# Patient Record
Sex: MALE | Race: WHITE | NOT HISPANIC OR LATINO | Employment: OTHER | URBAN - NONMETROPOLITAN AREA
[De-identification: names, ages, dates, MRNs, and addresses within clinical notes are randomized per-mention and may not be internally consistent; named-entity substitution may affect disease eponyms.]

---

## 2017-02-02 ENCOUNTER — HOSPITAL ENCOUNTER (OUTPATIENT)
Dept: RADIOLOGY | Facility: CLINIC | Age: 62
Discharge: HOME | End: 2017-02-02
Admitting: FAMILY MEDICINE
Payer: MEDICARE

## 2017-02-02 ENCOUNTER — OFFICE VISIT (OUTPATIENT)
Dept: URGENT CARE | Facility: CLINIC | Age: 62
End: 2017-02-02
Payer: MEDICARE

## 2017-02-02 DIAGNOSIS — R05.9 COUGH: ICD-10-CM

## 2017-02-02 PROCEDURE — G0463 HOSPITAL OUTPT CLINIC VISIT: HCPCS

## 2017-02-02 PROCEDURE — 99204 OFFICE O/P NEW MOD 45 MIN: CPT

## 2017-02-02 PROCEDURE — 71020 HB CHEST X-RAY 2VW FRONTAL&LATL: CPT

## 2018-04-06 LAB
ALBUMIN SERPL BCP-MCNC: 3.9 G/DL (ref 3.5–5.7)
ALP SERPL-CCNC: 27 IU/L (ref 55–165)
ALT SERPL W P-5'-P-CCNC: 54 IU/L (ref 10–35)
AMYLASE (HISTORICAL): 33 U/L (ref 29–103)
ANION GAP SERPL CALCULATED.3IONS-SCNC: 13.1 MM/L
APTT PPP: 29.6 SEC (ref 24.4–37.6)
AST SERPL W P-5'-P-CCNC: 54 U/L (ref 8–27)
BACTERIA UR QL AUTO: ABNORMAL
BASOPHILS # BLD AUTO: 0 X3/UL (ref 0–0.3)
BASOPHILS # BLD AUTO: 0.3 % (ref 0–2)
BILIRUB SERPL-MCNC: 0.3 MG/DL (ref 0.3–1)
BILIRUB UR QL STRIP: NEGATIVE
BUN SERPL-MCNC: 30 MG/DL (ref 7–25)
CALCIUM SERPL-MCNC: 8.8 MG/DL (ref 8.6–10.5)
CHLORIDE SERPL-SCNC: 101 MM/L (ref 98–107)
CLARITY UR: CLEAR
CO2 SERPL-SCNC: 26 MM/L (ref 21–31)
COLOR UR: YELLOW
CREAT SERPL-MCNC: 2.49 MG/DL (ref 0.7–1.3)
DEPRECATED RDW RBC AUTO: 14.4 % (ref 11.5–14.5)
EGFR (HISTORICAL): 26 GFR
EGFR AFRICAN AMERICAN (HISTORICAL): 32 GFR
EOSINOPHIL # BLD AUTO: 0 X3/UL (ref 0–0.5)
EOSINOPHIL NFR BLD AUTO: 0.4 % (ref 0–5)
GLUCOSE (HISTORICAL): 170 MG/DL (ref 65–99)
GLUCOSE UR STRIP-MCNC: NEGATIVE MG/DL
HCT VFR BLD AUTO: 39.9 % (ref 42–52)
HGB BLD-MCNC: 13.1 G/DL (ref 14–18)
HGB UR QL STRIP.AUTO: NEGATIVE
INR PPP: 1.2 (ref 0.9–1.5)
KETONES UR STRIP-MCNC: NEGATIVE MG/DL
LACTATE DEHYDROGENASE FLUID (HISTORICAL): 1.3 MM/L (ref 0.5–2)
LEUKOCYTE ESTERASE UR QL STRIP: NEGATIVE
LIPASE SERPL-CCNC: 25 U/L (ref 11–82)
LYMPHOCYTES # BLD AUTO: 0.1 X3/UL (ref 1.2–4.2)
LYMPHOCYTES NFR BLD AUTO: 4.8 % (ref 20.5–51.1)
LYMPHOCYTES NFR BLD AUTO: 7 % (ref 20.5–51.1)
MCH RBC QN AUTO: 26.9 PG (ref 26–34)
MCHC RBC AUTO-ENTMCNC: 32.8 G/DL (ref 31–36)
MCV RBC AUTO: 82 FL (ref 81–99)
MONOCYTES # BLD AUTO: 0.2 X3/UL (ref 0–1)
MONOCYTES (HISTORICAL): 9 % (ref 1.7–12)
MONOCYTES NFR BLD AUTO: 8.6 % (ref 1.7–12)
NEUTROPHILS # BLD AUTO: 2.4 X3/UL (ref 1.4–6.5)
NEUTROPHILS ABS COUNT (HISTORICAL): 84 % (ref 42.2–75.2)
NEUTS SEG NFR BLD AUTO: 85.9 % (ref 42.2–75.2)
NITRITE UR QL STRIP: NEGATIVE
NON-SQ EPI CELLS URNS QL MICRO: ABNORMAL /HPF
OSMOLALITY, SERUM (HISTORICAL): 282 MOSM (ref 262–291)
PH UR STRIP.AUTO: 5.5 [PH] (ref 4.5–8)
PLATELET # BLD AUTO: 139 X3/UL (ref 130–400)
PLATELET ESTIMATE (HISTORICAL): NORMAL
PMV BLD AUTO: 9.6 FL (ref 8.6–11.7)
POTASSIUM SERPL-SCNC: 4.1 MM/L (ref 3.5–5.5)
PROT UR STRIP-MCNC: ABNORMAL MG/DL
PROTHROMBIN TIME (HISTORICAL): 14 SEC (ref 10.1–12.9)
RBC # BLD AUTO: 4.87 X6/UL (ref 4.3–5.9)
RBC #/AREA URNS AUTO: ABNORMAL /HPF
RBC MORPHOLOGY (HISTORICAL): NORMAL
SODIUM SERPL-SCNC: 136 MM/L (ref 134–143)
SP GR UR STRIP.AUTO: >= 1.03 (ref 1–1.03)
TOTAL PROTEIN (HISTORICAL): 6.7 G/DL (ref 6.4–8.9)
UROBILINOGEN UR QL STRIP.AUTO: 0.2 EU/DL (ref 0.2–8)
WBC # BLD AUTO: 2.8 X3/UL (ref 4.8–10.8)
WBC #/AREA URNS AUTO: ABNORMAL /HPF

## 2018-04-07 LAB
ALBUMIN (HISTORICAL): 6.9 MG/DL
ALBUMIN CREATININE RATIO (HISTORICAL): 27.7 MG/G
ALBUMIN SERPL BCP-MCNC: 3.9 G/DL (ref 3.5–5.7)
ALP SERPL-CCNC: 24 IU/L (ref 55–165)
ALT SERPL W P-5'-P-CCNC: 49 IU/L (ref 10–35)
ANION GAP SERPL CALCULATED.3IONS-SCNC: 15.3 MM/L
AST SERPL W P-5'-P-CCNC: 57 U/L (ref 8–27)
BASOPHILS # BLD AUTO: 0 X3/UL (ref 0–0.3)
BASOPHILS # BLD AUTO: 0.3 % (ref 0–2)
BILIRUB SERPL-MCNC: 0.4 MG/DL (ref 0.3–1)
BUN SERPL-MCNC: 27 MG/DL (ref 7–25)
CALCIUM SERPL-MCNC: 8.5 MG/DL (ref 8.6–10.5)
CHLORIDE SERPL-SCNC: 101 MM/L (ref 98–107)
CO2 SERPL-SCNC: 27 MM/L (ref 21–31)
CREAT SERPL-MCNC: 2.33 MG/DL (ref 0.7–1.3)
CREATININE, RANDOM URINE (HISTORICAL): 249.1 MG/DL
DEPRECATED RDW RBC AUTO: 14.4 % (ref 11.5–14.5)
EGFR (HISTORICAL): 28 GFR
EGFR AFRICAN AMERICAN (HISTORICAL): 34 GFR
EOSINOPHIL # BLD AUTO: 0 X3/UL (ref 0–0.5)
EOSINOPHIL NFR BLD AUTO: 0.1 % (ref 0–5)
GLUCOSE (HISTORICAL): 124 MG/DL (ref 65–99)
HCT VFR BLD AUTO: 37.1 % (ref 42–52)
HGB BLD-MCNC: 12.5 G/DL (ref 14–18)
LYMPHOCYTES # BLD AUTO: 0.2 X3/UL (ref 1.2–4.2)
LYMPHOCYTES NFR BLD AUTO: 11.3 % (ref 20.5–51.1)
MCH RBC QN AUTO: 27.5 PG (ref 26–34)
MCHC RBC AUTO-ENTMCNC: 33.6 G/DL (ref 31–36)
MCV RBC AUTO: 82 FL (ref 81–99)
MONOCYTES # BLD AUTO: 0.2 X3/UL (ref 0–1)
MONOCYTES NFR BLD AUTO: 8.8 % (ref 1.7–12)
NEUTROPHILS # BLD AUTO: 1.6 X3/UL (ref 1.4–6.5)
NEUTS SEG NFR BLD AUTO: 79.5 % (ref 42.2–75.2)
OCCULT BLD, FECAL IMMUNOLOGICAL (HISTORICAL): NEGATIVE
OSMOLALITY, SERUM (HISTORICAL): 284 MOSM (ref 262–291)
PLATELET # BLD AUTO: 118 X3/UL (ref 130–400)
PMV BLD AUTO: 9.9 FL (ref 8.6–11.7)
POTASSIUM SERPL-SCNC: 4.3 MM/L (ref 3.5–5.5)
PROT UR-MCNC: 23 MG/DL
PROT/CREAT UR: 0.1 MG/MG
RBC # BLD AUTO: 4.52 X6/UL (ref 4.3–5.9)
SODIUM SERPL-SCNC: 139 MM/L (ref 134–143)
SODIUM URINE (HISTORICAL): 28 MM/L
STOOL WBC (HISTORICAL): NORMAL /HPF
TOTAL PROTEIN (HISTORICAL): 6.7 G/DL (ref 6.4–8.9)
WBC # BLD AUTO: 2 X3/UL (ref 4.8–10.8)

## 2018-04-08 LAB
ALBUMIN SERPL BCP-MCNC: 3.7 G/DL (ref 3.5–5.7)
ALP SERPL-CCNC: 23 IU/L (ref 55–165)
ALT SERPL W P-5'-P-CCNC: 53 IU/L (ref 10–35)
ANION GAP SERPL CALCULATED.3IONS-SCNC: 11.7 MM/L
AST SERPL W P-5'-P-CCNC: 71 U/L (ref 8–27)
BASOPHILS # BLD AUTO: 0 X3/UL (ref 0–0.3)
BASOPHILS # BLD AUTO: 0.3 % (ref 0–2)
BILIRUB SERPL-MCNC: 0.5 MG/DL (ref 0.3–1)
BUN SERPL-MCNC: 22 MG/DL (ref 7–25)
CALCIUM SERPL-MCNC: 8 MG/DL (ref 8.6–10.5)
CHLORIDE SERPL-SCNC: 100 MM/L (ref 98–107)
CLOSTRIDIUM DIFFICILE TOXIN (HISTORICAL): NEGATIVE
CO2 SERPL-SCNC: 28 MM/L (ref 21–31)
CREAT SERPL-MCNC: 1.86 MG/DL (ref 0.7–1.3)
DEPRECATED RDW RBC AUTO: 14.3 % (ref 11.5–14.5)
EGFR (HISTORICAL): 37 GFR
EGFR AFRICAN AMERICAN (HISTORICAL): 44 GFR
EOSINOPHIL # BLD AUTO: 0 X3/UL (ref 0–0.5)
EOSINOPHIL NFR BLD AUTO: 0.4 % (ref 0–5)
GLUCOSE (HISTORICAL): 122 MG/DL (ref 65–99)
HCT VFR BLD AUTO: 37.1 % (ref 42–52)
HGB BLD-MCNC: 12.5 G/DL (ref 14–18)
LYMPHOCYTES # BLD AUTO: 0.5 X3/UL (ref 1.2–4.2)
LYMPHOCYTES NFR BLD AUTO: 13.7 % (ref 20.5–51.1)
MAGNESIUM SERPL-MCNC: 2.2 MG/DL (ref 1.9–2.7)
MCH RBC QN AUTO: 27.7 PG (ref 26–34)
MCHC RBC AUTO-ENTMCNC: 33.7 G/DL (ref 31–36)
MCV RBC AUTO: 82.3 FL (ref 81–99)
MONOCYTES # BLD AUTO: 0.3 X3/UL (ref 0–1)
MONOCYTES NFR BLD AUTO: 8.1 % (ref 1.7–12)
NEUTROPHILS # BLD AUTO: 2.7 X3/UL (ref 1.4–6.5)
NEUTS SEG NFR BLD AUTO: 77.5 % (ref 42.2–75.2)
OSMOLALITY, SERUM (HISTORICAL): 277 MOSM (ref 262–291)
PHOSPHATE SERPL-MCNC: 2 MG/DL (ref 3–5.5)
PLATELET # BLD AUTO: 107 X3/UL (ref 130–400)
PMV BLD AUTO: 9.7 FL (ref 8.6–11.7)
POTASSIUM SERPL-SCNC: 3.7 MM/L (ref 3.5–5.5)
RBC # BLD AUTO: 4.5 X6/UL (ref 4.3–5.9)
SODIUM SERPL-SCNC: 136 MM/L (ref 134–143)
TOTAL PROTEIN (HISTORICAL): 6.5 G/DL (ref 6.4–8.9)
WBC # BLD AUTO: 3.5 X3/UL (ref 4.8–10.8)

## 2018-04-09 LAB
ANION GAP SERPL CALCULATED.3IONS-SCNC: 12.7 MM/L
BASOPHILS # BLD AUTO: 0 X3/UL (ref 0–0.3)
BASOPHILS # BLD AUTO: 0.2 % (ref 0–2)
BUN SERPL-MCNC: 16 MG/DL (ref 7–25)
CALCIUM SERPL-MCNC: 7.9 MG/DL (ref 8.6–10.5)
CHLORIDE SERPL-SCNC: 103 MM/L (ref 98–107)
CO2 SERPL-SCNC: 24 MM/L (ref 21–31)
CREAT SERPL-MCNC: 1.42 MG/DL (ref 0.7–1.3)
DEPRECATED RDW RBC AUTO: 14.3 % (ref 11.5–14.5)
EGFR (HISTORICAL): 50 GFR
EGFR AFRICAN AMERICAN (HISTORICAL): > 60 GFR
EOSINOPHIL # BLD AUTO: 0 X3/UL (ref 0–0.5)
EOSINOPHIL NFR BLD AUTO: 0 % (ref 0–5)
GLUCOSE (HISTORICAL): 145 MG/DL (ref 65–99)
HCT VFR BLD AUTO: 35.6 % (ref 42–52)
HGB BLD-MCNC: 11.7 G/DL (ref 14–18)
LYMPHOCYTES # BLD AUTO: 0.7 X3/UL (ref 1.2–4.2)
LYMPHOCYTES NFR BLD AUTO: 20.6 % (ref 20.5–51.1)
MAGNESIUM SERPL-MCNC: 2.2 MG/DL (ref 1.9–2.7)
MCH RBC QN AUTO: 27 PG (ref 26–34)
MCHC RBC AUTO-ENTMCNC: 32.8 G/DL (ref 31–36)
MCV RBC AUTO: 82.4 FL (ref 81–99)
MONOCYTES # BLD AUTO: 0.4 X3/UL (ref 0–1)
MONOCYTES NFR BLD AUTO: 11.1 % (ref 1.7–12)
NEUTROPHILS # BLD AUTO: 2.3 X3/UL (ref 1.4–6.5)
NEUTS SEG NFR BLD AUTO: 68.1 % (ref 42.2–75.2)
OSMOLALITY, SERUM (HISTORICAL): 276 MOSM (ref 262–291)
PHOSPHATE SERPL-MCNC: 2.1 MG/DL (ref 3–5.5)
PLATELET # BLD AUTO: 112 X3/UL (ref 130–400)
PMV BLD AUTO: 10 FL (ref 8.6–11.7)
POTASSIUM SERPL-SCNC: 3.7 MM/L (ref 3.5–5.5)
RBC # BLD AUTO: 4.32 X6/UL (ref 4.3–5.9)
SODIUM SERPL-SCNC: 136 MM/L (ref 134–143)
WBC # BLD AUTO: 3.3 X3/UL (ref 4.8–10.8)

## 2018-04-10 LAB
ANION GAP SERPL CALCULATED.3IONS-SCNC: 13 MM/L
BASOPHILS # BLD AUTO: 0 X3/UL (ref 0–0.3)
BASOPHILS # BLD AUTO: 0.3 % (ref 0–2)
BUN SERPL-MCNC: 12 MG/DL (ref 7–25)
CALCIUM SERPL-MCNC: 7.9 MG/DL (ref 8.6–10.5)
CHLORIDE SERPL-SCNC: 105 MM/L (ref 98–107)
CO2 SERPL-SCNC: 24 MM/L (ref 21–31)
CREAT SERPL-MCNC: 1.11 MG/DL (ref 0.7–1.3)
DEPRECATED RDW RBC AUTO: 14.3 % (ref 11.5–14.5)
EGFR (HISTORICAL): > 60 GFR
EGFR AFRICAN AMERICAN (HISTORICAL): > 60 GFR
EOSINOPHIL # BLD AUTO: 0 X3/UL (ref 0–0.5)
EOSINOPHIL NFR BLD AUTO: 0.4 % (ref 0–5)
GLUCOSE (HISTORICAL): 159 MG/DL (ref 65–99)
HCT VFR BLD AUTO: 34.4 % (ref 42–52)
HGB BLD-MCNC: 11.4 G/DL (ref 14–18)
LYMPHOCYTES # BLD AUTO: 0.7 X3/UL (ref 1.2–4.2)
LYMPHOCYTES NFR BLD AUTO: 20.9 % (ref 20.5–51.1)
MAGNESIUM SERPL-MCNC: 2.2 MG/DL (ref 1.9–2.7)
MCH RBC QN AUTO: 26.9 PG (ref 26–34)
MCHC RBC AUTO-ENTMCNC: 33 G/DL (ref 31–36)
MCV RBC AUTO: 81.5 FL (ref 81–99)
MONOCYTES # BLD AUTO: 0.3 X3/UL (ref 0–1)
MONOCYTES NFR BLD AUTO: 10.7 % (ref 1.7–12)
NEUTROPHILS # BLD AUTO: 2.2 X3/UL (ref 1.4–6.5)
NEUTS SEG NFR BLD AUTO: 67.7 % (ref 42.2–75.2)
OSMOLALITY UR: 755 MOSMO
OSMOLALITY, SERUM (HISTORICAL): 279 MOSM (ref 262–291)
PHOSPHATE SERPL-MCNC: 1.8 MG/DL (ref 3–5.5)
PLATELET # BLD AUTO: 120 X3/UL (ref 130–400)
PMV BLD AUTO: 9.4 FL (ref 8.6–11.7)
POTASSIUM SERPL-SCNC: 4 MM/L (ref 3.5–5.5)
RBC # BLD AUTO: 4.23 X6/UL (ref 4.3–5.9)
SODIUM SERPL-SCNC: 138 MM/L (ref 134–143)
TROPONIN I SERPL-MCNC: < 0.03 NG/ML
WBC # BLD AUTO: 3.2 X3/UL (ref 4.8–10.8)

## 2018-04-11 LAB
ALBUMIN SERPL BCP-MCNC: 3.6 G/DL (ref 3.5–5.7)
ALP SERPL-CCNC: 27 IU/L (ref 55–165)
ALT SERPL W P-5'-P-CCNC: 45 IU/L (ref 10–35)
ANION GAP SERPL CALCULATED.3IONS-SCNC: 15.9 MM/L
AST SERPL W P-5'-P-CCNC: 42 U/L (ref 8–27)
BASOPHILS # BLD AUTO: 0 X3/UL (ref 0–0.3)
BASOPHILS # BLD AUTO: 0.4 % (ref 0–2)
BILIRUB SERPL-MCNC: 0.7 MG/DL (ref 0.3–1)
BUN SERPL-MCNC: 11 MG/DL (ref 7–25)
CALCIUM SERPL-MCNC: 8.5 MG/DL (ref 8.6–10.5)
CHLORIDE SERPL-SCNC: 104 MM/L (ref 98–107)
CO2 SERPL-SCNC: 23 MM/L (ref 21–31)
CREAT SERPL-MCNC: 1.02 MG/DL (ref 0.7–1.3)
DEPRECATED RDW RBC AUTO: 14.3 % (ref 11.5–14.5)
EGFR (HISTORICAL): > 60 GFR
EGFR AFRICAN AMERICAN (HISTORICAL): > 60 GFR
EOSINOPHIL # BLD AUTO: 0.1 X3/UL (ref 0–0.5)
EOSINOPHIL NFR BLD AUTO: 1.8 % (ref 0–5)
GLUCOSE (HISTORICAL): 109 MG/DL (ref 65–99)
HCT VFR BLD AUTO: 35.9 % (ref 42–52)
HGB BLD-MCNC: 12.1 G/DL (ref 14–18)
LYMPHOCYTES # BLD AUTO: 1.2 X3/UL (ref 1.2–4.2)
LYMPHOCYTES NFR BLD AUTO: 32.9 % (ref 20.5–51.1)
MAGNESIUM SERPL-MCNC: 2.2 MG/DL (ref 1.9–2.7)
MCH RBC QN AUTO: 27.2 PG (ref 26–34)
MCHC RBC AUTO-ENTMCNC: 33.7 G/DL (ref 31–36)
MCV RBC AUTO: 80.6 FL (ref 81–99)
MONOCYTES # BLD AUTO: 0.4 X3/UL (ref 0–1)
MONOCYTES NFR BLD AUTO: 10.9 % (ref 1.7–12)
NEUTROPHILS # BLD AUTO: 1.9 X3/UL (ref 1.4–6.5)
NEUTS SEG NFR BLD AUTO: 54 % (ref 42.2–75.2)
OSMOLALITY, SERUM (HISTORICAL): 278 MOSM (ref 262–291)
PHOSPHATE SERPL-MCNC: 2.5 MG/DL (ref 3–5.5)
PLATELET # BLD AUTO: 169 X3/UL (ref 130–400)
PMV BLD AUTO: 9.6 FL (ref 8.6–11.7)
POTASSIUM SERPL-SCNC: 3.9 MM/L (ref 3.5–5.5)
RBC # BLD AUTO: 4.45 X6/UL (ref 4.3–5.9)
SODIUM SERPL-SCNC: 139 MM/L (ref 134–143)
TOTAL PROTEIN (HISTORICAL): 6.6 G/DL (ref 6.4–8.9)
WBC # BLD AUTO: 3.6 X3/UL (ref 4.8–10.8)

## 2018-04-12 LAB
GIARDIA ANTIGEN STOOL (HISTORICAL): NEGATIVE
RESULT 1 (HISTORICAL): NORMAL
STOOL COMPLETE OVA AND PARASITES (HISTORICAL): NORMAL

## 2018-05-15 LAB
ALT SERPL W P-5'-P-CCNC: 46 IU/L (ref 10–35)
AST SERPL W P-5'-P-CCNC: 41 U/L (ref 8–27)
CHOLEST SERPL-MCNC: 140 MG/DL (ref 0–200)
CREAT SERPL-MCNC: 1.55 MG/DL (ref 0.7–1.3)
HDLC SERPL-MCNC: 38 MG/DL (ref 40–60)
LDLC SERPL CALC-MCNC: 81.8 MG/DL (ref 75–193)
POTASSIUM SERPL-SCNC: 3.8 MM/L (ref 3.5–5.5)
TRIGL SERPL-MCNC: 99 MG/DL (ref 44–166)
VLDL CHOLESTEROL (HISTORICAL): 20 MG/DL (ref 5–51)

## 2018-06-19 LAB
ANION GAP SERPL CALCULATED.3IONS-SCNC: 10.3 MM/L
BUN SERPL-MCNC: 18 MG/DL (ref 7–25)
CALCIUM SERPL-MCNC: 9.4 MG/DL (ref 8.6–10.5)
CHLORIDE SERPL-SCNC: 104 MM/L (ref 98–107)
CO2 SERPL-SCNC: 30 MM/L (ref 21–31)
CREAT SERPL-MCNC: 1.31 MG/DL (ref 0.7–1.3)
EGFR (HISTORICAL): 55 GFR
EGFR AFRICAN AMERICAN (HISTORICAL): > 60 GFR
GLUCOSE (HISTORICAL): 210 MG/DL (ref 65–99)
OSMOLALITY, SERUM (HISTORICAL): 287 MOSM (ref 262–291)
POTASSIUM SERPL-SCNC: 4.3 MM/L (ref 3.5–5.5)
PSA (HISTORICAL): 0.57 NG/ML (ref 0–4)
SODIUM SERPL-SCNC: 140 MM/L (ref 134–143)

## 2018-07-23 ENCOUNTER — OFFICE VISIT (OUTPATIENT)
Dept: URGENT CARE | Facility: CLINIC | Age: 63
End: 2018-07-23
Payer: MEDICARE

## 2018-07-23 VITALS
HEIGHT: 70 IN | TEMPERATURE: 97.9 F | SYSTOLIC BLOOD PRESSURE: 144 MMHG | OXYGEN SATURATION: 95 % | WEIGHT: 265 LBS | BODY MASS INDEX: 37.94 KG/M2 | HEART RATE: 77 BPM | DIASTOLIC BLOOD PRESSURE: 84 MMHG

## 2018-07-23 DIAGNOSIS — L02.212 ABSCESS OF BACK: Primary | ICD-10-CM

## 2018-07-23 PROCEDURE — G0463 HOSPITAL OUTPT CLINIC VISIT: HCPCS | Performed by: PHYSICIAN ASSISTANT

## 2018-07-23 PROCEDURE — 99213 OFFICE O/P EST LOW 20 MIN: CPT | Performed by: PHYSICIAN ASSISTANT

## 2018-07-23 RX ORDER — ATORVASTATIN CALCIUM 80 MG/1
TABLET, FILM COATED ORAL
COMMUNITY
Start: 2018-05-27 | End: 2020-04-30 | Stop reason: SDUPTHER

## 2018-07-23 RX ORDER — POTASSIUM CITRATE 10 MEQ/1
TABLET, EXTENDED RELEASE ORAL
COMMUNITY
Start: 2018-06-16 | End: 2019-11-14

## 2018-07-23 RX ORDER — CEPHALEXIN 500 MG/1
500 CAPSULE ORAL EVERY 8 HOURS SCHEDULED
Qty: 30 CAPSULE | Refills: 0 | Status: SHIPPED | COMMUNITY
Start: 2018-07-23 | End: 2018-08-02

## 2018-07-23 RX ORDER — ASPIRIN 81 MG/1
81 TABLET, CHEWABLE ORAL
COMMUNITY

## 2018-07-23 RX ORDER — AZITHROMYCIN 250 MG/1
TABLET, FILM COATED ORAL DAILY
COMMUNITY
Start: 2017-02-02 | End: 2020-04-06 | Stop reason: ALTCHOICE

## 2018-07-23 RX ORDER — NITROGLYCERIN 0.4 MG/1
TABLET SUBLINGUAL
COMMUNITY
Start: 2018-05-22

## 2018-07-23 RX ORDER — ATORVASTATIN CALCIUM 80 MG/1
TABLET, FILM COATED ORAL
COMMUNITY

## 2018-07-23 RX ORDER — ALBUTEROL SULFATE 90 UG/1
2 AEROSOL, METERED RESPIRATORY (INHALATION) EVERY 4 HOURS PRN
COMMUNITY
Start: 2017-02-02

## 2018-07-23 RX ORDER — PIOGLITAZONEHYDROCHLORIDE 30 MG/1
30 TABLET ORAL
COMMUNITY
End: 2019-11-14

## 2018-07-23 RX ORDER — LISINOPRIL 20 MG/1
TABLET ORAL
COMMUNITY
Start: 2018-05-27 | End: 2019-11-14

## 2018-07-23 RX ORDER — TRAZODONE HYDROCHLORIDE 100 MG/1
TABLET ORAL
COMMUNITY
Start: 2018-07-20

## 2018-07-23 RX ORDER — NATEGLINIDE 120 MG/1
120 TABLET ORAL
COMMUNITY
End: 2019-09-14 | Stop reason: SDUPTHER

## 2018-07-23 RX ORDER — PRASUGREL 10 MG/1
TABLET, FILM COATED ORAL
COMMUNITY
Start: 2018-06-08 | End: 2019-11-14

## 2018-07-23 RX ORDER — AMLODIPINE BESYLATE 10 MG/1
TABLET ORAL
COMMUNITY
Start: 2018-06-25

## 2018-07-23 RX ORDER — POTASSIUM CHLORIDE 1500 MG/1
TABLET, FILM COATED, EXTENDED RELEASE ORAL
COMMUNITY
Start: 2018-05-27 | End: 2020-04-06 | Stop reason: ALTCHOICE

## 2018-07-23 RX ORDER — METOPROLOL SUCCINATE 50 MG/1
50 TABLET, EXTENDED RELEASE ORAL
COMMUNITY
End: 2019-11-14

## 2018-07-23 RX ORDER — HYDROCHLOROTHIAZIDE 25 MG/1
TABLET ORAL
COMMUNITY
Start: 2018-05-27

## 2018-07-23 NOTE — PROGRESS NOTES
[address]  (office) [phone]  (fax) [phone]        NAME: Ulyses Boas is a 70 y o  male  : 1955    MRN: [id number]  DATE: 2018  TIME: 3:08 PM    Assessment and Plan   Abscess of back [L02 212]  1  Abscess of back  cephalexin (KEFLEX) 500 mg capsule       Patient Instructions   Abscess was draining and I was able to express purulent discharge without an incision  Patient left office stating that his pain was significantly improved  Patient educated to use warm compresses to allow continued discharge of any remaining fluid  Patient has an apt next week with PCP which he should keep for a wound check  Patient did verbalize understanding  Patient prescribed Keflex and was instructed to take tid for 7 days  I have prescribed an antibiotic for the infection  Please take the antibiotic as prescribed and finish the entire prescription  I recommend that the patient takes an over the counter probiotic or eats yogurt with live cultures in it [place]) to keep good bacteria in the gut and help prevent diarrhea  To present to the ER if symptoms worsen  Chief Complaint     Chief Complaint   Patient presents with    Abscess     RIGHT SHOULDER BLADE         History of Present Illness   Reinier Phillips presents to the clinic c/o    Patient reports he is here due a painful lump on his right upper back  He reports this has been noticeably painful for the last few days  He reports a hx of several abscess/cysts in the past  He denies any discharge from this lump  Patient feels healthy otherwise  No fevers or chills  No other skin complaints  Review of Systems   Review of Systems   Constitutional: Negative for activity change, appetite change, chills, diaphoresis, fatigue and fever  HENT: Negative for congestion, ear discharge, ear pain, facial swelling, rhinorrhea, sinus pain, sinus pressure, sneezing and sore throat      Eyes: Negative for photophobia, pain, discharge, redness, itching and visual disturbance  Respiratory: Negative for apnea, cough, chest tightness, shortness of breath and wheezing  Cardiovascular: Negative for chest pain  Gastrointestinal: Negative for abdominal distention, abdominal pain, anal bleeding, blood in stool, constipation, diarrhea, nausea and vomiting  Genitourinary: Negative for dysuria, flank pain, frequency, hematuria and urgency  Musculoskeletal: Negative for arthralgias, back pain, gait problem, joint swelling, myalgias, neck pain and neck stiffness  Skin: Negative for color change, rash and wound  abscess on right back   Allergic/Immunologic: Negative for immunocompromised state  Neurological: Negative for dizziness, facial asymmetry and headaches  Hematological: Negative for adenopathy  Psychiatric/Behavioral: Negative for confusion and decreased concentration           Current Medications     Long-Term Prescriptions   Medication Sig Dispense Refill    amLODIPine (NORVASC) 10 mg tablet       aspirin 81 mg chewable tablet Chew 81 mg      atorvastatin (LIPITOR) 80 mg tablet       atorvastatin (LIPITOR) 80 mg tablet Take by mouth      hydrochlorothiazide (HYDRODIURIL) 25 mg tablet       lisinopril (ZESTRIL) 20 mg tablet       metFORMIN (GLUCOPHAGE) 1000 MG tablet Take 1,000 mg by mouth      metoprolol succinate (TOPROL-XL) 50 mg 24 hr tablet Take 50 mg by mouth      nateglinide (STARLIX) 120 mg tablet Take 120 mg by mouth      nitroglycerin (NITROSTAT) 0 4 mg SL tablet       pioglitazone (ACTOS) 30 mg tablet Take 30 mg by mouth      Potassium Chloride ER 20 MEQ TBCR       potassium citrate (UROCIT-K) 10 mEq       prasugrel (EFFIENT) tablet       traZODone (DESYREL) 100 mg tablet          Current Allergies     Allergies as of 07/23/2018 - Reviewed 07/23/2018   Allergen Reaction Noted    Tetracycline  02/02/2017            The following portions of the patient's history were reviewed and updated as appropriate: allergies, current medications, past family history, past medical history, past social history, past surgical history and problem list   Past Medical History:   Diagnosis Date    Diabetes mellitus ([address] )     Hypertension      Past Surgical History:   Procedure Laterality Date    CARDIAC SURGERY      FOOT SURGERY      PARTIAL HIP ARTHROPLASTY       Social History     Social History    Marital status: /Civil Union     Spouse name: N/A    Number of children: N/A    Years of education: N/A     Occupational History    Not on file  Social History Main Topics    Smoking status: Never Smoker    Smokeless tobacco: Never Used    Alcohol use No    Drug use: No    Sexual activity: Not on file     Other Topics Concern    Not on file     Social History Narrative    No narrative on file       Objective   /84 (BP Location: Left arm, Patient Position: Sitting, Cuff Size: Large)   Pulse 77   Temp 97 9 °F (36 6 °C) (Tympanic)   Ht 5' 10" (1 778 m)   Wt 120 kg (265 lb)   SpO2 95%   BMI 38 02 kg/m²      Physical Exam     Physical Exam   Constitutional: He is oriented to person, place, and time  He appears well-developed and well-nourished  No distress  HENT:   Head: Normocephalic and atraumatic  Right Ear: Tympanic membrane and external ear normal    Left Ear: Tympanic membrane and external ear normal    Nose: Nose normal    Mouth/Throat: Oropharynx is clear and moist  No oropharyngeal exudate  Eyes: Conjunctivae and EOM are normal  Pupils are equal, round, and reactive to light  Right eye exhibits no discharge  Left eye exhibits no discharge  No scleral icterus  Neck: Normal range of motion  Neck supple  No JVD present  No tracheal deviation present  No thyromegaly present  Cardiovascular: Normal rate, regular rhythm and normal heart sounds  Exam reveals no gallop and no friction rub  No murmur heard    Pulmonary/Chest: Effort normal and breath sounds normal  No stridor  No respiratory distress  He has no wheezes  He has no rales  He exhibits no tenderness  Abdominal: Soft  Bowel sounds are normal  He exhibits no distension and no mass  There is no tenderness  There is no rebound and no guarding  Musculoskeletal: Normal range of motion  He exhibits no tenderness or deformity  Lymphadenopathy:     He has no cervical adenopathy  Neurological: He is alert and oriented to person, place, and time  He has normal reflexes  Coordination normal    Skin: Skin is warm and dry  No rash noted  He is not diaphoretic  There is erythema  No pallor  Psychiatric: He has a normal mood and affect  His behavior is normal  Judgment and thought content normal    Nursing note and vitals reviewed        Trace Grimes PA-C

## 2018-07-31 ENCOUNTER — APPOINTMENT (OUTPATIENT)
Dept: LAB | Facility: HOSPITAL | Age: 63
End: 2018-07-31
Attending: INTERNAL MEDICINE
Payer: MEDICARE

## 2018-07-31 ENCOUNTER — TRANSCRIBE ORDERS (OUTPATIENT)
Dept: ADMINISTRATIVE | Facility: HOSPITAL | Age: 63
End: 2018-07-31

## 2018-07-31 DIAGNOSIS — E08.21 DIABETIC NEPHROPATHY ASSOCIATED WITH DIABETES MELLITUS DUE TO UNDERLYING CONDITION (HCC): ICD-10-CM

## 2018-07-31 DIAGNOSIS — E78.5 HYPERLIPIDEMIA, UNSPECIFIED HYPERLIPIDEMIA TYPE: ICD-10-CM

## 2018-07-31 DIAGNOSIS — Z12.5 SCREENING FOR PROSTATE CANCER: ICD-10-CM

## 2018-07-31 DIAGNOSIS — N18.30 CHRONIC KIDNEY DISEASE, STAGE III (MODERATE) (HCC): ICD-10-CM

## 2018-07-31 DIAGNOSIS — N18.30 CHRONIC KIDNEY DISEASE, STAGE III (MODERATE) (HCC): Primary | ICD-10-CM

## 2018-07-31 LAB
ALBUMIN SERPL BCP-MCNC: 4.3 G/DL (ref 3.5–5.7)
ALP SERPL-CCNC: 34 U/L (ref 55–165)
ALT SERPL W P-5'-P-CCNC: 28 U/L (ref 7–52)
ANION GAP SERPL CALCULATED.3IONS-SCNC: 8 MMOL/L (ref 4–13)
AST SERPL W P-5'-P-CCNC: 25 U/L (ref 13–39)
BACTERIA UR QL AUTO: ABNORMAL /HPF
BILIRUB SERPL-MCNC: 0.4 MG/DL (ref 0.2–1)
BILIRUB UR QL STRIP: NEGATIVE
BUN SERPL-MCNC: 20 MG/DL (ref 7–25)
CALCIUM SERPL-MCNC: 9.9 MG/DL (ref 8.6–10.5)
CHLORIDE SERPL-SCNC: 103 MMOL/L (ref 98–107)
CHOLEST SERPL-MCNC: 151 MG/DL (ref 0–200)
CLARITY UR: CLEAR
CO2 SERPL-SCNC: 29 MMOL/L (ref 21–31)
COLOR UR: YELLOW
CREAT SERPL-MCNC: 1.47 MG/DL (ref 0.7–1.3)
CREAT UR-MCNC: 136 MG/DL
ERYTHROCYTE [DISTWIDTH] IN BLOOD BY AUTOMATED COUNT: 14.7 % (ref 11.6–15.1)
EST. AVERAGE GLUCOSE BLD GHB EST-MCNC: 209 MG/DL
GFR SERPL CREATININE-BSD FRML MDRD: 49 ML/MIN/1.73SQ M
GLUCOSE P FAST SERPL-MCNC: 218 MG/DL (ref 65–99)
GLUCOSE UR STRIP-MCNC: ABNORMAL MG/DL
HBA1C MFR BLD: 8.9 % (ref 4.2–6.3)
HCT VFR BLD AUTO: 45.1 % (ref 42–52)
HDLC SERPL-MCNC: 47 MG/DL (ref 40–60)
HGB BLD-MCNC: 14.6 G/DL (ref 12–17)
HGB UR QL STRIP.AUTO: NEGATIVE
KETONES UR STRIP-MCNC: NEGATIVE MG/DL
LDLC SERPL CALC-MCNC: 87 MG/DL (ref 0–100)
LEUKOCYTE ESTERASE UR QL STRIP: NEGATIVE
MCH RBC QN AUTO: 26.8 PG (ref 26.8–34.3)
MCHC RBC AUTO-ENTMCNC: 32.4 G/DL (ref 31.4–37.4)
MCV RBC AUTO: 83 FL (ref 82–98)
MICROALBUMIN UR-MCNC: 135 MG/L (ref 0–20)
MICROALBUMIN/CREAT 24H UR: 99 MG/G CREATININE (ref 0–30)
MUCOUS THREADS UR QL AUTO: ABNORMAL
NITRITE UR QL STRIP: NEGATIVE
NON-SQ EPI CELLS URNS QL MICRO: ABNORMAL /HPF
NONHDLC SERPL-MCNC: 104 MG/DL
OTHER STN SPEC: ABNORMAL
PH UR STRIP.AUTO: 6 [PH] (ref 5–8)
PLATELET # BLD AUTO: 217 THOUSANDS/UL (ref 149–390)
PMV BLD AUTO: 9.7 FL (ref 8.9–12.7)
POTASSIUM SERPL-SCNC: 4.5 MMOL/L (ref 3.5–5.5)
PROT SERPL-MCNC: 7.4 G/DL (ref 6.4–8.9)
PROT UR STRIP-MCNC: ABNORMAL MG/DL
RBC # BLD AUTO: 5.46 MILLION/UL (ref 3.88–5.62)
RBC #/AREA URNS AUTO: ABNORMAL /HPF
SODIUM SERPL-SCNC: 140 MMOL/L (ref 134–143)
SP GR UR STRIP.AUTO: 1.02 (ref 1–1.03)
TRIGL SERPL-MCNC: 85 MG/DL (ref 44–166)
UROBILINOGEN UR QL STRIP.AUTO: 0.2 E.U./DL
WBC # BLD AUTO: 7.5 THOUSAND/UL (ref 4.31–10.16)
WBC #/AREA URNS AUTO: ABNORMAL /HPF

## 2018-07-31 PROCEDURE — 82570 ASSAY OF URINE CREATININE: CPT | Performed by: INTERNAL MEDICINE

## 2018-07-31 PROCEDURE — 80053 COMPREHEN METABOLIC PANEL: CPT

## 2018-07-31 PROCEDURE — G0103 PSA SCREENING: HCPCS

## 2018-07-31 PROCEDURE — 36415 COLL VENOUS BLD VENIPUNCTURE: CPT

## 2018-07-31 PROCEDURE — 82043 UR ALBUMIN QUANTITATIVE: CPT | Performed by: INTERNAL MEDICINE

## 2018-07-31 PROCEDURE — 80061 LIPID PANEL: CPT

## 2018-07-31 PROCEDURE — 81001 URINALYSIS AUTO W/SCOPE: CPT | Performed by: INTERNAL MEDICINE

## 2018-07-31 PROCEDURE — 83036 HEMOGLOBIN GLYCOSYLATED A1C: CPT

## 2018-07-31 PROCEDURE — 85027 COMPLETE CBC AUTOMATED: CPT

## 2018-08-01 LAB
PSA FREE MFR SERPL: NORMAL %
PSA FREE SERPL-MCNC: <0.01 NG/ML
PSA SERPL-MCNC: <0.1 NG/ML (ref 0–4)

## 2019-04-15 ENCOUNTER — TRANSCRIBE ORDERS (OUTPATIENT)
Dept: ADMINISTRATIVE | Facility: HOSPITAL | Age: 64
End: 2019-04-15

## 2019-04-15 DIAGNOSIS — E11.21 DIABETIC GLOMERULOPATHY (HCC): Primary | ICD-10-CM

## 2019-04-18 ENCOUNTER — HOSPITAL ENCOUNTER (OUTPATIENT)
Dept: CT IMAGING | Facility: HOSPITAL | Age: 64
Discharge: HOME | End: 2019-04-18
Attending: INTERNAL MEDICINE
Payer: MEDICARE

## 2019-04-18 DIAGNOSIS — E11.21 DIABETIC GLOMERULOPATHY (HCC): ICD-10-CM

## 2019-04-18 PROCEDURE — 74176 CT ABD & PELVIS W/O CONTRAST: CPT

## 2019-04-30 ENCOUNTER — TRANSCRIBE ORDERS (OUTPATIENT)
Dept: ADMINISTRATIVE | Facility: HOSPITAL | Age: 64
End: 2019-04-30

## 2019-04-30 DIAGNOSIS — N28.1 CYST OF LEFT KIDNEY: Primary | ICD-10-CM

## 2019-05-02 ENCOUNTER — OFFICE VISIT (OUTPATIENT)
Dept: URGENT CARE | Facility: CLINIC | Age: 64
End: 2019-05-02
Payer: MEDICARE

## 2019-05-02 VITALS
HEART RATE: 67 BPM | WEIGHT: 265 LBS | RESPIRATION RATE: 18 BRPM | BODY MASS INDEX: 37.94 KG/M2 | SYSTOLIC BLOOD PRESSURE: 144 MMHG | OXYGEN SATURATION: 94 % | TEMPERATURE: 98.6 F | DIASTOLIC BLOOD PRESSURE: 70 MMHG | HEIGHT: 70 IN

## 2019-05-02 DIAGNOSIS — J30.9 ALLERGIC RHINITIS, UNSPECIFIED SEASONALITY, UNSPECIFIED TRIGGER: Primary | ICD-10-CM

## 2019-05-02 PROCEDURE — G0463 HOSPITAL OUTPT CLINIC VISIT: HCPCS | Performed by: PHYSICIAN ASSISTANT

## 2019-05-02 PROCEDURE — 99213 OFFICE O/P EST LOW 20 MIN: CPT | Performed by: PHYSICIAN ASSISTANT

## 2019-05-02 RX ORDER — CETIRIZINE HYDROCHLORIDE 10 MG/1
10 TABLET ORAL DAILY
Qty: 14 TABLET | Refills: 0 | Status: SHIPPED | COMMUNITY
Start: 2019-05-02 | End: 2021-01-08

## 2019-05-06 ENCOUNTER — LAB (OUTPATIENT)
Dept: LAB | Facility: HOSPITAL | Age: 64
End: 2019-05-06
Attending: INTERNAL MEDICINE
Payer: MEDICARE

## 2019-05-06 ENCOUNTER — TRANSCRIBE ORDERS (OUTPATIENT)
Dept: ADMINISTRATIVE | Facility: HOSPITAL | Age: 64
End: 2019-05-06

## 2019-05-06 DIAGNOSIS — N18.30 CHRONIC KIDNEY DISEASE, STAGE III (MODERATE) (HCC): Primary | ICD-10-CM

## 2019-05-06 DIAGNOSIS — N18.30 CHRONIC KIDNEY DISEASE, STAGE III (MODERATE) (HCC): ICD-10-CM

## 2019-05-06 LAB
ALBUMIN SERPL BCP-MCNC: 4.3 G/DL (ref 3.5–5.7)
ALP SERPL-CCNC: 28 U/L (ref 55–165)
ALT SERPL W P-5'-P-CCNC: 51 U/L (ref 7–52)
ANION GAP SERPL CALCULATED.3IONS-SCNC: 7 MMOL/L (ref 4–13)
AST SERPL W P-5'-P-CCNC: 45 U/L (ref 13–39)
BACTERIA UR QL AUTO: ABNORMAL /HPF
BASOPHILS # BLD AUTO: 0 THOUSANDS/ΜL (ref 0–0.1)
BASOPHILS NFR BLD AUTO: 1 % (ref 0–2)
BILIRUB SERPL-MCNC: 0.4 MG/DL (ref 0.2–1)
BILIRUB UR QL STRIP: NEGATIVE
BUN SERPL-MCNC: 22 MG/DL (ref 7–25)
CALCIUM SERPL-MCNC: 9.7 MG/DL (ref 8.6–10.5)
CHLORIDE SERPL-SCNC: 102 MMOL/L (ref 98–107)
CHOLEST SERPL-MCNC: 131 MG/DL (ref 0–200)
CLARITY UR: CLEAR
CO2 SERPL-SCNC: 32 MMOL/L (ref 21–31)
COLOR UR: YELLOW
CREAT SERPL-MCNC: 1.49 MG/DL (ref 0.7–1.3)
CREAT UR-MCNC: 171 MG/DL
CREAT UR-MCNC: 171 MG/DL
EOSINOPHIL # BLD AUTO: 0.2 THOUSAND/ΜL (ref 0–0.61)
EOSINOPHIL NFR BLD AUTO: 4 % (ref 0–5)
ERYTHROCYTE [DISTWIDTH] IN BLOOD BY AUTOMATED COUNT: 14.6 % (ref 11.5–14.5)
GFR SERPL CREATININE-BSD FRML MDRD: 48 ML/MIN/1.73SQ M
GLUCOSE P FAST SERPL-MCNC: 165 MG/DL (ref 65–99)
GLUCOSE UR STRIP-MCNC: ABNORMAL MG/DL
HCT VFR BLD AUTO: 44.2 % (ref 42–47)
HDLC SERPL-MCNC: 40 MG/DL (ref 40–60)
HGB BLD-MCNC: 14.7 G/DL (ref 14–18)
HGB UR QL STRIP.AUTO: NEGATIVE
KETONES UR STRIP-MCNC: NEGATIVE MG/DL
LDLC SERPL CALC-MCNC: 73 MG/DL (ref 0–100)
LEUKOCYTE ESTERASE UR QL STRIP: NEGATIVE
LYMPHOCYTES # BLD AUTO: 1.5 THOUSANDS/ΜL (ref 0.6–4.47)
LYMPHOCYTES NFR BLD AUTO: 27 % (ref 21–51)
MCH RBC QN AUTO: 27.8 PG (ref 26–34)
MCHC RBC AUTO-ENTMCNC: 33.3 G/DL (ref 31–37)
MCV RBC AUTO: 84 FL (ref 81–99)
MICROALBUMIN UR-MCNC: 79.7 MG/L (ref 0–20)
MICROALBUMIN/CREAT 24H UR: 47 MG/G CREATININE (ref 0–30)
MONOCYTES # BLD AUTO: 0.5 THOUSAND/ΜL (ref 0.17–1.22)
MONOCYTES NFR BLD AUTO: 9 % (ref 2–12)
MUCOUS THREADS UR QL AUTO: ABNORMAL
NEUTROPHILS # BLD AUTO: 3.4 THOUSANDS/ΜL (ref 1.4–6.5)
NEUTS SEG NFR BLD AUTO: 60 % (ref 42–75)
NITRITE UR QL STRIP: NEGATIVE
NON-SQ EPI CELLS URNS QL MICRO: ABNORMAL /HPF
NONHDLC SERPL-MCNC: 91 MG/DL
PH UR STRIP.AUTO: 7 [PH]
PLATELET # BLD AUTO: 231 THOUSANDS/UL (ref 149–390)
PMV BLD AUTO: 9.8 FL (ref 8.6–11.7)
POTASSIUM SERPL-SCNC: 4.1 MMOL/L (ref 3.5–5.5)
PROT SERPL-MCNC: 8.1 G/DL (ref 6.4–8.9)
PROT UR STRIP-MCNC: NEGATIVE MG/DL
PROT UR-MCNC: 20 MG/DL
PROT/CREAT UR: 0.12 MG/G{CREAT} (ref 0–0.1)
RBC # BLD AUTO: 5.28 MILLION/UL (ref 4.3–5.9)
RBC #/AREA URNS AUTO: ABNORMAL /HPF
SODIUM SERPL-SCNC: 141 MMOL/L (ref 134–143)
SP GR UR STRIP.AUTO: 1.02 (ref 1–1.03)
T4 SERPL-MCNC: 10.7 UG/DL (ref 4.7–13.3)
TRIGL SERPL-MCNC: 88 MG/DL (ref 44–166)
TSH SERPL DL<=0.05 MIU/L-ACNC: 2.74 UIU/ML (ref 0.45–5.33)
UROBILINOGEN UR QL STRIP.AUTO: 0.2 E.U./DL
WBC # BLD AUTO: 5.7 THOUSAND/UL (ref 4.8–10.8)
WBC #/AREA URNS AUTO: ABNORMAL /HPF

## 2019-05-06 PROCEDURE — 82043 UR ALBUMIN QUANTITATIVE: CPT | Performed by: INTERNAL MEDICINE

## 2019-05-06 PROCEDURE — 82570 ASSAY OF URINE CREATININE: CPT | Performed by: INTERNAL MEDICINE

## 2019-05-06 PROCEDURE — 84436 ASSAY OF TOTAL THYROXINE: CPT

## 2019-05-06 PROCEDURE — 80053 COMPREHEN METABOLIC PANEL: CPT

## 2019-05-06 PROCEDURE — 85025 COMPLETE CBC W/AUTO DIFF WBC: CPT

## 2019-05-06 PROCEDURE — 80061 LIPID PANEL: CPT

## 2019-05-06 PROCEDURE — 36415 COLL VENOUS BLD VENIPUNCTURE: CPT

## 2019-05-06 PROCEDURE — 84443 ASSAY THYROID STIM HORMONE: CPT

## 2019-05-06 PROCEDURE — 81001 URINALYSIS AUTO W/SCOPE: CPT | Performed by: INTERNAL MEDICINE

## 2019-05-06 PROCEDURE — 84156 ASSAY OF PROTEIN URINE: CPT | Performed by: INTERNAL MEDICINE

## 2019-05-08 ENCOUNTER — HOSPITAL ENCOUNTER (OUTPATIENT)
Dept: MRI IMAGING | Facility: HOSPITAL | Age: 64
Discharge: HOME | End: 2019-05-08
Attending: INTERNAL MEDICINE
Payer: MEDICARE

## 2019-05-08 DIAGNOSIS — N28.1 CYST OF LEFT KIDNEY: ICD-10-CM

## 2019-05-08 PROCEDURE — 74183 MRI ABD W/O CNTR FLWD CNTR: CPT

## 2019-05-08 PROCEDURE — A9585 GADOBUTROL INJECTION: HCPCS | Performed by: INTERNAL MEDICINE

## 2019-05-08 RX ADMIN — GADOBUTROL 13 ML: 604.72 INJECTION INTRAVENOUS at 16:12

## 2019-06-16 ENCOUNTER — HOSPITAL ENCOUNTER (EMERGENCY)
Facility: HOSPITAL | Age: 64
Discharge: ANOTHER HOSPITAL | End: 2019-06-16
Attending: EMERGENCY MEDICINE | Admitting: EMERGENCY MEDICINE
Payer: MEDICARE

## 2019-06-16 VITALS
BODY MASS INDEX: 37.87 KG/M2 | HEIGHT: 70 IN | HEART RATE: 60 BPM | RESPIRATION RATE: 20 BRPM | DIASTOLIC BLOOD PRESSURE: 88 MMHG | SYSTOLIC BLOOD PRESSURE: 187 MMHG | OXYGEN SATURATION: 96 % | WEIGHT: 264.55 LBS

## 2019-06-16 DIAGNOSIS — R11.2 NAUSEA & VOMITING: Primary | ICD-10-CM

## 2019-06-16 DIAGNOSIS — N18.9 CHRONIC KIDNEY DISEASE: ICD-10-CM

## 2019-06-16 DIAGNOSIS — N17.9 ACUTE KIDNEY INJURY (HCC): ICD-10-CM

## 2019-06-16 DIAGNOSIS — E86.0 DEHYDRATION: ICD-10-CM

## 2019-06-16 DIAGNOSIS — R19.7 DIARRHEA: ICD-10-CM

## 2019-06-16 LAB
ALBUMIN SERPL BCP-MCNC: 3.6 G/DL (ref 3.5–5.7)
ALP SERPL-CCNC: 29 U/L (ref 55–165)
ALT SERPL W P-5'-P-CCNC: 25 U/L (ref 7–52)
ANION GAP SERPL CALCULATED.3IONS-SCNC: 11 MMOL/L (ref 4–13)
APTT PPP: 32 SECONDS (ref 26–38)
AST SERPL W P-5'-P-CCNC: 32 U/L (ref 13–39)
ATRIAL RATE: 57 BPM
BACTERIA UR QL AUTO: ABNORMAL /HPF
BASOPHILS # BLD AUTO: 0 THOUSANDS/ΜL (ref 0–0.1)
BASOPHILS NFR BLD AUTO: 1 % (ref 0–2)
BILIRUB SERPL-MCNC: 0.8 MG/DL (ref 0.2–1)
BILIRUB UR QL STRIP: NEGATIVE
BUN SERPL-MCNC: 26 MG/DL (ref 7–25)
CALCIUM SERPL-MCNC: 9.8 MG/DL (ref 8.6–10.5)
CHLORIDE SERPL-SCNC: 94 MMOL/L (ref 98–107)
CLARITY UR: CLEAR
CO2 SERPL-SCNC: 28 MMOL/L (ref 21–31)
COLOR UR: YELLOW
CREAT SERPL-MCNC: 2.06 MG/DL (ref 0.7–1.3)
EOSINOPHIL # BLD AUTO: 0.1 THOUSAND/ΜL (ref 0–0.61)
EOSINOPHIL NFR BLD AUTO: 1 % (ref 0–5)
ERYTHROCYTE [DISTWIDTH] IN BLOOD BY AUTOMATED COUNT: 14 % (ref 11.5–14.5)
GFR SERPL CREATININE-BSD FRML MDRD: 33 ML/MIN/1.73SQ M
GLUCOSE SERPL-MCNC: 215 MG/DL (ref 65–99)
GLUCOSE UR STRIP-MCNC: ABNORMAL MG/DL
HCT VFR BLD AUTO: 36.3 % (ref 42–47)
HGB BLD-MCNC: 12.3 G/DL (ref 14–18)
HGB UR QL STRIP.AUTO: ABNORMAL
INR PPP: 1.22 (ref 0.9–1.5)
KETONES UR STRIP-MCNC: NEGATIVE MG/DL
LEUKOCYTE ESTERASE UR QL STRIP: NEGATIVE
LIPASE SERPL-CCNC: 48 U/L (ref 11–82)
LYMPHOCYTES # BLD AUTO: 0.9 THOUSANDS/ΜL (ref 0.6–4.47)
LYMPHOCYTES NFR BLD AUTO: 12 % (ref 21–51)
MCH RBC QN AUTO: 27.4 PG (ref 26–34)
MCHC RBC AUTO-ENTMCNC: 34 G/DL (ref 31–37)
MCV RBC AUTO: 81 FL (ref 81–99)
MONOCYTES # BLD AUTO: 0.7 THOUSAND/ΜL (ref 0.17–1.22)
MONOCYTES NFR BLD AUTO: 10 % (ref 2–12)
NEUTROPHILS # BLD AUTO: 5.5 THOUSANDS/ΜL (ref 1.4–6.5)
NEUTS SEG NFR BLD AUTO: 77 % (ref 42–75)
NITRITE UR QL STRIP: NEGATIVE
NON-SQ EPI CELLS URNS QL MICRO: ABNORMAL /HPF
P AXIS: 55 DEGREES
PH UR STRIP.AUTO: 8 [PH]
PLATELET # BLD AUTO: 243 THOUSANDS/UL (ref 149–390)
PMV BLD AUTO: 9.4 FL (ref 8.6–11.7)
POTASSIUM SERPL-SCNC: 3.6 MMOL/L (ref 3.5–5.5)
PR INTERVAL: 200 MS
PROT SERPL-MCNC: 7.3 G/DL (ref 6.4–8.9)
PROT UR STRIP-MCNC: NEGATIVE MG/DL
PROTHROMBIN TIME: 14.1 SECONDS (ref 10.2–13)
QRS AXIS: -4 DEGREES
QRSD INTERVAL: 104 MS
QT INTERVAL: 476 MS
QTC INTERVAL: 463 MS
RBC # BLD AUTO: 4.51 MILLION/UL (ref 4.3–5.9)
RBC #/AREA URNS AUTO: ABNORMAL /HPF
SODIUM SERPL-SCNC: 133 MMOL/L (ref 134–143)
SP GR UR STRIP.AUTO: 1.01 (ref 1–1.03)
T WAVE AXIS: 74 DEGREES
TROPONIN I SERPL-MCNC: <0.03 NG/ML
UROBILINOGEN UR QL STRIP.AUTO: 0.2 E.U./DL
VENTRICULAR RATE: 57 BPM
WBC # BLD AUTO: 7.2 THOUSAND/UL (ref 4.8–10.8)
WBC #/AREA URNS AUTO: ABNORMAL /HPF

## 2019-06-16 PROCEDURE — 85025 COMPLETE CBC W/AUTO DIFF WBC: CPT | Performed by: EMERGENCY MEDICINE

## 2019-06-16 PROCEDURE — 81001 URINALYSIS AUTO W/SCOPE: CPT | Performed by: EMERGENCY MEDICINE

## 2019-06-16 PROCEDURE — 36415 COLL VENOUS BLD VENIPUNCTURE: CPT | Performed by: EMERGENCY MEDICINE

## 2019-06-16 PROCEDURE — 85610 PROTHROMBIN TIME: CPT | Performed by: EMERGENCY MEDICINE

## 2019-06-16 PROCEDURE — 96361 HYDRATE IV INFUSION ADD-ON: CPT

## 2019-06-16 PROCEDURE — 96376 TX/PRO/DX INJ SAME DRUG ADON: CPT

## 2019-06-16 PROCEDURE — 93005 ELECTROCARDIOGRAM TRACING: CPT

## 2019-06-16 PROCEDURE — 84484 ASSAY OF TROPONIN QUANT: CPT | Performed by: EMERGENCY MEDICINE

## 2019-06-16 PROCEDURE — 96374 THER/PROPH/DIAG INJ IV PUSH: CPT

## 2019-06-16 PROCEDURE — 93010 ELECTROCARDIOGRAM REPORT: CPT | Performed by: INTERNAL MEDICINE

## 2019-06-16 PROCEDURE — 80053 COMPREHEN METABOLIC PANEL: CPT | Performed by: EMERGENCY MEDICINE

## 2019-06-16 PROCEDURE — 83690 ASSAY OF LIPASE: CPT | Performed by: EMERGENCY MEDICINE

## 2019-06-16 PROCEDURE — 85730 THROMBOPLASTIN TIME PARTIAL: CPT | Performed by: EMERGENCY MEDICINE

## 2019-06-16 PROCEDURE — 99285 EMERGENCY DEPT VISIT HI MDM: CPT

## 2019-06-16 PROCEDURE — 96375 TX/PRO/DX INJ NEW DRUG ADDON: CPT

## 2019-06-16 RX ORDER — METOPROLOL TARTRATE 50 MG/1
50 TABLET, FILM COATED ORAL EVERY 12 HOURS SCHEDULED
COMMUNITY

## 2019-06-16 RX ORDER — ONDANSETRON 2 MG/ML
4 INJECTION INTRAMUSCULAR; INTRAVENOUS ONCE
Status: COMPLETED | OUTPATIENT
Start: 2019-06-16 | End: 2019-06-16

## 2019-06-16 RX ORDER — SODIUM CHLORIDE 9 MG/ML
125 INJECTION, SOLUTION INTRAVENOUS CONTINUOUS
Status: DISCONTINUED | OUTPATIENT
Start: 2019-06-16 | End: 2019-06-16 | Stop reason: HOSPADM

## 2019-06-16 RX ORDER — ISOSORBIDE MONONITRATE 60 MG/1
60 TABLET, EXTENDED RELEASE ORAL
COMMUNITY

## 2019-06-16 RX ORDER — INSULIN GLARGINE 100 [IU]/ML
INJECTION, SOLUTION SUBCUTANEOUS
COMMUNITY
End: 2020-02-20

## 2019-06-16 RX ORDER — MORPHINE SULFATE 4 MG/ML
4 INJECTION, SOLUTION INTRAMUSCULAR; INTRAVENOUS ONCE
Status: COMPLETED | OUTPATIENT
Start: 2019-06-16 | End: 2019-06-16

## 2019-06-16 RX ORDER — FENOFIBRATE 54 MG/1
54 TABLET ORAL DAILY
COMMUNITY

## 2019-06-16 RX ADMIN — SODIUM CHLORIDE 1000 ML: 0.9 INJECTION, SOLUTION INTRAVENOUS at 08:00

## 2019-06-16 RX ADMIN — SODIUM CHLORIDE 125 ML/HR: 0.9 INJECTION, SOLUTION INTRAVENOUS at 09:30

## 2019-06-16 RX ADMIN — MORPHINE SULFATE 4 MG: 4 INJECTION INTRAVENOUS at 07:36

## 2019-06-16 RX ADMIN — ONDANSETRON 4 MG: 2 INJECTION INTRAMUSCULAR; INTRAVENOUS at 07:34

## 2019-06-16 RX ADMIN — ONDANSETRON 4 MG: 2 INJECTION INTRAMUSCULAR; INTRAVENOUS at 09:30

## 2019-08-30 ENCOUNTER — TRANSCRIBE ORDERS (OUTPATIENT)
Dept: ADMINISTRATIVE | Facility: HOSPITAL | Age: 64
End: 2019-08-30

## 2019-08-30 DIAGNOSIS — C64.2 MALIGNANT NEOPLASM OF LEFT KIDNEY, EXCEPT RENAL PELVIS (HCC): Primary | ICD-10-CM

## 2019-09-06 ENCOUNTER — HOSPITAL ENCOUNTER (OUTPATIENT)
Dept: CT IMAGING | Facility: HOSPITAL | Age: 64
Discharge: HOME | End: 2019-09-06
Payer: MEDICARE

## 2019-09-06 DIAGNOSIS — C64.2 MALIGNANT NEOPLASM OF LEFT KIDNEY, EXCEPT RENAL PELVIS (HCC): ICD-10-CM

## 2019-09-06 PROCEDURE — 74176 CT ABD & PELVIS W/O CONTRAST: CPT

## 2019-09-10 ENCOUNTER — TELEPHONE (OUTPATIENT)
Dept: NEPHROLOGY | Facility: CLINIC | Age: 64
End: 2019-09-10

## 2019-09-10 NOTE — TELEPHONE ENCOUNTER
Patient's wife called he needs a refill on Nateglinide 120mg TID   Please send to [facility]

## 2019-09-10 NOTE — TELEPHONE ENCOUNTER
Patient's wife also stated that they cannot afford the Tradjenta  He was receiving samples from our office, however they are no longer available  She said he has to stop taking the medication unless we can find an alternative for him?

## 2019-09-11 NOTE — TELEPHONE ENCOUNTER
I'm not sure if all of his medications are accurate on Epic, however, according to those meds, there aren't that many alternatives  His med list here shows that he is on metformin, is that correct, is he taking metformin    If so, he'll need to stop this medication due to reduced kidney fn

## 2019-09-13 NOTE — TELEPHONE ENCOUNTER
Patient needs a refill on nateglinide 120 TID Please send to [facility] mail order    Please also send a 30 day suply to local pharmacy

## 2019-09-14 DIAGNOSIS — E11.9 TYPE 2 DIABETES MELLITUS WITHOUT COMPLICATION, UNSPECIFIED WHETHER LONG TERM INSULIN USE (HCC): Primary | ICD-10-CM

## 2019-09-14 RX ORDER — NATEGLINIDE 120 MG/1
120 TABLET ORAL
Qty: 270 TABLET | Refills: 1 | Status: SHIPPED | OUTPATIENT
Start: 2019-09-14 | End: 2020-03-04 | Stop reason: SDUPTHER

## 2019-09-14 NOTE — TELEPHONE ENCOUNTER
I sent to she may and a mail-order, I do not have a local pharmacy on record    Please call patient so we can send

## 2019-09-16 ENCOUNTER — TELEPHONE (OUTPATIENT)
Dept: NEPHROLOGY | Facility: CLINIC | Age: 64
End: 2019-09-16

## 2019-09-16 ENCOUNTER — TRANSCRIBE ORDERS (OUTPATIENT)
Dept: ADMINISTRATIVE | Facility: HOSPITAL | Age: 64
End: 2019-09-16

## 2019-09-16 DIAGNOSIS — C64.2 MALIGNANT NEOPLASM OF LEFT KIDNEY, EXCEPT RENAL PELVIS (HCC): Primary | ICD-10-CM

## 2019-09-16 DIAGNOSIS — F32.A DEPRESSION, UNSPECIFIED DEPRESSION TYPE: Primary | ICD-10-CM

## 2019-09-16 NOTE — TELEPHONE ENCOUNTER
Please send to [facility] in [place]

## 2019-09-16 NOTE — TELEPHONE ENCOUNTER
Patient's wife called the patient also needs a refill on:    Sertraline 50mg 1 at night       [facility]

## 2019-10-14 ENCOUNTER — OFFICE VISIT (OUTPATIENT)
Dept: URGENT CARE | Facility: CLINIC | Age: 64
End: 2019-10-14
Payer: MEDICARE

## 2019-10-14 VITALS
HEART RATE: 69 BPM | BODY MASS INDEX: 36.51 KG/M2 | DIASTOLIC BLOOD PRESSURE: 68 MMHG | SYSTOLIC BLOOD PRESSURE: 143 MMHG | WEIGHT: 255 LBS | HEIGHT: 70 IN | RESPIRATION RATE: 18 BRPM | TEMPERATURE: 97.8 F | OXYGEN SATURATION: 96 %

## 2019-10-14 DIAGNOSIS — J06.9 ACUTE URI: Primary | ICD-10-CM

## 2019-10-14 PROCEDURE — G0463 HOSPITAL OUTPT CLINIC VISIT: HCPCS | Performed by: PHYSICIAN ASSISTANT

## 2019-10-14 PROCEDURE — 99213 OFFICE O/P EST LOW 20 MIN: CPT | Performed by: PHYSICIAN ASSISTANT

## 2019-10-14 NOTE — PROGRESS NOTES
[address] [facility]  [facility] [phone]  (fax) [phone]        NAME: Trae Fields is a 75 y o  male  : 1955    MRN: [id number]  DATE: 2019  TIME: 2:26 PM    Assessment and Plan   Acute URI [J06 9]  1  Acute URI         Patient Instructions   Infection appears viral   Recommend symptomatic treatment  Can take ibuprofen or tylenol as needed for pain or fever  Over the counter cough and cold medications to help with symptoms  Use salt water gargles for sore throat and throat lozenges  Cough drops as needed  Wash hands frequently to prevent the spread of infection  If not improving over the next 7-10 days, follow up with PCP  Symptoms may persist for 10-14 days  To present to the ER if symptoms worsen  Chief Complaint     Chief Complaint   Patient presents with    Cold Like Symptoms     cough, sinus pressure nad congestion x 6 days          History of Present Illness   Leonela Horn MYLENE Gretelvanna presents to the clinic c/o    URI    This is a new problem  Episode onset: 6 days ago  The problem has been unchanged  There has been no fever  Associated symptoms include congestion, coughing and a sore throat (since resolved)  Pertinent negatives include no abdominal pain, chest pain, ear pain, headaches, nausea, plugged ear sensation, rash, rhinorrhea, sinus pain, sneezing, swollen glands, vomiting or wheezing  Treatments tried: finished Zpack 2 days ago  The treatment provided no relief  Review of Systems   Review of Systems   Constitutional: Negative for chills, fatigue and fever  HENT: Positive for congestion, sinus pressure, sore throat (since resolved) and tinnitus  Negative for ear discharge, ear pain, postnasal drip, rhinorrhea, sinus pain, sneezing and trouble swallowing  Eyes: Negative for photophobia, pain, discharge, redness, itching and visual disturbance  Respiratory: Positive for cough   Negative for chest tightness, shortness of breath and wheezing  Cardiovascular: Negative for chest pain, palpitations and leg swelling  Gastrointestinal: Negative for abdominal pain, nausea and vomiting  Skin: Negative for color change, pallor, rash and wound  Neurological: Negative for dizziness and headaches  Hematological: Negative for adenopathy           Current Medications     Long-Term Medications   Medication Sig Dispense Refill    amLODIPine (NORVASC) 10 mg tablet       aspirin 81 mg chewable tablet Chew 81 mg      atorvastatin (LIPITOR) 80 mg tablet       fenofibrate (TRICOR) 54 MG tablet Take 54 mg by mouth daily      hydrochlorothiazide (HYDRODIURIL) 25 mg tablet       insulin glargine (LANTUS) 100 units/mL subcutaneous injection Inject under the skin daily at bedtime      isosorbide mononitrate (IMDUR) 60 mg 24 hr tablet Take 60 mg by mouth daily      linaGLIPtin (TRADJENTA) 5 MG TABS Take 5 mg by mouth daily      lisinopril (ZESTRIL) 20 mg tablet       metFORMIN (GLUCOPHAGE) 1000 MG tablet Take 1,000 mg by mouth      metoprolol succinate (TOPROL-XL) 50 mg 24 hr tablet Take 50 mg by mouth      metoprolol tartrate (LOPRESSOR) 50 mg tablet Take 25 mg by mouth every 12 (twelve) hours      nateglinide (STARLIX) 120 mg tablet Take 1 tablet (120 mg total) by mouth 3 (three) times a day before meals 270 tablet 1    nitroglycerin (NITROSTAT) 0 4 mg SL tablet       pioglitazone (ACTOS) 30 mg tablet Take 30 mg by mouth      Potassium Chloride ER 20 MEQ TBCR       potassium citrate (UROCIT-K) 10 mEq       prasugrel (EFFIENT) tablet       sertraline (ZOLOFT) 50 mg tablet Take 1 tablet (50 mg total) by mouth daily 90 tablet 1    traZODone (DESYREL) 100 mg tablet       atorvastatin (LIPITOR) 80 mg tablet Take by mouth      cetirizine (ZyrTEC) 10 mg tablet Take 1 tablet (10 mg total) by mouth daily (Patient not taking: Reported on 10/14/2019) 14 tablet 0       Current Allergies     Allergies as of 10/14/2019 - Reviewed 10/14/2019   Allergen Reaction Noted    Tetracycline  02/02/2017            The following portions of the patient's history were reviewed and updated as appropriate: allergies, current medications, past family history, past medical history, past social history, past surgical history and problem list   Past Medical History:   Diagnosis Date    Angina pectoris, unspecified ([facility] )     Cancer of kidney ([facility] ) 2019    Diabetes mellitus ([facility] )     Hypercholesterolemia     Hypertension     Seasonal allergies      Past Surgical History:   Procedure Laterality Date    ARTHROSCOPY KNEE Bilateral     CARDIAC SURGERY      balloon procedure and stent placement     FOOT SURGERY Right     implants    NEPHRECTOMY Left 06/2019    PARTIAL HIP ARTHROPLASTY Right     SHOULDER ARTHROSCOPY Right     SHOULDER SURGERY Left     removal of bone spur     Social History     Socioeconomic History    Marital status: /Civil Union     Spouse name: Not on file    Number of children: Not on file    Years of education: Not on file    Highest education level: Not on file   Occupational History    Not on file   Social Needs    Financial resource strain: Not on file    Food insecurity:     Worry: Not on file     Inability: Not on file    Transportation needs:     Medical: Not on file     Non-medical: Not on file   Tobacco Use    Smoking status: Never Smoker    Smokeless tobacco: Never Used   Substance and Sexual Activity    Alcohol use: No    Drug use: No    Sexual activity: Not on file   Lifestyle    Physical activity:     Days per week: Not on file     Minutes per session: Not on file    Stress: Not on file   Relationships    Social connections:     Talks on phone: Not on file     Gets together: Not on file     Attends [employer] service: Not on file     Active member of club or organization: Not on file     Attends meetings of clubs or organizations: Not on file     Relationship status: Not on file   [facility] Intimate partner violence:     Fear of current or ex partner: Not on file     Emotionally abused: Not on file     Physically abused: Not on file     Forced sexual activity: Not on file   Other Topics Concern    Not on file   Social History Narrative    Not on file       Objective   /68   Pulse 69   Temp 97 8 °F (36 6 °C)   Resp 18   Ht 5' 10" (1 778 m)   Wt 116 kg (255 lb)   SpO2 96%   BMI 36 59 kg/m²      Physical Exam     Physical Exam   Constitutional: He is oriented to person, place, and time  He appears well-developed and well-nourished  No distress  HENT:   Head: Normocephalic and atraumatic  Right Ear: Tympanic membrane and external ear normal    Left Ear: Tympanic membrane and external ear normal    Nose: Nose normal  Right sinus exhibits no maxillary sinus tenderness and no frontal sinus tenderness  Left sinus exhibits no maxillary sinus tenderness and no frontal sinus tenderness  Mouth/Throat: Oropharynx is clear and moist  No oropharyngeal exudate or posterior oropharyngeal erythema  Eyes: Conjunctivae are normal  Right eye exhibits no discharge  Left eye exhibits no discharge  No scleral icterus  Neck: Normal range of motion  Neck supple  No JVD present  No tracheal deviation present  No thyromegaly present  Cardiovascular: Normal rate, regular rhythm and normal heart sounds  Exam reveals no gallop and no friction rub  No murmur heard  Pulmonary/Chest: Effort normal and breath sounds normal  No stridor  No respiratory distress  He has no decreased breath sounds  He has no wheezes  He has no rhonchi  He has no rales  He exhibits no tenderness  Musculoskeletal: Normal range of motion  He exhibits no tenderness or deformity  Lymphadenopathy:     He has no cervical adenopathy  Neurological: He is alert and oriented to person, place, and time  Coordination normal    Skin: Skin is warm and dry  No rash noted  He is not diaphoretic  No erythema  No pallor     Psychiatric: He has a normal mood and affect  His behavior is normal  Judgment and thought content normal    Nursing note and vitals reviewed        Bernardino Hood PA-C

## 2019-11-14 ENCOUNTER — OFFICE VISIT (OUTPATIENT)
Dept: NEPHROLOGY | Facility: CLINIC | Age: 64
End: 2019-11-14
Payer: MEDICARE

## 2019-11-14 VITALS
SYSTOLIC BLOOD PRESSURE: 134 MMHG | HEIGHT: 70 IN | DIASTOLIC BLOOD PRESSURE: 78 MMHG | HEART RATE: 76 BPM | BODY MASS INDEX: 35.93 KG/M2 | WEIGHT: 251 LBS

## 2019-11-14 DIAGNOSIS — E78.00 PURE HYPERCHOLESTEROLEMIA: ICD-10-CM

## 2019-11-14 DIAGNOSIS — Z90.5 ACQUIRED ABSENCE OF KIDNEY: ICD-10-CM

## 2019-11-14 DIAGNOSIS — I10 ESSENTIAL HYPERTENSION: Primary | ICD-10-CM

## 2019-11-14 DIAGNOSIS — N18.30 STAGE 3 CHRONIC KIDNEY DISEASE (HCC): ICD-10-CM

## 2019-11-14 DIAGNOSIS — E08.22 DIABETES DUE TO UNDRL COND W DIABETIC CHRONIC KIDNEY DISEASE (HCC): ICD-10-CM

## 2019-11-14 PROCEDURE — 99214 OFFICE O/P EST MOD 30 MIN: CPT | Performed by: INTERNAL MEDICINE

## 2019-11-14 NOTE — PROGRESS NOTES
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 ([facility] [address] )       Cardiovascular and Mediastinum    Essential hypertension - Primary       Genitourinary    Stage 3 chronic kidney disease ([facility] [address] )       Other    Acquired absence of kidney    Pure hypercholesterolemia            Subjective:      Patient ID: Cass Cooney is a 75 y o  male  HPI Has CKD 3  Had a left nephrectomy June 11, 2019 for RCCA and did well  His sugar is too high as he cannot afford Tradjenta  Blood pressure is fine  He did not have labs done  He is following a low salt and low fat diet  He has left sided pain for a long time and he has been to multiple specialists and all CT/US are normal  His ortho thinks it is coming from his back    The following portions of the patient's history were reviewed and updated as appropriate: allergies, current medications, past family history, past medical history, past social history, past surgical history and problem list     Review of Systems   Constitutional: Negative for activity change, appetite change, fatigue and unexpected weight change  HENT: Negative for congestion, ear discharge, ear pain, trouble swallowing and voice change  Eyes: Negative for pain, discharge and visual disturbance  Respiratory: Positive for shortness of breath  Negative for cough, chest tightness and wheezing  Cardiovascular: Negative for chest pain, palpitations and leg swelling  Gastrointestinal: Positive for abdominal pain  Negative for blood in stool, constipation, diarrhea, nausea and vomiting  Endocrine: Negative for heat intolerance, polydipsia, polyphagia and polyuria     Genitourinary: Negative for decreased urine volume, difficulty urinating, frequency and urgency  Musculoskeletal: Negative for arthralgias, back pain and gait problem  Skin: Negative for color change and rash  Neurological: Negative for dizziness, weakness and headaches           Social History     Socioeconomic History    Marital status: /Civil Union     Spouse name: None    Number of children: None    Years of education: None    Highest education level: None   Occupational History    Occupation:     Social Needs    Financial resource strain: None    Food insecurity:     Worry: None     Inability: None    Transportation needs:     Medical: None     Non-medical: None   Tobacco Use    Smoking status: Never Smoker    Smokeless tobacco: Never Used   Substance and Sexual Activity    Alcohol use: No    Drug use: No    Sexual activity: None   Lifestyle    Physical activity:     Days per week: None     Minutes per session: None    Stress: None   Relationships    Social connections:     Talks on phone: None     Gets together: None     Attends [employer] service: None     Active member of club or organization: None     Attends meetings of clubs or organizations: None     Relationship status: None    Intimate partner violence:     Fear of current or ex partner: None     Emotionally abused: None     Physically abused: None     Forced sexual activity: None   Other Topics Concern    None   Social History Narrative    Patient is a former smoker - As per [employer]    Current drug user - As per [employer]    Consumes on average 1 cup of regular coffee per day      Past Medical History:   Diagnosis Date    Angina pectoris, unspecified ([facility] [address] )     [facility] 2019    Chronic kidney disease, stage 2 (mild)     Chronic kidney disease, stage 3 ([facility] [address] )     Diabetes mellitus ([facility] [address] )     Essential hypertension     Hypercholesterolemia     Hypertension     Nephrolithiasis     calcium oxalate - had lithotripsy     Seasonal allergies     Type 2 diabetes mellitus ([facility] [address] )     Type II diabetes mellitus, uncontrolled ([facility] [address] )      Past Surgical History:   Procedure Laterality Date    ARTHROSCOPY KNEE Bilateral     CARDIAC SURGERY      balloon procedure and stent placement     FOOT SURGERY Right     implants    LAMINECTOMY      NEPHRECTOMY Left 06/2019    PARTIAL HIP ARTHROPLASTY Right     ROTATOR CUFF REPAIR      SHOULDER ARTHROSCOPY Right     SHOULDER SURGERY Left     removal of bone spur       Current Outpatient Medications:     albuterol (VENTOLIN HFA) 90 mcg/act inhaler, Inhale 2 puffs every 4 (four) hours as needed, Disp: , Rfl:     amLODIPine (NORVASC) 10 mg tablet, , Disp: , Rfl:     aspirin 81 mg chewable tablet, Chew 81 mg, Disp: , Rfl:     atorvastatin (LIPITOR) 80 mg tablet, , Disp: , Rfl:     fenofibrate (TRICOR) 54 MG tablet, Take 54 mg by mouth daily, Disp: , Rfl:     hydrochlorothiazide (HYDRODIURIL) 25 mg tablet, , Disp: , Rfl:     insulin glargine (LANTUS) 100 units/mL subcutaneous injection, Inject under the skin daily at bedtime, Disp: , Rfl:     isosorbide mononitrate (IMDUR) 60 mg 24 hr tablet, Take 60 mg by mouth daily, Disp: , Rfl:     MAGNESIUM PO, Take 500 mg by mouth, Disp: , Rfl:     nateglinide (STARLIX) 120 mg tablet, Take 1 tablet (120 mg total) by mouth 3 (three) times a day before meals, Disp: 270 tablet, Rfl: 1    nitroglycerin (NITROSTAT) 0 4 mg SL tablet, , Disp: , Rfl:     sertraline (ZOLOFT) 50 mg tablet, Take 1 tablet (50 mg total) by mouth daily, Disp: 90 tablet, Rfl: 1    traZODone (DESYREL) 100 mg tablet, , Disp: , Rfl:     atorvastatin (LIPITOR) 80 mg tablet, Take by mouth, Disp: , Rfl:     azithromycin (ZITHROMAX) 250 mg tablet, Take by mouth daily, Disp: , Rfl:     cetirizine (ZyrTEC) 10 mg tablet, Take 1 tablet (10 mg total) by mouth daily (Patient not taking: Reported on 10/14/2019), Disp: 14 tablet, Rfl: 0    linaGLIPtin (TRADJENTA) 5 MG TABS, Take 5 mg by mouth daily, Disp: , Rfl:    metoprolol tartrate (LOPRESSOR) 50 mg tablet, Take 50 mg by mouth every 12 (twelve) hours, Disp: , Rfl:     Potassium Chloride ER 20 MEQ TBCR, , Disp: , Rfl:     Lab Results   Component Value Date     06/19/2018    SODIUM 133 (L) 06/16/2019    K 3 6 06/16/2019    CL 94 (L) 06/16/2019    CO2 28 06/16/2019    ANIONGAP 10 3 06/19/2018    AGAP 11 06/16/2019    BUN 26 (H) 06/16/2019    CREATININE 2 06 (H) 06/16/2019    GLUC 215 (H) 06/16/2019    GLUF 165 (H) 05/06/2019    CALCIUM 9 8 06/16/2019    AST 32 06/16/2019    ALT 25 06/16/2019    ALKPHOS 29 (L) 06/16/2019    PROT 6 6 04/11/2018    TP 7 3 06/16/2019    BILITOT 0 7 04/11/2018    TBILI 0 80 06/16/2019    EGFR 33 06/16/2019     Lab Results   Component Value Date    WBC 7 20 06/16/2019    HGB 12 3 (L) 06/16/2019    HCT 36 3 (L) 06/16/2019    MCV 81 06/16/2019     06/16/2019     Lab Results   Component Value Date    CHOLESTEROL 131 05/06/2019    CHOLESTEROL 151 07/31/2018     Lab Results   Component Value Date    HDL 40 05/06/2019    HDL 47 07/31/2018    HDL 38 (L) 05/15/2018     Lab Results   Component Value Date    LDLCALC 73 05/06/2019    LDLCALC 87 07/31/2018    LDLCALC 81 8 05/15/2018     Lab Results   Component Value Date    TRIG 88 05/06/2019    TRIG 85 07/31/2018    TRIG 99 05/15/2018     No results found for: [place], [place]  Lab Results   Component Value Date    XSE3MKCAWRQM 2 740 05/06/2019     Lab Results   Component Value Date    CALCIUM 9 8 06/16/2019    PHOS 2 5 (L) 04/11/2018     No results found for: SPEP, UPEP  No results found for: ABDI BLUM4HUR        Objective:      /78 (BP Location: Left arm, Patient Position: Sitting, Cuff Size: Adult)   Pulse 76   Ht 5' 10" (1 778 m)   Wt 114 kg (251 lb)   BMI 36 01 kg/m²          Physical Exam   Constitutional: He is oriented to person, place, and time  He appears well-developed and well-nourished  No distress  HENT:   Head: Normocephalic     Right Ear: External ear normal    Left Ear: External ear normal    Nose: Nose normal    Mouth/Throat: Oropharynx is clear and moist    Eyes: Pupils are equal, round, and reactive to light  Conjunctivae are normal    Cardiovascular: Normal rate, regular rhythm and normal heart sounds  No murmur heard  Pulmonary/Chest: Effort normal and breath sounds normal  No respiratory distress  He has no wheezes  He has no rales  Abdominal: Soft  Bowel sounds are normal  He exhibits no distension  There is tenderness  There is no rebound and no guarding  Musculoskeletal: Normal range of motion  He exhibits no edema  Neurological: He is alert and oriented to person, place, and time  Skin: Skin is warm and dry  Capillary refill takes less than 2 seconds  He is not diaphoretic  Psychiatric: He has a normal mood and affect

## 2019-11-18 ENCOUNTER — TRANSCRIBE ORDERS (OUTPATIENT)
Dept: ADMINISTRATIVE | Facility: HOSPITAL | Age: 64
End: 2019-11-18

## 2019-11-18 DIAGNOSIS — R06.02 SOB (SHORTNESS OF BREATH): Primary | ICD-10-CM

## 2019-12-13 ENCOUNTER — HOSPITAL ENCOUNTER (OUTPATIENT)
Dept: PULMONOLOGY | Facility: HOSPITAL | Age: 64
Discharge: HOME | End: 2019-12-13
Payer: MEDICARE

## 2019-12-13 DIAGNOSIS — R06.02 SOB (SHORTNESS OF BREATH): ICD-10-CM

## 2019-12-13 PROCEDURE — 94727 GAS DIL/WSHOT DETER LNG VOL: CPT

## 2019-12-13 PROCEDURE — 94060 EVALUATION OF WHEEZING: CPT

## 2019-12-13 PROCEDURE — 94729 DIFFUSING CAPACITY: CPT | Performed by: INTERNAL MEDICINE

## 2019-12-13 PROCEDURE — 94060 EVALUATION OF WHEEZING: CPT | Performed by: INTERNAL MEDICINE

## 2019-12-13 PROCEDURE — 94727 GAS DIL/WSHOT DETER LNG VOL: CPT | Performed by: INTERNAL MEDICINE

## 2019-12-13 PROCEDURE — 94760 N-INVAS EAR/PLS OXIMETRY 1: CPT

## 2019-12-13 PROCEDURE — 94729 DIFFUSING CAPACITY: CPT

## 2019-12-13 RX ORDER — ALBUTEROL SULFATE 2.5 MG/3ML
2.5 SOLUTION RESPIRATORY (INHALATION) ONCE
Status: COMPLETED | OUTPATIENT
Start: 2019-12-13 | End: 2019-12-13

## 2019-12-13 RX ADMIN — ALBUTEROL SULFATE 2.5 MG: 2.5 SOLUTION RESPIRATORY (INHALATION) at 12:53

## 2020-01-31 ENCOUNTER — TRANSCRIBE ORDERS (OUTPATIENT)
Dept: LAB | Facility: HOSPITAL | Age: 65
End: 2020-01-31

## 2020-01-31 ENCOUNTER — APPOINTMENT (OUTPATIENT)
Dept: LAB | Facility: HOSPITAL | Age: 65
End: 2020-01-31
Payer: MEDICARE

## 2020-01-31 DIAGNOSIS — R94.4 NONSPECIFIC ABNORMAL RESULTS OF KIDNEY FUNCTION STUDY: ICD-10-CM

## 2020-01-31 DIAGNOSIS — E87.5 HYPERPOTASSEMIA: ICD-10-CM

## 2020-01-31 DIAGNOSIS — E87.5 HYPERPOTASSEMIA: Primary | ICD-10-CM

## 2020-01-31 LAB
ANION GAP SERPL CALCULATED.3IONS-SCNC: 10 MMOL/L (ref 4–13)
BUN SERPL-MCNC: 24 MG/DL (ref 7–25)
CALCIUM SERPL-MCNC: 9.8 MG/DL (ref 8.6–10.5)
CHLORIDE SERPL-SCNC: 100 MMOL/L (ref 98–107)
CO2 SERPL-SCNC: 29 MMOL/L (ref 21–31)
CREAT SERPL-MCNC: 2.53 MG/DL (ref 0.7–1.3)
GFR SERPL CREATININE-BSD FRML MDRD: 25 ML/MIN/1.73SQ M
GLUCOSE SERPL-MCNC: 223 MG/DL (ref 65–99)
POTASSIUM SERPL-SCNC: 4.2 MMOL/L (ref 3.5–5.5)
SODIUM SERPL-SCNC: 139 MMOL/L (ref 134–143)

## 2020-01-31 PROCEDURE — 36415 COLL VENOUS BLD VENIPUNCTURE: CPT

## 2020-01-31 PROCEDURE — 80048 BASIC METABOLIC PNL TOTAL CA: CPT

## 2020-02-03 ENCOUNTER — TELEPHONE (OUTPATIENT)
Dept: NEPHROLOGY | Facility: CLINIC | Age: 65
End: 2020-02-03

## 2020-02-03 NOTE — TELEPHONE ENCOUNTER
Ema Lopes stopped in and stated that he has been onToujeo and that worked very well for him, he was always getting samples from us because the price out of pocked with insurance was close to $2000  Because of us stopping samples he was getting Levemir but that doesn't work as well  He wants to know if there is anything you recommend for him to do at this point? Please advise

## 2020-02-04 NOTE — TELEPHONE ENCOUNTER
Levemir will work but he may need a higher dose    Increase the dose by 2 units every other day until his fasting sugars approximately 120-140

## 2020-02-12 ENCOUNTER — TELEPHONE (OUTPATIENT)
Dept: NEPHROLOGY | Facility: CLINIC | Age: 65
End: 2020-02-12

## 2020-02-12 DIAGNOSIS — E08.22 DIABETES DUE TO UNDRL COND W DIABETIC CHRONIC KIDNEY DISEASE (HCC): ICD-10-CM

## 2020-02-12 NOTE — TELEPHONE ENCOUNTER
Received a coverage determination form for patients Tradjenta 5Mg  They are asking if patient has tried anything besides this medication in the past?     Also Pt states he has been taking his Levimier and is going to run out next week  He states he cannot afford it and it does not work for him  His sugar this morning was 218  He states he is also loosing weight  He would like to start a PA for his toujeo  Can you please send this medication to the pharmacy for patient?

## 2020-02-13 LAB
CREAT ?TM UR-SCNC: 146 UMOL/L
EXT MICROALBUMIN URINE RANDOM: 31.3
HBA1C MFR BLD HPLC: 11.3 %
MICROALBUMIN/CREAT UR: 214.4 MG/G{CREAT}

## 2020-02-18 NOTE — TELEPHONE ENCOUNTER
There was a PA started for patients Trajenta but they denied it due to not receiving information in time  Now we have to start another Prior Authorization

## 2020-02-18 NOTE — TELEPHONE ENCOUNTER
This was done - fairly sure  He will have to call [facility] or Cover my Meds - I don't know what to do for the diabetic drugs

## 2020-02-18 NOTE — TELEPHONE ENCOUNTER
The wrong medication was sent  He needs a prescription for his levimier  He also needs a script for Toujeo  Please advise how you would like for patient to take med so a Prior Auth can be started  As for the coverage determination they want to know if he has tried anything besides [facility] in the past?    Please see documentation below

## 2020-02-19 ENCOUNTER — OFFICE VISIT (OUTPATIENT)
Dept: URGENT CARE | Facility: CLINIC | Age: 65
End: 2020-02-19
Payer: MEDICARE

## 2020-02-19 VITALS
HEIGHT: 70 IN | DIASTOLIC BLOOD PRESSURE: 88 MMHG | RESPIRATION RATE: 18 BRPM | BODY MASS INDEX: 33.93 KG/M2 | WEIGHT: 237 LBS | HEART RATE: 67 BPM | TEMPERATURE: 98.8 F | SYSTOLIC BLOOD PRESSURE: 126 MMHG | OXYGEN SATURATION: 96 %

## 2020-02-19 DIAGNOSIS — H61.23 HEARING LOSS DUE TO CERUMEN IMPACTION, BILATERAL: Primary | ICD-10-CM

## 2020-02-19 PROCEDURE — 99212 OFFICE O/P EST SF 10 MIN: CPT | Performed by: PHYSICIAN ASSISTANT

## 2020-02-19 PROCEDURE — G0463 HOSPITAL OUTPT CLINIC VISIT: HCPCS | Performed by: PHYSICIAN ASSISTANT

## 2020-02-19 PROCEDURE — 69210 REMOVE IMPACTED EAR WAX UNI: CPT | Performed by: PHYSICIAN ASSISTANT

## 2020-02-19 NOTE — PROGRESS NOTES
[facility]        NAME: Trae Fields is a 75 y o  male  : 1955    MRN: [id number]  DATE: 2020  TIME: 2:02 PM    Assessment and Plan   Hearing loss due to cerumen impaction, bilateral [H61 23]  1  Hearing loss due to cerumen impaction, bilateral  Ear cerumen removal         Patient Instructions     Patient Instructions   Successful cerumen removal        Follow up with PCP in 3-5 days  Proceed to  ER if symptoms worsen  Chief Complaint     Chief Complaint   Patient presents with    Ear Fullness     b/l eras feel full         History of Present Illness       76 y/o M presents c/o blocked ears x 2 days  Pt uses hearing aids which often cause wax buildup  Starting to have hearing pain and discomfort  No URI sx, fever or chills  No tx attempted      Review of Systems   Review of Systems   Constitutional: Negative for chills and fever  HENT: Positive for ear pain and hearing loss  Negative for congestion, rhinorrhea and sore throat  Respiratory: Negative for cough  Neurological: Negative for headaches           Current Medications       Current Outpatient Medications:     albuterol (VENTOLIN HFA) 90 mcg/act inhaler, Inhale 2 puffs every 4 (four) hours as needed, Disp: , Rfl:     amLODIPine (NORVASC) 10 mg tablet, , Disp: , Rfl:     aspirin 81 mg chewable tablet, Chew 81 mg, Disp: , Rfl:     atorvastatin (LIPITOR) 80 mg tablet, , Disp: , Rfl:     atorvastatin (LIPITOR) 80 mg tablet, Take by mouth, Disp: , Rfl:     azithromycin (ZITHROMAX) 250 mg tablet, Take by mouth daily, Disp: , Rfl:     cetirizine (ZyrTEC) 10 mg tablet, Take 1 tablet (10 mg total) by mouth daily, Disp: 14 tablet, Rfl: 0    fenofibrate (TRICOR) 54 MG tablet, Take 54 mg by mouth daily, Disp: , Rfl:     hydrochlorothiazide (HYDRODIURIL) 25 mg tablet, , Disp: , Rfl:     insulin glargine (LANTUS SOLOSTAR) 100 units/mL injection pen, Inject 60 Units under the skin daily, Disp: 5 pen, Rfl: 6    insulin glargine (LANTUS SOLOSTAR) 100 units/mL injection pen, Inject 60 Units under the skin daily, Disp: 5 pen, Rfl: 5    insulin glargine (LANTUS) 100 units/mL subcutaneous injection, Inject under the skin daily at bedtime, Disp: , Rfl:     isosorbide mononitrate (IMDUR) 60 mg 24 hr tablet, Take 60 mg by mouth daily, Disp: , Rfl:     linaGLIPtin (TRADJENTA) 5 MG TABS, Take 5 mg by mouth daily, Disp: 90 tablet, Rfl: 3    linaGLIPtin (TRADJENTA) 5 MG TABS, Take 5 mg by mouth daily, Disp: 30 tablet, Rfl: 5    MAGNESIUM PO, Take 500 mg by mouth, Disp: , Rfl:     metoprolol tartrate (LOPRESSOR) 50 mg tablet, Take 50 mg by mouth every 12 (twelve) hours, Disp: , Rfl:     nateglinide (STARLIX) 120 mg tablet, Take 1 tablet (120 mg total) by mouth 3 (three) times a day before meals, Disp: 270 tablet, Rfl: 1    nitroglycerin (NITROSTAT) 0 4 mg SL tablet, , Disp: , Rfl:     Potassium Chloride ER 20 MEQ TBCR, , Disp: , Rfl:     sertraline (ZOLOFT) 50 mg tablet, Take 1 tablet (50 mg total) by mouth daily, Disp: 90 tablet, Rfl: 1    traZODone (DESYREL) 100 mg tablet, , Disp: , Rfl:     Current Allergies     Allergies as of 02/19/2020 - Reviewed 02/19/2020   Allergen Reaction Noted    Percocet [oxycodone-acetaminophen]  11/14/2019    Tetracycline  02/02/2017            The following portions of the patient's history were reviewed and updated as appropriate: allergies, current medications, past family history, past medical history, past social history, past surgical history and problem list      Past Medical History:   Diagnosis Date    [facility]ina p[facility]is, unspecified ([facility] [address] )     Cancer of kidney ([facility] [address] ) 2019    Chronic kidney disease, stage 2 (mild)     Chronic kidney disease, stage 3 ([facility] [address] )     Diabetes mellitus ([facility] [address] )     Essential hypertension     Hypercholesterolemia     Hypertension     Nephrolithiasis     calcium oxalate - had lithotripsy     Seasonal allergies     Type 2 diabetes mellitus ([facility] [address] )     Type II diabetes mellitus, uncontrolled ([facility] [address] )        Past Surgical History:   Procedure Laterality Date    ARTHROSCOPY KNEE Bilateral     CARDIAC SURGERY      balloon procedure and stent placement     FOOT SURGERY Right     implants    LAMINECTOMY      NEPHRECTOMY Left 06/2019    PARTIAL HIP ARTHROPLASTY Right     ROTATOR CUFF REPAIR      SHOULDER ARTHROSCOPY Right     SHOULDER SURGERY Left     removal of bone spur       Family History   Problem Relation Age of Onset    Diabetes Mother     Heart attack Father     Heart disease Father          Medications have been verified  Objective   /88   Pulse 67   Temp 98 8 °F (37 1 °C)   Resp 18   Ht 5' 10" (1 778 m)   Wt 108 kg (237 lb)   SpO2 96%   BMI 34 01 kg/m²          Physical Exam     Physical Exam   Constitutional: He is oriented to person, place, and time  He appears well-developed and well-nourished  No distress  HENT:   Cerumen impaction b/l ear canals   Eyes: Pupils are equal, round, and reactive to light  EOM are normal    Neurological: He is alert and oriented to person, place, and time  Skin: He is not diaphoretic  Ear cerumen removal  Date/Time: 2/19/2020 2:02 PM  Performed by: Delmy Norris PA-C  Authorized by: Delmy Norris PA-C     Patient location:  Clinic  Indications / Diagnosis:  Hearing loss due to cerumen impaction  Other Assisting Provider: No    Consent:     Consent obtained:  Verbal    Consent given by:  Patient    Risks discussed:  Bleeding, TM perforation, pain, infection, incomplete removal and dizziness  Procedure details:     Location:  R ear and L ear    Procedure type: irrigation with instrumentation      Instrumentation: curette      Approach:  External  Post-procedure details:     Complication:  None    Hearing quality:  Improved    Patient tolerance of procedure:   Tolerated well, no immediate complications

## 2020-02-20 ENCOUNTER — OFFICE VISIT (OUTPATIENT)
Dept: NEPHROLOGY | Facility: CLINIC | Age: 65
End: 2020-02-20
Payer: MEDICARE

## 2020-02-20 VITALS
HEART RATE: 51 BPM | WEIGHT: 240 LBS | SYSTOLIC BLOOD PRESSURE: 152 MMHG | HEIGHT: 70 IN | OXYGEN SATURATION: 98 % | BODY MASS INDEX: 34.36 KG/M2 | DIASTOLIC BLOOD PRESSURE: 82 MMHG

## 2020-02-20 DIAGNOSIS — R06.02 SOB (SHORTNESS OF BREATH): ICD-10-CM

## 2020-02-20 DIAGNOSIS — N18.4 STAGE 4 CHRONIC KIDNEY DISEASE (HCC): ICD-10-CM

## 2020-02-20 DIAGNOSIS — E08.22 DIABETES DUE TO UNDRL COND W DIABETIC CHRONIC KIDNEY DISEASE (HCC): Primary | ICD-10-CM

## 2020-02-20 DIAGNOSIS — E78.00 PURE HYPERCHOLESTEROLEMIA: ICD-10-CM

## 2020-02-20 DIAGNOSIS — Z90.5 ACQUIRED ABSENCE OF KIDNEY: ICD-10-CM

## 2020-02-20 DIAGNOSIS — I12.9 HYPERTENSIVE CHRONIC KIDNEY DISEASE WITH STAGE 1 THROUGH STAGE 4 CHRONIC KIDNEY DISEASE, OR UNSPECIFIED CHRONIC KIDNEY DISEASE: ICD-10-CM

## 2020-02-20 PROCEDURE — 99214 OFFICE O/P EST MOD 30 MIN: CPT | Performed by: INTERNAL MEDICINE

## 2020-02-20 NOTE — PROGRESS NOTES
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 ([facility] ) - Primary       Genitourinary    Stage 4 chronic kidney disease ([facility] )    Hypertensive chronic kidney disease with stage 1 through stage 4 chronic kidney disease, or unspecified chronic kidney disease       Other    Acquired absence of kidney    Pure hypercholesterolemia    SOB (shortness of breath)            Subjective:      Patient ID: Ty Yadav is a 75 y o  male  HPI  His insurance did not cover Toujeo and he used Levemir for 2 months at 50 units  His insurance will not cover Arik White  We did a prior authorization without help  A1c is 11 3%  He is taking Levemir 50 units once a day and Tradjenta 5mg a day  He is taking nateglinide 120mg three times a day  Insurance allowed [address] but will be $1100 initially then $100 a month  Will have a PET scan by Dr Ana Rosa Edouard scheduled      The following portions of the patient's history were reviewed and updated as appropriate: allergies, current medications, past family history, past medical history, past social history, past surgical history and problem list     Review of Systems   HENT: Negative  Eyes: Negative for visual disturbance  Respiratory: Negative for cough, chest tightness and wheezing  Cardiovascular: Negative for chest pain and leg swelling  Gastrointestinal: Positive for abdominal pain  Negative for abdominal distention, diarrhea and vomiting  He feels a lump in his abdomen and has been worked up   Genitourinary: Negative for decreased urine volume, dysuria and urgency     Musculoskeletal: Positive for arthralgias and back pain    Skin: Negative  Neurological: Negative for dizziness and weakness  Hematological: Negative  Psychiatric/Behavioral: Negative            Social History     Socioeconomic History    Marital status: /Civil Union     Spouse name: None    Number of children: None    Years of education: None    Highest education level: None   Occupational History    Occupation:     Social Needs    Financial resource strain: None    Food insecurity:     Worry: None     Inability: None    Transportation needs:     Medical: None     Non-medical: None   Tobacco Use    Smoking status: Never Smoker    Smokeless tobacco: Never Used   Substance and Sexual Activity    Alcohol use: No    Drug use: No    Sexual activity: None   Lifestyle    Physical activity:     Days per week: None     Minutes per session: None    Stress: None   Relationships    Social connections:     Talks on phone: None     Gets together: None     Attends [employer] service: None     Active member of club or organization: None     Attends meetings of clubs or organizations: None     Relationship status: None    Intimate partner violence:     Fear of current or ex partner: None     Emotionally abused: None     Physically abused: None     Forced sexual activity: None   Other Topics Concern    None   Social History Narrative    Patient is a former smoker - As per [employer]    Current drug user - As per [employer]    Consumes on average 1 cup of regular coffee per day      Past Medical History:   Diagnosis Date    Angina pectoris, unspecified ([facility] )     Cancer of kidney ([place] [address] ) 2019    Chronic kidney disease, stage 2 (mild)     [facility]     Diabetes mellitus ([facility] )     Essential hypertension     Hypercholesterolemia     Hypertension     Nephrolithiasis     calcium oxalate - had lithotripsy     Seasonal allergies     Type 2 diabetes mellitus ([facility] )     Type II diabetes mellitus, uncontrolled ([place] [address] ) Past Surgical History:   Procedure Laterality Date    ARTHROSCOPY KNEE Bilateral     CARDIAC SURGERY      balloon procedure and stent placement     FOOT SURGERY Right     implants    LAMINECTOMY      NEPHRECTOMY Left 06/2019    PARTIAL HIP ARTHROPLASTY Right     ROTATOR CUFF REPAIR      SHOULDER ARTHROSCOPY Right     SHOULDER SURGERY Left     removal of bone spur       Current Outpatient Medications:     albuterol (VENTOLIN HFA) 90 mcg/act inhaler, Inhale 2 puffs every 4 (four) hours as needed, Disp: , Rfl:     amLODIPine (NORVASC) 10 mg tablet, , Disp: , Rfl:     aspirin 81 mg chewable tablet, Chew 81 mg, Disp: , Rfl:     atorvastatin (LIPITOR) 80 mg tablet, , Disp: , Rfl:     atorvastatin (LIPITOR) 80 mg tablet, Take by mouth, Disp: , Rfl:     azithromycin (ZITHROMAX) 250 mg tablet, Take by mouth daily, Disp: , Rfl:     cetirizine (ZyrTEC) 10 mg tablet, Take 1 tablet (10 mg total) by mouth daily, Disp: 14 tablet, Rfl: 0    fenofibrate (TRICOR) 54 MG tablet, Take 54 mg by mouth daily, Disp: , Rfl:     hydrochlorothiazide (HYDRODIURIL) 25 mg tablet, , Disp: , Rfl:     insulin glargine (LANTUS SOLOSTAR) 100 units/mL injection pen, Inject 60 Units under the skin daily, Disp: 5 pen, Rfl: 6    insulin glargine (LANTUS SOLOSTAR) 100 units/mL injection pen, Inject 60 Units under the skin daily, Disp: 5 pen, Rfl: 5    insulin glargine (LANTUS) 100 units/mL subcutaneous injection, Inject under the skin daily at bedtime, Disp: , Rfl:     isosorbide mononitrate (IMDUR) 60 mg 24 hr tablet, Take 60 mg by mouth daily, Disp: , Rfl:     linaGLIPtin (TRADJENTA) 5 MG TABS, Take 5 mg by mouth daily, Disp: 30 tablet, Rfl: 5    MAGNESIUM PO, Take 500 mg by mouth, Disp: , Rfl:     metoprolol tartrate (LOPRESSOR) 50 mg tablet, Take 50 mg by mouth every 12 (twelve) hours, Disp: , Rfl:     nateglinide (STARLIX) 120 mg tablet, Take 1 tablet (120 mg total) by mouth 3 (three) times a day before meals, Disp: 270 tablet, Rfl: 1    nitroglycerin (NITROSTAT) 0 4 mg SL tablet, , Disp: , Rfl:     Potassium Chloride ER 20 MEQ TBCR, , Disp: , Rfl:     rivaroxaban (Xarelto) 2 5 mg tablet, Take 2 5 mg by mouth, Disp: , Rfl:     sertraline (ZOLOFT) 50 mg tablet, Take 1 tablet (50 mg total) by mouth daily, Disp: 90 tablet, Rfl: 1    traZODone (DESYREL) 100 mg tablet, , Disp: , Rfl:     Lab Results   Component Value Date     06/19/2018    SODIUM 139 01/31/2020    K 4 2 01/31/2020     01/31/2020    CO2 29 01/31/2020    ANIONGAP 10 3 06/19/2018    AGAP 10 01/31/2020    BUN 24 01/31/2020    CREATININE 2 53 (H) 01/31/2020    GLUC 223 (H) 01/31/2020    GLUF 165 (H) 05/06/2019    CALCIUM 9 8 01/31/2020    AST 32 06/16/2019    ALT 25 06/16/2019    ALKPHOS 29 (L) 06/16/2019    PROT 6 6 04/11/2018    TP 7 3 06/16/2019    BILITOT 0 7 04/11/2018    TBILI 0 80 06/16/2019    EGFR 25 01/31/2020     Lab Results   Component Value Date    WBC 7 20 06/16/2019    HGB 12 3 (L) 06/16/2019    HCT 36 3 (L) 06/16/2019    MCV 81 06/16/2019     06/16/2019     Lab Results   Component Value Date    CHOLESTEROL 131 05/06/2019    CHOLESTEROL 151 07/31/2018     Lab Results   Component Value Date    HDL 40 05/06/2019    HDL 47 07/31/2018    HDL 38 (L) 05/15/2018     Lab Results   Component Value Date    LDLCALC 73 05/06/2019    LDLCALC 87 07/31/2018    LDLCALC 81 8 05/15/2018     Lab Results   Component Value Date    TRIG 88 05/06/2019    TRIG 85 07/31/2018    TRIG 99 05/15/2018     No results found for: [place], [place]  Lab Results   Component Value Date    JPH2PKSKYIEN 2 740 05/06/2019     Lab Results   Component Value Date    CALCIUM 9 8 01/31/2020    PHOS 2 5 (L) 04/11/2018     No results found for: SPEP, UPEP  No results found for: CLARE BLUM        Objective:      /82 (BP Location: Left arm, Patient Position: Sitting, Cuff Size: Standard)   Pulse (!) 51   Ht 5' 10" (1 778 m)   Wt 109 kg (240 lb)   SpO2 98%   BMI 34 44 kg/m² Physical Exam   Constitutional: He is oriented to person, place, and time  He appears well-developed and well-nourished  No distress  HENT:   Head: Normocephalic  Right Ear: External ear normal    Left Ear: External ear normal    Nose: Nose normal    Mouth/Throat: Oropharynx is clear and moist    Eyes: Pupils are equal, round, and reactive to light  Conjunctivae are normal    Cardiovascular: Normal rate, regular rhythm and normal heart sounds  No murmur heard  Pulmonary/Chest: Effort normal and breath sounds normal  No respiratory distress  He has no wheezes  He has no rales  Abdominal: Soft  Bowel sounds are normal  He exhibits no distension  There is tenderness  There is no rebound and no guarding  Musculoskeletal: Normal range of motion  He exhibits no edema  Neurological: He is alert and oriented to person, place, and time  Skin: Skin is warm and dry  Capillary refill takes less than 2 seconds  He is not diaphoretic  Psychiatric: He has a normal mood and affect

## 2020-02-20 NOTE — PATIENT INSTRUCTIONS
Insulin is too expensive for you   Will switch to Novolin 70/30  This is a combination of 2 insulin  The 70% of the insulin is long acting and covers the afternoon sugars and 30% short acting which covers the morning sugars at the morning dose of insulin    Take a second dose before supper (5-6 pm)  Check sugars at bedtime to see how well the short acting covered your supper and evening sugars   Check a sugar the FOLLOWING MORNING to see how well the long acting covered your bedtime sugars    STOP LEVEMIR AND NATEGLINIDE  CONTINUE TRADJENTA    Wait 24 hours off Levemir before starting the new insulin

## 2020-02-21 ENCOUNTER — TELEPHONE (OUTPATIENT)
Dept: NEPHROLOGY | Facility: CLINIC | Age: 65
End: 2020-02-21

## 2020-02-21 DIAGNOSIS — E08.22 DIABETES DUE TO UNDRL COND W DIABETIC CHRONIC KIDNEY DISEASE (HCC): Primary | ICD-10-CM

## 2020-02-21 NOTE — TELEPHONE ENCOUNTER
Patient called and said he has qualified for a program that will help him financially with his prescriptions  He would like to know if he can continue taking Toujeo  If so, please send in a refill

## 2020-03-02 ENCOUNTER — TELEPHONE (OUTPATIENT)
Dept: NEPHROLOGY | Facility: CLINIC | Age: 65
End: 2020-03-02

## 2020-03-02 NOTE — TELEPHONE ENCOUNTER
Patient called stating that he can't get his sugars to come down      Friday's readings are- 174, 150, 177  Saturdays readings are- 166, 165, 291  Sunday's readings are- 215, 259, 265, 277

## 2020-03-03 NOTE — TELEPHONE ENCOUNTER
Patient aware, however he states that he got new insurance for his medication and is wondering if he can go back on the Toujeo and nateglinde 120 mg?

## 2020-03-04 DIAGNOSIS — E11.9 TYPE 2 DIABETES MELLITUS WITHOUT COMPLICATION, UNSPECIFIED WHETHER LONG TERM INSULIN USE (HCC): ICD-10-CM

## 2020-03-04 DIAGNOSIS — E08.22 DIABETES DUE TO UNDRL COND W DIABETIC CHRONIC KIDNEY DISEASE (HCC): ICD-10-CM

## 2020-03-04 RX ORDER — NATEGLINIDE 120 MG/1
120 TABLET ORAL
Qty: 270 TABLET | Refills: 1 | Status: SHIPPED | OUTPATIENT
Start: 2020-03-04 | End: 2020-03-16 | Stop reason: SDUPTHER

## 2020-03-05 DIAGNOSIS — E11.9 TYPE 2 DIABETES MELLITUS WITHOUT COMPLICATION, UNSPECIFIED WHETHER LONG TERM INSULIN USE (HCC): ICD-10-CM

## 2020-03-05 DIAGNOSIS — E08.22 DIABETES DUE TO UNDRL COND W DIABETIC CHRONIC KIDNEY DISEASE (HCC): ICD-10-CM

## 2020-03-05 NOTE — TELEPHONE ENCOUNTER
Left message that patient can restart the other medications and to make sure that he stops the novolin 70/30  Told him to call back if any other questions

## 2020-03-11 DIAGNOSIS — E08.22 DIABETES DUE TO UNDRL COND W DIABETIC CHRONIC KIDNEY DISEASE (HCC): ICD-10-CM

## 2020-03-11 DIAGNOSIS — E11.9 TYPE 2 DIABETES MELLITUS WITHOUT COMPLICATION, UNSPECIFIED WHETHER LONG TERM INSULIN USE (HCC): ICD-10-CM

## 2020-03-16 ENCOUNTER — TRANSCRIBE ORDERS (OUTPATIENT)
Dept: LAB | Facility: HOSPITAL | Age: 65
End: 2020-03-16

## 2020-03-16 ENCOUNTER — APPOINTMENT (OUTPATIENT)
Dept: LAB | Facility: HOSPITAL | Age: 65
End: 2020-03-16
Payer: MEDICARE

## 2020-03-16 ENCOUNTER — HOSPITAL ENCOUNTER (OUTPATIENT)
Dept: CT IMAGING | Facility: HOSPITAL | Age: 65
Discharge: HOME | End: 2020-03-16
Payer: MEDICARE

## 2020-03-16 DIAGNOSIS — E08.22 DIABETES DUE TO UNDRL COND W DIABETIC CHRONIC KIDNEY DISEASE (HCC): ICD-10-CM

## 2020-03-16 DIAGNOSIS — Z12.5 SPECIAL SCREENING FOR MALIGNANT NEOPLASM OF PROSTATE: ICD-10-CM

## 2020-03-16 DIAGNOSIS — Z12.5 SPECIAL SCREENING FOR MALIGNANT NEOPLASM OF PROSTATE: Primary | ICD-10-CM

## 2020-03-16 DIAGNOSIS — N18.30 STAGE 3 CHRONIC KIDNEY DISEASE (HCC): ICD-10-CM

## 2020-03-16 DIAGNOSIS — E11.9 TYPE 2 DIABETES MELLITUS WITHOUT COMPLICATION, UNSPECIFIED WHETHER LONG TERM INSULIN USE (HCC): ICD-10-CM

## 2020-03-16 DIAGNOSIS — Z90.5 ACQUIRED ABSENCE OF KIDNEY: ICD-10-CM

## 2020-03-16 DIAGNOSIS — C64.2 MALIGNANT NEOPLASM OF LEFT KIDNEY, EXCEPT RENAL PELVIS (HCC): ICD-10-CM

## 2020-03-16 DIAGNOSIS — F32.A DEPRESSION, UNSPECIFIED DEPRESSION TYPE: ICD-10-CM

## 2020-03-16 DIAGNOSIS — I10 ESSENTIAL HYPERTENSION: ICD-10-CM

## 2020-03-16 LAB
ALBUMIN SERPL BCP-MCNC: 4.4 G/DL (ref 3.5–5.7)
ALP SERPL-CCNC: 39 U/L (ref 55–165)
ALT SERPL W P-5'-P-CCNC: 25 U/L (ref 7–52)
ANION GAP SERPL CALCULATED.3IONS-SCNC: 8 MMOL/L (ref 4–13)
AST SERPL W P-5'-P-CCNC: 21 U/L (ref 13–39)
BACTERIA UR QL AUTO: ABNORMAL /HPF
BASOPHILS # BLD AUTO: 0 THOUSANDS/ΜL (ref 0–0.1)
BASOPHILS NFR BLD AUTO: 1 % (ref 0–2)
BILIRUB SERPL-MCNC: 0.5 MG/DL (ref 0.2–1)
BILIRUB UR QL STRIP: NEGATIVE
BUN SERPL-MCNC: 30 MG/DL (ref 7–25)
CALCIUM SERPL-MCNC: 9.6 MG/DL (ref 8.6–10.5)
CHLORIDE SERPL-SCNC: 102 MMOL/L (ref 98–107)
CHOLEST SERPL-MCNC: 134 MG/DL (ref 0–200)
CLARITY UR: CLEAR
CO2 SERPL-SCNC: 30 MMOL/L (ref 21–31)
COLOR UR: ABNORMAL
CREAT SERPL-MCNC: 2.41 MG/DL (ref 0.7–1.3)
CREAT UR-MCNC: 176 MG/DL
CREAT UR-MCNC: 176 MG/DL
EOSINOPHIL # BLD AUTO: 0.2 THOUSAND/ΜL (ref 0–0.61)
EOSINOPHIL NFR BLD AUTO: 3 % (ref 0–5)
ERYTHROCYTE [DISTWIDTH] IN BLOOD BY AUTOMATED COUNT: 15.1 % (ref 11.5–14.5)
GFR SERPL CREATININE-BSD FRML MDRD: 27 ML/MIN/1.73SQ M
GLUCOSE P FAST SERPL-MCNC: 175 MG/DL (ref 65–99)
GLUCOSE UR STRIP-MCNC: ABNORMAL MG/DL
HCT VFR BLD AUTO: 41.5 % (ref 42–47)
HDLC SERPL-MCNC: 35 MG/DL
HGB BLD-MCNC: 13.6 G/DL (ref 14–18)
HGB UR QL STRIP.AUTO: NEGATIVE
KETONES UR STRIP-MCNC: NEGATIVE MG/DL
LDLC SERPL CALC-MCNC: 72 MG/DL (ref 0–100)
LEUKOCYTE ESTERASE UR QL STRIP: NEGATIVE
LYMPHOCYTES # BLD AUTO: 1.4 THOUSANDS/ΜL (ref 0.6–4.47)
LYMPHOCYTES NFR BLD AUTO: 21 % (ref 21–51)
MCH RBC QN AUTO: 26.3 PG (ref 26–34)
MCHC RBC AUTO-ENTMCNC: 32.8 G/DL (ref 31–37)
MCV RBC AUTO: 80 FL (ref 81–99)
MICROALBUMIN UR-MCNC: 409 MG/L (ref 0–20)
MICROALBUMIN/CREAT 24H UR: 232 MG/G CREATININE (ref 0–30)
MONOCYTES # BLD AUTO: 0.5 THOUSAND/ΜL (ref 0.17–1.22)
MONOCYTES NFR BLD AUTO: 8 % (ref 2–12)
NEUTROPHILS # BLD AUTO: 4.3 THOUSANDS/ΜL (ref 1.4–6.5)
NEUTS SEG NFR BLD AUTO: 67 % (ref 42–75)
NITRITE UR QL STRIP: NEGATIVE
NON-SQ EPI CELLS URNS QL MICRO: ABNORMAL /HPF
NONHDLC SERPL-MCNC: 99 MG/DL
PH UR STRIP.AUTO: 5.5 [PH]
PLATELET # BLD AUTO: 221 THOUSANDS/UL (ref 149–390)
PMV BLD AUTO: 8.9 FL (ref 8.6–11.7)
POTASSIUM SERPL-SCNC: 3.7 MMOL/L (ref 3.5–5.5)
PROT SERPL-MCNC: 7.6 G/DL (ref 6.4–8.9)
PROT UR STRIP-MCNC: ABNORMAL MG/DL
PROT UR-MCNC: 52 MG/DL
PROT/CREAT UR: 0.3 MG/G{CREAT} (ref 0–0.1)
PSA SERPL-MCNC: 0.9 NG/ML (ref 0–4)
RBC # BLD AUTO: 5.18 MILLION/UL (ref 4.3–5.9)
RBC #/AREA URNS AUTO: ABNORMAL /HPF
SODIUM SERPL-SCNC: 140 MMOL/L (ref 134–143)
SP GR UR STRIP.AUTO: 1.02 (ref 1–1.03)
TRIGL SERPL-MCNC: 136 MG/DL (ref 44–166)
URATE SERPL-MCNC: 7.4 MG/DL (ref 2.3–7.6)
UROBILINOGEN UR QL STRIP.AUTO: 0.2 E.U./DL
WBC # BLD AUTO: 6.4 THOUSAND/UL (ref 4.8–10.8)
WBC #/AREA URNS AUTO: ABNORMAL /HPF

## 2020-03-16 PROCEDURE — 84550 ASSAY OF BLOOD/URIC ACID: CPT

## 2020-03-16 PROCEDURE — 74176 CT ABD & PELVIS W/O CONTRAST: CPT

## 2020-03-16 PROCEDURE — 81001 URINALYSIS AUTO W/SCOPE: CPT

## 2020-03-16 PROCEDURE — 80053 COMPREHEN METABOLIC PANEL: CPT

## 2020-03-16 PROCEDURE — 82043 UR ALBUMIN QUANTITATIVE: CPT

## 2020-03-16 PROCEDURE — 82570 ASSAY OF URINE CREATININE: CPT

## 2020-03-16 PROCEDURE — 80061 LIPID PANEL: CPT

## 2020-03-16 PROCEDURE — 84156 ASSAY OF PROTEIN URINE: CPT

## 2020-03-16 PROCEDURE — G0103 PSA SCREENING: HCPCS

## 2020-03-16 PROCEDURE — 36415 COLL VENOUS BLD VENIPUNCTURE: CPT

## 2020-03-16 PROCEDURE — 85025 COMPLETE CBC W/AUTO DIFF WBC: CPT

## 2020-03-16 RX ORDER — NATEGLINIDE 120 MG/1
120 TABLET ORAL
Qty: 270 TABLET | Refills: 3 | Status: SHIPPED | OUTPATIENT
Start: 2020-03-16 | End: 2021-01-04 | Stop reason: SDUPTHER

## 2020-03-23 ENCOUNTER — TRANSCRIBE ORDERS (OUTPATIENT)
Dept: ADMINISTRATIVE | Facility: HOSPITAL | Age: 65
End: 2020-03-23

## 2020-03-23 DIAGNOSIS — C64.2 MALIGNANT NEOPLASM OF LEFT KIDNEY, EXCEPT RENAL PELVIS (HCC): Primary | ICD-10-CM

## 2020-04-06 ENCOUNTER — TELEMEDICINE (OUTPATIENT)
Dept: NEPHROLOGY | Facility: CLINIC | Age: 65
End: 2020-04-06
Payer: MEDICARE

## 2020-04-06 VITALS
BODY MASS INDEX: 34.36 KG/M2 | HEIGHT: 70 IN | DIASTOLIC BLOOD PRESSURE: 70 MMHG | SYSTOLIC BLOOD PRESSURE: 120 MMHG | WEIGHT: 240 LBS

## 2020-04-06 DIAGNOSIS — I10 ESSENTIAL HYPERTENSION: ICD-10-CM

## 2020-04-06 DIAGNOSIS — H61.23 HEARING LOSS DUE TO CERUMEN IMPACTION, BILATERAL: ICD-10-CM

## 2020-04-06 DIAGNOSIS — I12.9 HYPERTENSIVE CHRONIC KIDNEY DISEASE WITH STAGE 1 THROUGH STAGE 4 CHRONIC KIDNEY DISEASE, OR UNSPECIFIED CHRONIC KIDNEY DISEASE: ICD-10-CM

## 2020-04-06 DIAGNOSIS — E78.00 PURE HYPERCHOLESTEROLEMIA: ICD-10-CM

## 2020-04-06 DIAGNOSIS — N18.4 STAGE 4 CHRONIC KIDNEY DISEASE (HCC): Primary | ICD-10-CM

## 2020-04-06 DIAGNOSIS — R80.1 PERSISTENT PROTEINURIA: ICD-10-CM

## 2020-04-06 DIAGNOSIS — E08.22 DIABETES DUE TO UNDRL COND W DIABETIC CHRONIC KIDNEY DISEASE (HCC): ICD-10-CM

## 2020-04-06 DIAGNOSIS — N18.30 STAGE 3 CHRONIC KIDNEY DISEASE (HCC): ICD-10-CM

## 2020-04-06 DIAGNOSIS — R06.02 SOB (SHORTNESS OF BREATH): ICD-10-CM

## 2020-04-06 DIAGNOSIS — Z90.5 ACQUIRED ABSENCE OF KIDNEY: ICD-10-CM

## 2020-04-06 PROCEDURE — 99214 OFFICE O/P EST MOD 30 MIN: CPT | Performed by: INTERNAL MEDICINE

## 2020-04-06 RX ORDER — PIOGLITAZONEHYDROCHLORIDE 15 MG/1
15 TABLET ORAL DAILY
Qty: 30 TABLET | Refills: 3 | Status: SHIPPED | OUTPATIENT
Start: 2020-04-06 | End: 2020-09-23 | Stop reason: SDUPTHER

## 2020-04-30 ENCOUNTER — OFFICE VISIT (OUTPATIENT)
Dept: URGENT CARE | Facility: CLINIC | Age: 65
End: 2020-04-30
Payer: MEDICARE

## 2020-04-30 ENCOUNTER — APPOINTMENT (OUTPATIENT)
Dept: RADIOLOGY | Facility: CLINIC | Age: 65
End: 2020-04-30
Payer: MEDICARE

## 2020-04-30 VITALS
BODY MASS INDEX: 34.36 KG/M2 | RESPIRATION RATE: 18 BRPM | WEIGHT: 240 LBS | HEIGHT: 70 IN | HEART RATE: 55 BPM | SYSTOLIC BLOOD PRESSURE: 120 MMHG | OXYGEN SATURATION: 98 % | TEMPERATURE: 97.6 F | DIASTOLIC BLOOD PRESSURE: 62 MMHG
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
## 2020-04-30 DIAGNOSIS — S63.502A SPRAIN OF LEFT WRIST, INITIAL ENCOUNTER: Primary | ICD-10-CM

## 2020-04-30 DIAGNOSIS — M79.642 PAIN OF LEFT HAND: ICD-10-CM

## 2020-04-30 DIAGNOSIS — M25.522 LEFT ELBOW PAIN: ICD-10-CM

## 2020-04-30 PROCEDURE — 99213 OFFICE O/P EST LOW 20 MIN: CPT | Performed by: PHYSICIAN ASSISTANT

## 2020-04-30 PROCEDURE — 73080 X-RAY EXAM OF ELBOW: CPT

## 2020-04-30 PROCEDURE — 73130 X-RAY EXAM OF HAND: CPT

## 2020-04-30 PROCEDURE — 73110 X-RAY EXAM OF WRIST: CPT

## 2020-04-30 PROCEDURE — G0463 HOSPITAL OUTPT CLINIC VISIT: HCPCS | Performed by: PHYSICIAN ASSISTANT

## 2020-05-01 ENCOUNTER — OFFICE VISIT (OUTPATIENT)
Dept: OBGYN CLINIC | Facility: CLINIC | Age: 65
End: 2020-05-01
Payer: MEDICARE

## 2020-05-01 VITALS
DIASTOLIC BLOOD PRESSURE: 74 MMHG | WEIGHT: 247 LBS | HEIGHT: 70 IN | SYSTOLIC BLOOD PRESSURE: 124 MMHG | BODY MASS INDEX: 35.36 KG/M2

## 2020-05-01 DIAGNOSIS — M79.642 LEFT HAND PAIN: Primary | ICD-10-CM
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
## 2020-05-01 DIAGNOSIS — M25.532 PAIN IN LEFT WRIST: ICD-10-CM

## 2020-05-01 PROCEDURE — 99203 OFFICE O/P NEW LOW 30 MIN: CPT | Performed by: ORTHOPAEDIC SURGERY

## 2020-05-11 ENCOUNTER — HOSPITAL ENCOUNTER (OUTPATIENT)
Dept: MRI IMAGING | Facility: HOSPITAL | Age: 65
Discharge: HOME | End: 2020-05-11
Attending: ORTHOPAEDIC SURGERY
Payer: MEDICARE

## 2020-05-11 DIAGNOSIS — M25.532 PAIN IN LEFT WRIST: ICD-10-CM

## 2020-05-11 DIAGNOSIS — M79.642 LEFT HAND PAIN: ICD-10-CM
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
## 2020-05-11 PROCEDURE — 73221 MRI JOINT UPR EXTREM W/O DYE: CPT

## 2020-05-15 ENCOUNTER — OFFICE VISIT (OUTPATIENT)
Dept: OBGYN CLINIC | Facility: CLINIC | Age: 65
End: 2020-05-15
Payer: MEDICARE

## 2020-05-15 VITALS
HEART RATE: 60 BPM | BODY MASS INDEX: 35.36 KG/M2 | WEIGHT: 247 LBS | DIASTOLIC BLOOD PRESSURE: 70 MMHG | HEIGHT: 70 IN | SYSTOLIC BLOOD PRESSURE: 127 MMHG

## 2020-05-15 DIAGNOSIS — M79.642 PAIN OF LEFT HAND: ICD-10-CM

## 2020-05-15 DIAGNOSIS — M25.522 LEFT ELBOW PAIN: ICD-10-CM

## 2020-05-15 DIAGNOSIS — S63.502A SPRAIN OF LEFT WRIST, INITIAL ENCOUNTER: ICD-10-CM

## 2020-05-15 PROCEDURE — 99213 OFFICE O/P EST LOW 20 MIN: CPT | Performed by: ORTHOPAEDIC SURGERY

## 2020-06-12 ENCOUNTER — OFFICE VISIT (OUTPATIENT)
Dept: URGENT CARE | Facility: CLINIC | Age: 65
End: 2020-06-12
Payer: MEDICARE

## 2020-06-12 VITALS
RESPIRATION RATE: 16 BRPM | SYSTOLIC BLOOD PRESSURE: 134 MMHG | DIASTOLIC BLOOD PRESSURE: 68 MMHG | TEMPERATURE: 98.3 F | OXYGEN SATURATION: 97 % | HEART RATE: 56 BPM

## 2020-06-12 DIAGNOSIS — R51.9 NONINTRACTABLE HEADACHE, UNSPECIFIED CHRONICITY PATTERN, UNSPECIFIED HEADACHE TYPE: Primary | ICD-10-CM

## 2020-06-12 PROCEDURE — G0463 HOSPITAL OUTPT CLINIC VISIT: HCPCS | Performed by: PHYSICIAN ASSISTANT

## 2020-06-12 PROCEDURE — 99213 OFFICE O/P EST LOW 20 MIN: CPT | Performed by: PHYSICIAN ASSISTANT

## 2020-09-21 ENCOUNTER — HOSPITAL ENCOUNTER (OUTPATIENT)
Dept: CT IMAGING | Facility: HOSPITAL | Age: 65
Discharge: HOME | End: 2020-09-21
Payer: MEDICARE

## 2020-09-21 DIAGNOSIS — C64.2 MALIGNANT NEOPLASM OF LEFT KIDNEY, EXCEPT RENAL PELVIS (HCC): ICD-10-CM

## 2020-09-21 PROCEDURE — 74176 CT ABD & PELVIS W/O CONTRAST: CPT

## 2020-09-23 ENCOUNTER — TELEPHONE (OUTPATIENT)
Dept: NEPHROLOGY | Facility: CLINIC | Age: 65
End: 2020-09-23

## 2020-09-23 DIAGNOSIS — E08.22 DIABETES DUE TO UNDRL COND W DIABETIC CHRONIC KIDNEY DISEASE (HCC): ICD-10-CM

## 2020-09-23 RX ORDER — PIOGLITAZONEHYDROCHLORIDE 15 MG/1
15 TABLET ORAL DAILY
Qty: 90 TABLET | Refills: 3 | Status: SHIPPED | OUTPATIENT
Start: 2020-09-23 | End: 2021-01-04 | Stop reason: SDUPTHER

## 2020-09-23 NOTE — TELEPHONE ENCOUNTER
He can take 700mg a day with his degree of kidney funciton  So he can take 300mg twice a day and then 100mg in the morning   I can send in the 100mg if he wants and thinks he needs it

## 2020-09-23 NOTE — TELEPHONE ENCOUNTER
Pt stopped in office today requesting a refill  [facility] Muller He also wants to know if its okay that he takes Gabapentin 300mg three times daily? His podiatrist put him on this and he wants to make sure it wont interfere with anything else  He is also having them fax us a diabetic shoe form  They will send it to our back fax machine since we never received it

## 2020-09-23 NOTE — TELEPHONE ENCOUNTER
Pt stopped in office today wanting to know if its okay that he takes Gabapentin 300mg three times daily? His podiatrist put him on this and he wants to make sure it wont interfere with anything else         He is also having them fax us a diabetic shoe form  They will send it to our back fax machine since we never received it

## 2020-09-28 ENCOUNTER — TRANSCRIBE ORDERS (OUTPATIENT)
Dept: ADMINISTRATIVE | Facility: HOSPITAL | Age: 65
End: 2020-09-28

## 2020-09-28 DIAGNOSIS — C64.2 MALIGNANT NEOPLASM OF LEFT KIDNEY, EXCEPT RENAL PELVIS (HCC): Primary | ICD-10-CM

## 2020-11-27 ENCOUNTER — OFFICE VISIT (OUTPATIENT)
Dept: URGENT CARE | Facility: CLINIC | Age: 65
End: 2020-11-27
Payer: MEDICARE

## 2020-11-27 VITALS
RESPIRATION RATE: 16 BRPM | HEART RATE: 68 BPM | HEIGHT: 70 IN | OXYGEN SATURATION: 98 % | BODY MASS INDEX: 35.36 KG/M2 | TEMPERATURE: 98.2 F | DIASTOLIC BLOOD PRESSURE: 68 MMHG | WEIGHT: 247 LBS | SYSTOLIC BLOOD PRESSURE: 141 MMHG

## 2020-11-27 DIAGNOSIS — H69.81 DYSFUNCTION OF RIGHT EUSTACHIAN TUBE: ICD-10-CM

## 2020-11-27 DIAGNOSIS — H61.22 IMPACTED CERUMEN OF LEFT EAR: Primary | ICD-10-CM

## 2020-11-27 PROCEDURE — G0463 HOSPITAL OUTPT CLINIC VISIT: HCPCS | Performed by: PHYSICIAN ASSISTANT

## 2020-11-27 PROCEDURE — 99213 OFFICE O/P EST LOW 20 MIN: CPT | Performed by: PHYSICIAN ASSISTANT

## 2020-11-27 PROCEDURE — 69210 REMOVE IMPACTED EAR WAX UNI: CPT | Performed by: PHYSICIAN ASSISTANT

## 2020-11-27 RX ORDER — GABAPENTIN 300 MG/1
CAPSULE ORAL
COMMUNITY
Start: 2020-09-11 | End: 2021-04-08 | Stop reason: ALTCHOICE

## 2020-12-08 LAB
CREAT ?TM UR-SCNC: 167 UMOL/L
EXT MICROALBUMIN URINE RANDOM: 80.1
EXT PROTEIN URINE: 111
MICROALBUMIN/CREAT UR: 479.6 MG/G{CREAT}

## 2020-12-15 ENCOUNTER — OFFICE VISIT (OUTPATIENT)
Dept: NEPHROLOGY | Facility: CLINIC | Age: 65
End: 2020-12-15
Payer: MEDICARE

## 2020-12-15 VITALS
SYSTOLIC BLOOD PRESSURE: 146 MMHG | HEIGHT: 70 IN | WEIGHT: 263.6 LBS | DIASTOLIC BLOOD PRESSURE: 74 MMHG | HEART RATE: 56 BPM | BODY MASS INDEX: 37.74 KG/M2

## 2020-12-15 DIAGNOSIS — E78.00 PURE HYPERCHOLESTEROLEMIA: ICD-10-CM

## 2020-12-15 DIAGNOSIS — N25.81 SECONDARY HYPERPARATHYROIDISM OF RENAL ORIGIN (HCC): ICD-10-CM

## 2020-12-15 DIAGNOSIS — I12.9 HYPERTENSIVE CHRONIC KIDNEY DISEASE WITH STAGE 1 THROUGH STAGE 4 CHRONIC KIDNEY DISEASE, OR UNSPECIFIED CHRONIC KIDNEY DISEASE: ICD-10-CM

## 2020-12-15 DIAGNOSIS — N18.4 STAGE 4 CHRONIC KIDNEY DISEASE (HCC): Primary | ICD-10-CM

## 2020-12-15 DIAGNOSIS — Z90.5 ACQUIRED ABSENCE OF KIDNEY: ICD-10-CM

## 2020-12-15 DIAGNOSIS — E08.22 DIABETES DUE TO UNDRL COND W DIABETIC CHRONIC KIDNEY DISEASE (HCC): ICD-10-CM

## 2020-12-15 PROCEDURE — 99214 OFFICE O/P EST MOD 30 MIN: CPT | Performed by: INTERNAL MEDICINE

## 2021-01-04 DIAGNOSIS — E08.22 DIABETES DUE TO UNDRL COND W DIABETIC CHRONIC KIDNEY DISEASE (HCC): ICD-10-CM

## 2021-01-04 DIAGNOSIS — F32.A DEPRESSION, UNSPECIFIED DEPRESSION TYPE: ICD-10-CM

## 2021-01-04 DIAGNOSIS — E11.9 TYPE 2 DIABETES MELLITUS WITHOUT COMPLICATION, UNSPECIFIED WHETHER LONG TERM INSULIN USE (HCC): ICD-10-CM

## 2021-01-04 RX ORDER — INSULIN GLARGINE 300 U/ML
INJECTION, SOLUTION SUBCUTANEOUS
Qty: 6 PEN | Refills: 5 | Status: SHIPPED | OUTPATIENT
Start: 2021-01-04 | End: 2021-02-09 | Stop reason: CLARIF

## 2021-01-04 RX ORDER — NATEGLINIDE 120 MG/1
120 TABLET ORAL
Qty: 270 TABLET | Refills: 1 | Status: SHIPPED | OUTPATIENT
Start: 2021-01-04 | End: 2021-12-13 | Stop reason: SDUPTHER

## 2021-01-04 RX ORDER — PIOGLITAZONEHYDROCHLORIDE 15 MG/1
15 TABLET ORAL DAILY
Qty: 90 TABLET | Refills: 1 | Status: SHIPPED | OUTPATIENT
Start: 2021-01-04 | End: 2021-09-09 | Stop reason: SDUPTHER

## 2021-01-08 ENCOUNTER — OFFICE VISIT (OUTPATIENT)
Dept: ENDOCRINOLOGY | Facility: CLINIC | Age: 66
End: 2021-01-08
Payer: MEDICARE

## 2021-01-08 VITALS
DIASTOLIC BLOOD PRESSURE: 64 MMHG | HEIGHT: 70 IN | HEART RATE: 65 BPM | SYSTOLIC BLOOD PRESSURE: 138 MMHG | WEIGHT: 265 LBS | OXYGEN SATURATION: 97 % | BODY MASS INDEX: 37.94 KG/M2

## 2021-01-08 DIAGNOSIS — E08.22 DIABETES DUE TO UNDRL COND W DIABETIC CHRONIC KIDNEY DISEASE (HCC): Primary | ICD-10-CM

## 2021-01-08 DIAGNOSIS — I12.9 HYPERTENSIVE CHRONIC KIDNEY DISEASE WITH STAGE 1 THROUGH STAGE 4 CHRONIC KIDNEY DISEASE, OR UNSPECIFIED CHRONIC KIDNEY DISEASE: ICD-10-CM

## 2021-01-08 DIAGNOSIS — I10 ESSENTIAL HYPERTENSION: ICD-10-CM

## 2021-01-08 PROBLEM — N18.30 STAGE 3 CHRONIC KIDNEY DISEASE (HCC): Status: RESOLVED | Noted: 2019-11-14 | Resolved: 2021-01-08

## 2021-01-08 PROBLEM — M51.37 DEGENERATION OF LUMBOSACRAL INTERVERTEBRAL DISC: Status: ACTIVE | Noted: 2021-01-08

## 2021-01-08 PROBLEM — M54.16 LUMBAR RADICULOPATHY: Status: ACTIVE | Noted: 2019-09-25

## 2021-01-08 PROCEDURE — 99205 OFFICE O/P NEW HI 60 MIN: CPT | Performed by: NURSE PRACTITIONER

## 2021-01-08 NOTE — ASSESSMENT & PLAN NOTE
Lab Results   Component Value Date    EGFR 27 03/16/2020    EGFR 25 01/31/2020    EGFR 33 06/16/2019    CREATININE 2 41 (H) 03/16/2020    CREATININE 2 53 (H) 01/31/2020    CREATININE 2 06 (H) 06/16/2019   Labs reviewed  Patient will continue to follow with Nephrology

## 2021-01-08 NOTE — PROGRESS NOTES
New Patient Progress Note      Chief Complaint   Patient presents with    Diabetes Type 2        History of Present Illness:   Rosaura Browning is a 77 y o  male with hypertension, hyperlipidemia, coronary artery disease, chronic kidney disease stage 4 status post left nephrectomy for renal cell cancer (2017), and type 2 diabetes presents to the office today to establish care  Patient reports being diagnosed with type 2 diabetes approximately 20 years ago through routine blood work  He does have a positive family history of diabetes in both his mother and his brother  He reports that his brother recently passed from kidney failure due to his diabetes  He believes at the time of his diagnosis he was initially started on metformin  However, he was transitioned to insulin fairly quickly  He believes this has primarily to do with his diet  He is currently testing his blood sugars a few times per week  He did not bring a blood sugar log or his glucometer to today's visit for review  He denies polydipsia, polyphagia, and polyuria  He denies blurry vision  He does report complications of neuropathy  However, some of this may be due to a severe work accident that occurred 21 years ago  He has a difficult time being active due to multiple myalgias, arthralgias, and old injuries  Current regimen:  Last HgA1C 2/13/2020 was 11 3, Avg sugar is 11 3     Current regimen: Toujeo- Max (300 u/ml) 60 units in the morning    Tradjenta 5 mg    Nateglinide 120 mg TID with meals    Pioglitazone 15 mg daily      Diet:    Wake 0900 eat a bowl of cereal    Watch tv    130-2pm: lunch- soup; sandwich    Dinner: Vegetable, protein,     Exercise: Will walk when the weather is good, has access to weights       Influenza vaccine:  Up-to-date    Pneumovax:  Up-to-date    Ophthalmology: [place], negative for diabetic retinopathy    Podiatry/Foot care: Dr Kym Emerson; + neuropathy, R foot    Dentist: q6 months    Diabetes education/nutrition: "Many years ago "    For his hypertension, he is taking amlodipine 10 mg, hydrochlorothiazide 25 mg, Imdur 60 mg, and metoprolol tartrate 50 mg twice daily  He denies headache, pedal edema, and orthostatic hypotension  For his hyperlipidemia, he takes 80 mg of atorvastatin daily  For his chronic kidney disease, he is following with Nephrology      Patient Active Problem List   Diagnosis    Acquired absence of kidney    Pure hypercholesterolemia    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 ([facility] )    Essential hypertension    SOB (shortness of breath)    Hearing loss due to cerumen impaction, bilateral    Stage [facility]    Hypertensive chronic kidney disease with stage 1 through stage 4 chronic kidney disease, or unspecified chronic kidney disease    Coronary artery disease involving native coronary artery without angina pectoris    Degeneration of lumbosacral intervertebral disc    Lumbar radiculopathy    Nephrolithiasis      Past Medical History:   Diagnosis Date    Angina pectoris, unspecified ([facility] )     Cancer of kidney ([place] [address] ) 2019    Chronic kidney disease, stage 2 (mild)     Chronic kidney disease, stage 3     Diabetes mellitus [facility]     Essential hypertension     Hypercholesterolemia     Hypertension     Nephrolithiasis     calcium oxalate - had lithotripsy     Seasonal allergies     Stage 3 chronic kidney disease 11/14/2019    Type 2 diabetes mellitus (HCC)     Type II diabetes mellitus, uncontrolled (HCC)       Past Surgical History:   Procedure Laterality Date    ARTHROSCOPY KNEE Bilateral     CARDIAC SURGERY      balloon procedure and stent placement     FOOT SURGERY Right     implants    LAMINECTOMY      NEPHRECTOMY Left 06/2019    PARTIAL HIP ARTHROPLASTY Right     ROTATOR CUFF REPAIR      SHOULDER ARTHROSCOPY Right     SHOULDER SURGERY Left     removal of bone spur      Family History   Problem Relation Age of Onset    Diabetes Mother     Heart attack Father     Heart disease Father      Social History     Tobacco Use    Smoking status: Never Smoker    Smokeless tobacco: Never Used   Substance Use Topics    Alcohol use: No     Allergies   Allergen Reactions    Percocet [Oxycodone-Acetaminophen]     Tetracycline          Current Outpatient Medications:     albuterol (VENTOLIN HFA) 90 mcg/act inhaler, Inhale 2 puffs every 4 (four) hours as needed, Disp: , Rfl:     amLODIPine (NORVASC) 10 mg tablet, , Disp: , Rfl:     aspirin 81 mg chewable tablet, Chew 81 mg, Disp: , Rfl:     atorvastatin (LIPITOR) 80 mg tablet, Take by mouth, Disp: , Rfl:     fenofibrate (TRICOR) 54 MG tablet, Take 54 mg by mouth daily, Disp: , Rfl:     hydrochlorothiazide (HYDRODIURIL) 25 mg tablet, , Disp: , Rfl:     insulin glargine (Toujeo Max SoloStar) 300 units/mL CONCETRATED U-300 injection pen (2-unit dial), 60 units in the morning, Disp: 6 pen, Rfl: 5    isosorbide mononitrate (IMDUR) 60 mg 24 hr tablet, Take 60 mg by mouth daily, Disp: , Rfl:     linaGLIPtin (Tradjenta) 5 MG TABS, Take 5 mg by mouth daily, Disp: 90 mg, Rfl: 3    MAGNESIUM PO, Take 500 mg by mouth, Disp: , Rfl:     metoprolol tartrate (LOPRESSOR) 50 mg tablet, Take 50 mg by mouth every 12 (twelve) hours, Disp: , Rfl:     nateglinide (STARLIX) 120 mg tablet, Take 1 tablet (120 mg total) by mouth 3 (three) times a day before meals, Disp: 270 tablet, Rfl: 1    pioglitazone (ACTOS) 15 mg tablet, Take 1 tablet (15 mg total) by mouth daily, Disp: 90 tablet, Rfl: 1    rivaroxaban (Xarelto) 2 5 mg tablet, Take 2 5 mg by mouth, Disp: , Rfl:     sertraline (ZOLOFT) 50 mg tablet, Take 1 tablet (50 mg total) by mouth daily, Disp: 90 tablet, Rfl: 1    traZODone (DESYREL) 100 mg tablet, , Disp: , Rfl:     diclofenac sodium (VOLTAREN) 1 %, apply 2 grams to affected area four times a day, Disp: , Rfl:     gabapentin (NEURONTIN) 300 mg capsule, take 1 capsule by mouth three times a day (BEGIN WITH 1 CAPSULE A     (REFER TO PRESCRIPTION NOTES)  , Disp: , Rfl:     nitroglycerin (NITROSTAT) 0 4 mg SL tablet, , Disp: , Rfl:     Review of Systems   Constitutional: Positive for fatigue  Negative for activity change, appetite change and unexpected weight change  HENT: Negative for dental problem, sore throat, trouble swallowing and voice change  Eyes: Negative for visual disturbance  Respiratory: Negative for cough, chest tightness and shortness of breath  Cardiovascular: Negative for chest pain, palpitations and leg swelling  Gastrointestinal: Negative for abdominal distention, constipation, diarrhea, nausea and vomiting  Endocrine: Negative for polydipsia, polyphagia and polyuria  Genitourinary: Negative for frequency  Musculoskeletal: Positive for arthralgias, back pain, gait problem, joint swelling, myalgias, neck pain and neck stiffness  Skin: Negative for wound  Allergic/Immunologic: Positive for environmental allergies  Neurological: Positive for weakness and numbness  Negative for dizziness, light-headedness and headaches  Hematological: Does not bruise/bleed easily  Psychiatric/Behavioral: Positive for dysphoric mood and sleep disturbance  Negative for decreased concentration  The patient is not nervous/anxious  Physical Exam:  Body mass index is 38 02 kg/m²  /64 (BP Location: Left arm, Cuff Size: Adult)   Pulse 65   Ht 5' 10" (1 778 m)   Wt 120 kg (265 lb)   SpO2 97%   BMI 38 02 kg/m²    Wt Readings from Last 3 Encounters:   01/08/21 120 kg (265 lb)   12/15/20 120 kg (263 lb 9 6 oz)   11/27/20 112 kg (247 lb)       Physical Exam  Vitals signs reviewed  Constitutional:       General: He is not in acute distress  Appearance: He is well-developed  He is not ill-appearing  HENT:      Head: Normocephalic and atraumatic  Comments: Mask in place  Eyes:      Pupils: Pupils are equal, round, and reactive to light     Neck: Musculoskeletal: Normal range of motion and neck supple  Thyroid: No thyromegaly  Cardiovascular:      Rate and Rhythm: Normal rate and regular rhythm  Pulses: Normal pulses  Heart sounds: Normal heart sounds  Pulmonary:      Effort: Pulmonary effort is normal       Breath sounds: Normal breath sounds  Abdominal:      General: Bowel sounds are normal  There is no distension  Palpations: Abdomen is soft  Tenderness: There is no abdominal tenderness  Musculoskeletal:      Right lower leg: No edema  Left lower leg: No edema  Lymphadenopathy:      Cervical: No cervical adenopathy  Skin:     General: Skin is warm and dry  Capillary Refill: Capillary refill takes less than 2 seconds  Neurological:      Mental Status: He is alert and oriented to person, place, and time  Gait: Gait abnormal (Antalgic gait)     Psychiatric:         Mood and Affect: Mood normal          Behavior: Behavior normal        Labs:   Lab Results   Component Value Date    HGBA1C 11 3 (H) 02/13/2020    HGBA1C 11 3 02/13/2020    HGBA1C 8 9 (H) 07/31/2018     Lab Results   Component Value Date    CREATININE 2 41 (H) 03/16/2020    CREATININE 2 53 (H) 01/31/2020    CREATININE 2 06 (H) 06/16/2019    BUN 30 (H) 03/16/2020     06/19/2018    K 3 7 03/16/2020     03/16/2020    CO2 30 03/16/2020     eGFR   Date Value Ref Range Status   03/16/2020 27 ml/min/1 73sq m Final     Lab Results   Component Value Date    CHOL 140 05/15/2018    HDL 35 (L) 03/16/2020    TRIG 136 03/16/2020     Lab Results   Component Value Date    ALT 25 03/16/2020    AST 21 03/16/2020    ALKPHOS 39 (L) 03/16/2020    BILITOT 0 7 04/11/2018     Lab Results   Component Value Date    RRB9HXQTQTJL 2 740 05/06/2019     No results found for: FREET4, TSI    Impression & Plan:    Problem List Items Addressed This Visit        Endocrine    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 ([facility] ) - Primary     Given patient's reduced kidney function, medications beyond which he is already taking are limited  Discussed initiating mealtime insulin verses discontinuing DPP 4 and starting GLP 1  However, unable to start anything at today's visit as his hemoglobin A1c is nearly 3year-old and he has brought no regular blood sugars with him for review  Therefore, provided him with a blood sugar log  Test blood sugar 3 times daily  Provide log for review in 2 weeks  Obtain ordered labs  Referral provided for diabetes education  Strongly encouraged he and his wife to schedule an appointment to discuss appropriate food choices  Patient expressed some interest in a continuous glucose monitor  However, after further discussion, patient and his wife do not believe he would prefer this  Will continue to discuss depending on future therapies  Lab Results   Component Value Date    HGBA1C 11 3 (H) 02/13/2020            Relevant Orders    HEMOGLOBIN A1C W/ EAG ESTIMATION Lab Collect    Ambulatory referral to Diabetic Education       Cardiovascular and Mediastinum    Essential hypertension     /64  Continue current regimen  Genitourinary    Hypertensive chronic kidney disease with stage 1 through stage 4 chronic kidney disease, or unspecified chronic kidney disease     Lab Results   Component Value Date    EGFR 27 03/16/2020    EGFR 25 01/31/2020    EGFR 33 06/16/2019    CREATININE 2 41 (H) 03/16/2020    CREATININE 2 53 (H) 01/31/2020    CREATININE 2 06 (H) 06/16/2019   Labs reviewed  Patient will continue to follow with Nephrology                 Orders Placed This Encounter   Procedures    HEMOGLOBIN A1C W/ EAG ESTIMATION Lab Collect     Standing Status:   Future     Standing Expiration Date:   1/8/2022    Ambulatory referral to Diabetic Education     Standing Status:   Future     Standing Expiration Date:   1/8/2022     Referral Priority:   Routine     Referral Type:   Consult - AMB     Referral Reason: Specialty Services Required     Requested Specialty:   Diabetes Services     Number of Visits Requested:   1     Expiration Date:   1/8/2022       There are no Patient Instructions on file for this visit  Discussed with the patient and all questioned fully answered  He will call me if any problems arise  Follow-up appointment in 3 months       Counseled patient on diagnostic results, prognosis, risk and benefit of treatment options, instruction for management, importance of treatment compliance, Risk  factor reduction and impressions    TORREY Beth

## 2021-01-08 NOTE — ASSESSMENT & PLAN NOTE
Given patient's reduced kidney function, medications beyond which he is already taking are limited  Discussed initiating mealtime insulin verses discontinuing DPP 4 and starting GLP 1  However, unable to start anything at today's visit as his hemoglobin A1c is nearly 3year-old and he has brought no regular blood sugars with him for review  Therefore, provided him with a blood sugar log  Test blood sugar 3 times daily  Provide log for review in 2 weeks  Obtain ordered labs  Referral provided for diabetes education  Strongly encouraged he and his wife to schedule an appointment to discuss appropriate food choices  Patient expressed some interest in a continuous glucose monitor  However, after further discussion, patient and his wife do not believe he would prefer this  Will continue to discuss depending on future therapies    Lab Results   Component Value Date    HGBA1C 11 3 (H) 02/13/2020

## 2021-01-09 ENCOUNTER — LAB (OUTPATIENT)
Dept: LAB | Facility: HOSPITAL | Age: 66
End: 2021-01-09
Payer: MEDICARE

## 2021-01-09 DIAGNOSIS — E08.22 DIABETES DUE TO UNDRL COND W DIABETIC CHRONIC KIDNEY DISEASE (HCC): ICD-10-CM

## 2021-01-09 LAB
EST. AVERAGE GLUCOSE BLD GHB EST-MCNC: 166 MG/DL
HBA1C MFR BLD: 7.4 %

## 2021-01-09 PROCEDURE — 36415 COLL VENOUS BLD VENIPUNCTURE: CPT

## 2021-01-09 PROCEDURE — 83036 HEMOGLOBIN GLYCOSYLATED A1C: CPT

## 2021-01-11 DIAGNOSIS — E08.22 DIABETES DUE TO UNDRL COND W DIABETIC CHRONIC KIDNEY DISEASE (HCC): ICD-10-CM

## 2021-01-11 RX ORDER — LINAGLIPTIN 5 MG/1
5 TABLET, FILM COATED ORAL DAILY
Qty: 90 MG | Refills: 3 | Status: SHIPPED | OUTPATIENT
Start: 2021-01-11 | End: 2021-08-11 | Stop reason: ALTCHOICE

## 2021-01-13 ENCOUNTER — TELEPHONE (OUTPATIENT)
Dept: ENDOCRINOLOGY | Facility: CLINIC | Age: 66
End: 2021-01-13

## 2021-01-13 NOTE — TELEPHONE ENCOUNTER
----- Message from [address] [place] sent at 1/13/2021 12:01 PM EST -----  HgA1C is significantly lower at 7 3%  This is down from 11 3% 11 months ago  Goal is <7%  Continue current regimen  Send in blood sugar logs when ready

## 2021-01-13 NOTE — RESULT ENCOUNTER NOTE
HgA1C is significantly lower at 7 3%  This is down from 11 3% 11 months ago  Goal is <7%  Continue current regimen  Send in blood sugar logs when ready

## 2021-01-13 NOTE — TELEPHONE ENCOUNTER
Pt notified with results  Very frustrated with glucose numbers at home  Feels he is doing so well but numbers are elevated  Eats breakfast 9am, lunch 1:30 and dinner about 6p,  NO bedtime snack  Goes to bed midnight and  Wakes up at 9am, tests about 9:30    Explained the appropriate testing times, over night fast no longer than 10 hours and highly recommended Diabetes education  Pt notes he really doesn't want to do classes, no computer  Aware he can have an initial MNT appt with dietician and hopefully that will be a good refresher for him   Appt given for next Wednesday with Emma Lama

## 2021-01-20 ENCOUNTER — OFFICE VISIT (OUTPATIENT)
Dept: DIABETES SERVICES | Facility: CLINIC | Age: 66
End: 2021-01-20
Payer: MEDICARE

## 2021-01-20 VITALS — BODY MASS INDEX: 37.65 KG/M2 | HEIGHT: 70 IN | WEIGHT: 263 LBS

## 2021-01-20 DIAGNOSIS — E08.22 DIABETES DUE TO UNDRL COND W DIABETIC CHRONIC KIDNEY DISEASE (HCC): ICD-10-CM

## 2021-01-20 PROCEDURE — 97802 MEDICAL NUTRITION INDIV IN: CPT | Performed by: DIETITIAN, REGISTERED

## 2021-01-20 NOTE — PROGRESS NOTES
Medical Nutrition Therapy        Assessment    Visit Type: Initial visit    Chief complaint Pt has type 2 diabetes for many years  He complains that his diabetes is harder to control and he doesn't think he is doing anything different  He reports some blood sugar levels and is alarmed that some are elevated after exercising or overnight  He reports recently reading about all the side effects from his diabetes medications and is worried now about whether it's worth taking the meds  HPI: Baron Ortezdoroteo diet history reveals inconsistent intake  His diet provides plenty of vegetables, some whole grains, lean proteins, a little fruit, and reduced fat milk  He eats little dairy or processed meat at this time  Of concern is the widely fluctuating carbohydrate content of his meals and it is likely that this contributes to his variable BG readings  Currently meals range from 0 to 75 grams of carbohydrate  Explained basic pathophysiology of diabetes and impact of diet on blood glucose levels  Together we discussed what foods contain CHO, reading a food label, and serving sizes  Used the portion booklet to teach Indira Lund more about food groups and basic carbohydrate counting  Created an individualized meal plan for Indira Lund with 3 meals and 1-2 snacks providing 45-60 g carb per meal and 15-20 g carb per snack  This plan will help promote weight loss  Put together sample meals for [facility] reference  Indira Lund demonstrated fair understanding, his wife was following along and demonstrated understanding as well  They will come in soon for new meter teaching and in 2 months for MNT follow-up  Indira Lund will call with questions or for more education      Ht Readings from Last 1 Encounters:   01/08/21 5' 10" (1 778 m)     Wt Readings from Last 2 Encounters:   01/08/21 120 kg (265 lb)   12/15/20 120 kg (263 lb 9 6 oz)     Weight Change: No    Medical Diagnosis/ICD10: E08 22    Barriers to Learning: no barriers    Do you follow any special diet presently?: not a diet, eating a lot of veggies, made a veggie soup and eating it a lot  Who shops: patient and spouse  Who cooks: patient and spouse    Food Log: Completed via the method of food recall    Breakfast:9am, 1 cup cheerios, 2% lactaid milk 3/4 cup OR 3 scrambled eggs or egg sandwich on wheat w/ fried egg  Morning Snack:none  Lunch:1-1:30, 1 can Progresso chicken and rice soup, wheat saltine crackers, 15-20  Afternoon Snack: none  Dinner: 6pm, homemade vegetable soup, often has fish or chicken, sweet potato, vegetables  Evening Snack: 8:30-9, small bowl of ice cream OR bag of microwave popcorn OR handful or grapes or peaches  Beverages: water, cran-grape juice diluted in half with water every few days, rare decaf  Eating out/Take out:very little  Exercise started walking recently,15 minutes 2-3 times per day    Calorie needs 1715 kcals/day Carbs: 45-60g/meal, 15-20g/snack         Nutrition Diagnosis:  Inconsistent carbohydrate intake  intake related to Physiological causes requiring careful timing and consistency in the amount of carbohydrate (i e  diabetes mellitus, hypoglycemia) as evidenced by  Estimated carbohydrate intake that is different from recommended types or ingested on an irregular basis    Intervention: plate method, carbohydrate counting, meal timing, meal planning, individualized meal plan, monitoring portion control, exercise guidelines and food diary     Treatment Goals: Patient understands education and recommendations, Patient will monitor portion control and Patient will count carbohydrates    Monitoring and evaluation:    Term code indicator  FH 1 3 2 Food Intake Criteria: Use measuring cups   Term code indicator  FH 1 6 3 Carbohydrate Intake Criteria: Follow meal plan    Patients Response to Instruction:  Comprehensiongood  Motivationgood  Expected Compliancegood    Thank you for coming to the [facility] for education today    Please feel free to call with any questions or concerns      Sabina Meyer  [phone] [facility] [id number] [phone]

## 2021-01-20 NOTE — PATIENT INSTRUCTIONS
1  Follow meal plan, mainly concentrate on carbohydrates  2  Keep exercising  3  Test blood sugars around meals and exercise  4   Keep 3 day food diary before next visit

## 2021-01-27 ENCOUNTER — TELEPHONE (OUTPATIENT)
Dept: ENDOCRINOLOGY | Facility: CLINIC | Age: 66
End: 2021-01-27

## 2021-02-02 NOTE — TELEPHONE ENCOUNTER
Current regimen:      Toujeo- Max (300 u/ml) 60 units in the morning     Tradjenta 5 mg     Nateglinide 120 mg TID with meals     Pioglitazone 15 mg daily    He has a few elevated sugars, typically occurring mid-day  These appear to be diet related  I am pleased he is meeting with the nutritionist  I reviewed the note from that meeting and believe that if he can follow a more consistent carbohydrate intake, his sugars will even out  We can also start a GLP-1- once weekly injection that we had discussed if he would prefer  This will help with reducing sugar spikes with meals as well as weight loss  I appreciate his frustration with sugars in the 200s but these are rare and appear diet related  Also, his morning highs are preceded by nighttime highs  That also indicated diet as the source of the elevation

## 2021-02-03 ENCOUNTER — OFFICE VISIT (OUTPATIENT)
Dept: DIABETES SERVICES | Facility: CLINIC | Age: 66
End: 2021-02-03
Payer: MEDICARE

## 2021-02-03 ENCOUNTER — TELEPHONE (OUTPATIENT)
Dept: DIABETES SERVICES | Facility: CLINIC | Age: 66
End: 2021-02-03

## 2021-02-03 DIAGNOSIS — E08.22 DIABETES DUE TO UNDRL COND W DIABETIC CHRONIC KIDNEY DISEASE (HCC): Primary | ICD-10-CM

## 2021-02-03 DIAGNOSIS — E08.22 DIABETES MELLITUS DUE TO UNDERLYING CONDITION WITH DIABETIC CHRONIC KIDNEY DISEASE, UNSPECIFIED CKD STAGE, UNSPECIFIED WHETHER LONG TERM INSULIN USE (HCC): Primary | ICD-10-CM

## 2021-02-03 PROCEDURE — G0108 DIAB MANAGE TRN  PER INDIV: HCPCS | Performed by: DIETITIAN, REGISTERED

## 2021-02-03 RX ORDER — LANCETS 33 GAUGE
EACH MISCELLANEOUS
Qty: 100 EACH | Refills: 3 | Status: SHIPPED | OUTPATIENT
Start: 2021-02-03 | End: 2021-04-08 | Stop reason: SDUPTHER

## 2021-02-03 RX ORDER — BLOOD SUGAR DIAGNOSTIC
STRIP MISCELLANEOUS
Qty: 200 EACH | Refills: 3 | Status: SHIPPED | OUTPATIENT
Start: 2021-02-03 | End: 2021-04-08 | Stop reason: SDUPTHER

## 2021-02-03 NOTE — PROGRESS NOTES
Monitoring Blood Sugars    Instructions for Meter Teaching- Patient instructed in the following:  Site selection and skin preparation, Loading strips and lancet device, meter activation, obtaining blood sample, test strip and lancet disposal and recording log book entries  Patient has good understanding of material covered and was able to test their own blood sugar in office today  Comments: Gave and instructed on One Touch Verio Flex meter, Patient demonstrated use, blood sugar in office 167 mg/dl  at  9:20 am     Lot #: [id number]  Exp Date: 5/31/21    Testing frequency: Encouraged pair testing  Test sugars before a meal and 2hr after the same meal, rotating between breakfast, lunch, and dinner  Test sugars twice a day (3 days a week, 7 days a week)  Goal Blood Sugars:   Premeal , even better <110  2hr after a meal <180, even better <140  A1C <7%, even better <6 5%  Diabetes Education Record: Alaina Carpenter was given the following education material: [employer]     Patient response to instruction  Comprehension: very good  Motivation: very good  Expected Compliance: very good  Readiness: action  Barriers to Learning: none known     Begin Time: 9:05  End Time: 9:45  Referring Provider: Philipp Guzman    Thank you for referring your patient to [facility], it was a pleasure working with them today  Please feel free to call with any questions or concerns      Aria Meyer  [address] [facility] [address] [phone]

## 2021-02-03 NOTE — PATIENT INSTRUCTIONS
1  Check blood sugars 3 times daily  2  Use old meter until your supply of new test strips comes in  3   Continue to keep records for Rachelle Pfeiffer

## 2021-02-03 NOTE — TELEPHONE ENCOUNTER
Pt seen today for new meter teaching  Please send order to [facility] mail order pharmacy on file  He needs One Touch Delica Lancets and One Rayspan test strips  You ordered checking BG levels TID

## 2021-02-03 NOTE — TELEPHONE ENCOUNTER
Called pt and advised and at this time he prefers to NOT add another medication    Feels once he is more active and with diet modification he will be ok

## 2021-02-09 ENCOUNTER — TELEPHONE (OUTPATIENT)
Dept: ENDOCRINOLOGY | Facility: CLINIC | Age: 66
End: 2021-02-09

## 2021-02-09 DIAGNOSIS — E11.22 TYPE 2 DIABETES MELLITUS WITH STAGE 4 CHRONIC KIDNEY DISEASE, WITH LONG-TERM CURRENT USE OF INSULIN (HCC): Primary | ICD-10-CM

## 2021-02-09 DIAGNOSIS — N18.4 TYPE 2 DIABETES MELLITUS WITH STAGE 4 CHRONIC KIDNEY DISEASE, WITH LONG-TERM CURRENT USE OF INSULIN (HCC): Primary | ICD-10-CM

## 2021-02-09 DIAGNOSIS — Z79.4 TYPE 2 DIABETES MELLITUS WITH STAGE 4 CHRONIC KIDNEY DISEASE, WITH LONG-TERM CURRENT USE OF INSULIN (HCC): Primary | ICD-10-CM

## 2021-02-09 DIAGNOSIS — E08.22 DIABETES DUE TO UNDRL COND W DIABETIC CHRONIC KIDNEY DISEASE (HCC): ICD-10-CM

## 2021-02-09 RX ORDER — INSULIN DETEMIR 100 [IU]/ML
INJECTION, SOLUTION SUBCUTANEOUS
Qty: 15 PEN | Refills: 1 | Status: CANCELLED | OUTPATIENT
Start: 2021-02-09

## 2021-02-09 RX ORDER — INSULIN DETEMIR 100 [IU]/ML
INJECTION, SOLUTION SUBCUTANEOUS
Qty: 15 PEN | Refills: 1 | Status: SHIPPED | OUTPATIENT
Start: 2021-02-09 | End: 2021-02-12 | Stop reason: CLARIF

## 2021-02-09 NOTE — TELEPHONE ENCOUNTER
Called pt to advised the change to Levemirt  Pt notes he has tried Levemir and Lantus, did not get god controil of sugars  Katina Neil has him better controlled  A1c now 7 4  Pt refuses to try any other medications   Aware I would relay to provider and include on documentation to be faxed back

## 2021-02-09 NOTE — TELEPHONE ENCOUNTER
Fax notice from [facility] noting Toujeo is non formulary  Alternatives listed are Maribel Burrell    Documentation to be completed and new Rx will need to be entered

## 2021-02-10 PROBLEM — E11.22 TYPE 2 DIABETES MELLITUS WITH DIABETIC CHRONIC KIDNEY DISEASE (HCC): Status: ACTIVE | Noted: 2021-02-10

## 2021-02-10 RX ORDER — INSULIN GLARGINE 300 U/ML
INJECTION, SOLUTION SUBCUTANEOUS
Qty: 12 PEN | Refills: 1 | Status: SHIPPED | OUTPATIENT
Start: 2021-02-10 | End: 2021-08-26

## 2021-03-02 ENCOUNTER — OFFICE VISIT (OUTPATIENT)
Dept: URGENT CARE | Facility: CLINIC | Age: 66
End: 2021-03-02
Payer: MEDICARE

## 2021-03-02 VITALS
TEMPERATURE: 97.9 F | RESPIRATION RATE: 18 BRPM | WEIGHT: 263 LBS | OXYGEN SATURATION: 96 % | DIASTOLIC BLOOD PRESSURE: 80 MMHG | BODY MASS INDEX: 37.65 KG/M2 | HEART RATE: 70 BPM | SYSTOLIC BLOOD PRESSURE: 150 MMHG | HEIGHT: 70 IN

## 2021-03-02 DIAGNOSIS — H69.83 DYSFUNCTION OF BOTH EUSTACHIAN TUBES: Primary | ICD-10-CM

## 2021-03-02 PROCEDURE — 99213 OFFICE O/P EST LOW 20 MIN: CPT | Performed by: PHYSICIAN ASSISTANT

## 2021-03-02 PROCEDURE — G0463 HOSPITAL OUTPT CLINIC VISIT: HCPCS | Performed by: PHYSICIAN ASSISTANT

## 2021-03-02 NOTE — PROGRESS NOTES
[facility]        NAME: Andrews Hines is a 77 y o  male  : 1955    MRN: [id number]  DATE: 2021  TIME: 6:17 PM    Assessment and Plan   Dysfunction of both eustachian tubes [H69 83]  1  Dysfunction of both eustachian tubes           Patient Instructions     Flonase over the counter  Saline nasal spray over the counter   Follow up with PCP in 3-5 days  Proceed to  ER if symptoms worsen  Chief Complaint     Chief Complaint   Patient presents with    Ear Fullness     bilateral x 1 week         History of Present Illness       Ear Fullness   There is pain in both ears  This is a new problem  The current episode started in the past 7 days  There has been no fever  The pain is moderate  Associated symptoms include hearing loss  Pertinent negatives include no coughing, ear discharge, headaches, rhinorrhea or sore throat  Treatments tried: debrox and irrigation  Review of Systems   Review of Systems   Constitutional: Negative for chills and fever  HENT: Positive for ear pain and hearing loss  Negative for congestion, ear discharge, rhinorrhea, sinus pressure, sinus pain, sneezing and sore throat  Respiratory: Negative for cough  Neurological: Negative for headaches  Current Medications       Current Outpatient Medications:     albuterol (VENTOLIN HFA) 90 mcg/act inhaler, Inhale 2 puffs every 4 (four) hours as needed, Disp: , Rfl:     amLODIPine (NORVASC) 10 mg tablet, , Disp: , Rfl:     aspirin 81 mg chewable tablet, Chew 81 mg, Disp: , Rfl:     atorvastatin (LIPITOR) 80 mg tablet, Take by mouth, Disp: , Rfl:     diclofenac sodium (VOLTAREN) 1 %, apply 2 grams to affected area four times a day, Disp: , Rfl:     fenofibrate (TRICOR) 54 MG tablet, Take 54 mg by mouth daily, Disp: , Rfl:     gabapentin (NEURONTIN) 300 mg capsule, take 1 capsule by mouth three times a day (BEGIN WITH 1 CAPSULE A     (REFER TO PRESCRIPTION NOTES)  , Disp: , Rfl:     glucose blood (OneTouch Verio) test strip, Use to test blood sugar three times daily  , Disp: 200 each, Rfl: 3    hydrochlorothiazide (HYDRODIURIL) 25 mg tablet, , Disp: , Rfl:     insulin glargine (Toujeo SoloStar) 300 units/mL CONCETRATED U-300 injection pen (1-unit dial), Inject 60 units daily  , Disp: 12 pen, Rfl: 1    isosorbide mononitrate (IMDUR) 60 mg 24 hr tablet, Take 60 mg by mouth daily, Disp: , Rfl:     linaGLIPtin (Tradjenta) 5 MG TABS, Take 5 mg by mouth daily, Disp: 90 mg, Rfl: 3    MAGNESIUM PO, Take 500 mg by mouth, Disp: , Rfl:     metoprolol tartrate (LOPRESSOR) 50 mg tablet, Take 50 mg by mouth every 12 (twelve) hours, Disp: , Rfl:     nateglinide (STARLIX) 120 mg tablet, Take 1 tablet (120 mg total) by mouth 3 (three) times a day before meals, Disp: 270 tablet, Rfl: 1    nitroglycerin (NITROSTAT) 0 4 mg SL tablet, , Disp: , Rfl:     OneTouch Delica Lancets 99H MISC, Use to test blood sugar three times daily  , Disp: 100 each, Rfl: 3    pioglitazone (ACTOS) 15 mg tablet, Take 1 tablet (15 mg total) by mouth daily, Disp: 90 tablet, Rfl: 1    rivaroxaban (Xarelto) 2 5 mg tablet, Take 2 5 mg by mouth, Disp: , Rfl:     sertraline (ZOLOFT) 50 mg tablet, Take 1 tablet (50 mg total) by mouth daily, Disp: 90 tablet, Rfl: 1    traZODone (DESYREL) 100 mg tablet, , Disp: , Rfl:     Current Allergies     Allergies as of 03/02/2021 - Reviewed 03/02/2021   Allergen Reaction Noted    Percocet [oxycodone-acetaminophen]  11/14/2019    Tetracycline  02/02/2017            The following portions of the patient's history were reviewed and updated as appropriate: allergies, current medications, past family history, past medical history, past social history, past surgical history and problem list      Past Medical History:   Diagnosis Date    Angina pectoris, unspecified [facility] )     Cancer of kidney ([facility] [facility] ) 2019    Chronic kidney disease, stage 2 (mild)     Chronic kidney disease, stage 3     [facility]  Essential hypertension     Hypercholesterolemia     Hypertension     Nephrolithiasis     calcium oxalate - had lithotripsy     Seasonal allergies     Stage 3 chronic kidney disease 11/14/2019    Type 2 diabetes mellitus (HCC)     Type II diabetes mellitus, uncontrolled (HCC)        Past Surgical History:   Procedure Laterality Date    ARTHROSCOPY KNEE Bilateral     CARDIAC SURGERY      balloon procedure and stent placement     FOOT SURGERY Right     implants    LAMINECTOMY      NEPHRECTOMY Left 06/2019    PARTIAL HIP ARTHROPLASTY Right     ROTATOR CUFF REPAIR      SHOULDER ARTHROSCOPY Right     SHOULDER SURGERY Left     removal of bone spur       Family History   Problem Relation Age of Onset    Diabetes Mother     Heart attack Father     Heart disease Father          Medications have been verified  Objective   /80   Pulse 70   Temp 97 9 °F (36 6 °C)   Resp 18   Ht 5' 10" (1 778 m)   Wt 119 kg (263 lb)   SpO2 96%   BMI 37 74 kg/m²   No LMP for male patient  Physical Exam     Physical Exam  Vitals signs reviewed  Constitutional:       General: He is not in acute distress  Appearance: He is well-developed  He is not diaphoretic  HENT:      Head: Normocephalic and atraumatic  Right Ear: Tympanic membrane and external ear normal  There is no impacted cerumen  Left Ear: Tympanic membrane and external ear normal  There is no impacted cerumen  Nose: Nose normal    Cardiovascular:      Rate and Rhythm: Normal rate and regular rhythm  Heart sounds: Normal heart sounds  No murmur  No friction rub  No gallop  Pulmonary:      Effort: Pulmonary effort is normal  No respiratory distress  Breath sounds: Normal breath sounds  No wheezing or rales  Chest:      Chest wall: No tenderness  Lymphadenopathy:      Cervical: No cervical adenopathy  Skin:     General: Skin is warm  Neurological:      Mental Status: He is alert     Psychiatric: Behavior: Behavior normal          Thought Content:  Thought content normal          Judgment: Judgment normal

## 2021-03-02 NOTE — PATIENT INSTRUCTIONS
Flonase over the counter  Saline nasal spray over the counter   Follow up with PCP in 3-5 days  Proceed to ER if symptoms worsen    Eustachian Tube Dysfunction   AMBULATORY CARE:   Eustachian tube dysfunction (ETD)  is a condition that prevents your eustachian tubes from opening properly  It can also cause them to become blocked  Eustachian tubes connect your middle ear to the back of your nose and throat  These tubes open and allow air to flow in and out when you sneeze, swallow, or yawn  Common signs and symptoms include the following:   · Fullness or pressure in your ears    · Muffled hearing, or a feeling you are hearing under water or have clogged ears    · Pain in one or both ears    · Ringing in your ears    · Popping, crackling, or clicking feeling in your ears    · Trouble keeping your balance    Call your doctor or otolaryngologist if:   · Your symptoms do not improve or get worse  · You have a fever  · You have any hearing loss  · You have questions or concerns about your condition or care  Treatment:  ETD may get better on its own without any treatment  If it continues, you may need any of the following:  · Swallow, yawn, or chew gum  to help open your eustachian tubes  Your healthcare provider may also recommend you blow with your mouth shut and your nostrils pinched closed  · Air pressure devices  push air into your nose and eustachian tubes to help relieve air pressure in your ear  · Treatment for allergies  such as decongestants, antihistamines, and nasal steroids may improve ETD  They may help decrease swelling of the eustachian tubes  · A myringotomy  is surgery to make a hole in your eardrum  The hole relieves pressure and lets fluid drain from your ear  A pressure equalizing (PE) tube may be used to keep the hole open and to help drain fluid  · Tuboplasty  is a procedure to widen your eustachian tubes      Follow up with your doctor or otolaryngologist as directed: Write down your questions so you remember to ask them during your visits  © Copyright [address] Information is for End User's use only and may not be sold, redistributed or otherwise used for commercial purposes  All illustrations and images included in CareNotes® are the copyrighted property of A D A M , Inc  or Martine Campos  The above information is an  only  It is not intended as medical advice for individual conditions or treatments  Talk to your doctor, nurse or pharmacist before following any medical regimen to see if it is safe and effective for you

## 2021-03-09 ENCOUNTER — OFFICE VISIT (OUTPATIENT)
Dept: NEPHROLOGY | Facility: CLINIC | Age: 66
End: 2021-03-09
Payer: MEDICARE

## 2021-03-09 VITALS
BODY MASS INDEX: 38.08 KG/M2 | DIASTOLIC BLOOD PRESSURE: 82 MMHG | SYSTOLIC BLOOD PRESSURE: 122 MMHG | HEIGHT: 70 IN | HEART RATE: 53 BPM | WEIGHT: 266 LBS | OXYGEN SATURATION: 98 % | TEMPERATURE: 97.3 F

## 2021-03-09 DIAGNOSIS — I12.9 HYPERTENSIVE CHRONIC KIDNEY DISEASE WITH STAGE 1 THROUGH STAGE 4 CHRONIC KIDNEY DISEASE, OR UNSPECIFIED CHRONIC KIDNEY DISEASE: ICD-10-CM

## 2021-03-09 DIAGNOSIS — I10 ESSENTIAL HYPERTENSION: ICD-10-CM

## 2021-03-09 DIAGNOSIS — Z79.4 TYPE 2 DIABETES MELLITUS WITH STAGE 4 CHRONIC KIDNEY DISEASE, WITH LONG-TERM CURRENT USE OF INSULIN (HCC): ICD-10-CM

## 2021-03-09 DIAGNOSIS — N18.4 STAGE 4 CHRONIC KIDNEY DISEASE (HCC): Primary | ICD-10-CM

## 2021-03-09 DIAGNOSIS — N25.81 SECONDARY HYPERPARATHYROIDISM OF RENAL ORIGIN (HCC): ICD-10-CM

## 2021-03-09 DIAGNOSIS — E55.9 VITAMIN D DEFICIENCY: ICD-10-CM

## 2021-03-09 DIAGNOSIS — E08.22 DIABETES DUE TO UNDRL COND W DIABETIC CHRONIC KIDNEY DISEASE (HCC): ICD-10-CM

## 2021-03-09 DIAGNOSIS — N18.4 TYPE 2 DIABETES MELLITUS WITH STAGE 4 CHRONIC KIDNEY DISEASE, WITH LONG-TERM CURRENT USE OF INSULIN (HCC): ICD-10-CM

## 2021-03-09 DIAGNOSIS — E11.22 TYPE 2 DIABETES MELLITUS WITH STAGE 4 CHRONIC KIDNEY DISEASE, WITH LONG-TERM CURRENT USE OF INSULIN (HCC): ICD-10-CM

## 2021-03-09 DIAGNOSIS — I25.10 CORONARY ARTERY DISEASE INVOLVING NATIVE CORONARY ARTERY WITHOUT ANGINA PECTORIS, UNSPECIFIED WHETHER NATIVE OR TRANSPLANTED HEART: ICD-10-CM

## 2021-03-09 PROCEDURE — 99214 OFFICE O/P EST MOD 30 MIN: CPT | Performed by: INTERNAL MEDICINE

## 2021-03-09 NOTE — ASSESSMENT & PLAN NOTE
Lab Results   Component Value Date    EGFR 27 03/16/2020    EGFR 25 01/31/2020    EGFR 33 06/16/2019    CREATININE 2 41 (H) 03/16/2020    CREATININE 2 53 (H) 01/31/2020    CREATININE 2 06 (H) 06/16/2019   Stable at this time

## 2021-03-09 NOTE — ASSESSMENT & PLAN NOTE
Lab Results   Component Value Date    EGFR 27 03/16/2020    EGFR 25 01/31/2020    EGFR 33 06/16/2019    CREATININE 2 41 (H) 03/16/2020    CREATININE 2 53 (H) 01/31/2020    CREATININE 2 06 (H) 06/16/2019   Kidney function demonstrates a  Baseline creatinine of 2 4-2 5 mg/dl  His GFR has stayed between 26-27 ml/mnin  He has had Kidney Smart Education and was educated in dialysis modalities  and transplant  He has a list of safe OTC medications  Will continue to monitor clsoely

## 2021-03-09 NOTE — PROGRESS NOTES
[address] [facility] MD    Name: Gayatri Gonzalez  YOB: 1955      Assessment/Plan:           Problem List Items Addressed This Visit        Endocrine    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 ([facility] [address] [address] )    Type 2 diabetes mellitus with diabetic chronic kidney disease ([facility] )       Lab Results   Component Value Date    HGBA1C 7 4 (H) 01/09/2021   Under endocrinology supervision            Cardiovascular and Mediastinum    Essential hypertension     Blood pressure is controlled  However, he has occasional end of day edema, most probably from the high dose amlodipine with contribution from pioglitazone  He elevates his legs and uses  Support stockings  He avoids salt         Coronary artery disease involving native coronary artery without angina pectoris       Genitourinary    Stage 4 chronic kidney disease ([facility] ) - Primary     Lab Results   Component Value Date    EGFR 27 03/16/2020    EGFR 25 01/31/2020    EGFR 33 06/16/2019    CREATININE 2 41 (H) 03/16/2020    CREATININE 2 53 (H) 01/31/2020    CREATININE 2 06 (H) 06/16/2019   Kidney function demonstrates a  Baseline creatinine of 2 4-2 5 mg/dl  His GFR has stayed between 26-27 ml/mnin  He has had Kidney Smart Education and was educated in dialysis modalities  and transplant  He has a list of safe OTC medications  Will continue to monitor clsoely         Relevant Orders    CBC and differential    Comprehensive metabolic panel    Microalbumin / creatinine urine ratio    Protein / creatinine ratio, urine    Uric acid    Urinalysis with microscopic    Vitamin D 25 hydroxy    Hypertensive chronic kidney disease with stage 1 through stage 4 chronic kidney disease, or unspecified chronic kidney disease     Lab Results   Component Value Date    EGFR 27 03/16/2020    EGFR 25 01/31/2020    EGFR 33 06/16/2019    CREATININE 2 41 (H) 03/16/2020    CREATININE 2 53 (H) 01/31/2020    CREATININE 2 06 (H) 06/16/2019   Stable at this time           Other Visit Diagnoses     Secondary hyperparathyroidism of renal origin ([address] )        Relevant Orders    PTH, intact    Vitamin D deficiency        Relevant Orders    PTH, intact    Vitamin D 25 hydroxy            Subjective:      Patient ID: Georges Khalil is a 77 y o  male  HPI [employer] has a history of CKD 4 due to hypertensive chronic kidney disease  He has acquired absence of a left kidney and has hyperlipidemia  He has diabetes with CKD and last A1c was 7%   mg/g  (was 214 mg/g)    Last GFR was 26 ml/min in Dec 2020  The following portions of the patient's history were reviewed and updated as appropriate: allergies, current medications, past family history, past medical history, past social history, past surgical history and problem list     Review of Systems   Constitutional: Negative for chills, fatigue and fever  HENT: Negative for hearing loss  Cardiovascular: Positive for leg swelling  Negative for chest pain and palpitations  Gastrointestinal: Negative for abdominal pain (chronic lower abd pain of unknown etiology and has had multiple tests)  Genitourinary: Negative for difficulty urinating, frequency, hematuria and urgency  Had a left nephrectomy 2018 for RCCA   Musculoskeletal: Positive for arthralgias and back pain  Neurological: Negative for dizziness and light-headedness  Psychiatric/Behavioral: Positive for dysphoric mood           Social History     Socioeconomic History    Marital status: /Civil Union     Spouse name: None    Number of children: None    Years of education: None    Highest education level: None   Occupational History    Occupation:     Social Needs    Financial resource strain: None    Food insecurity     Worry: None     Inability: None    Transportation needs     Medical: None     Non-medical: None   Tobacco Use    Smoking status: Never Smoker    Smokeless tobacco: Never Used   Substance and Sexual Activity    Alcohol use: No    Drug use: No    Sexual activity: None   Lifestyle    Physical activity     Days per week: None     Minutes per session: None    Stress: None   Relationships    Social connections     Talks on phone: None     Gets together: None     Attends Mandaen service: None     Active member of club or organization: None     Attends meetings of clubs or organizations: None     Relationship status: None    Intimate partner violence     Fear of current or ex partner: None     Emotionally abused: None     Physically abused: None     Forced sexual activity: None   Other Topics Concern    None   Social History Narrative    Patient is a former smoker - As per [employer]    Current drug user - As per [employer]    Consumes on average 1 cup of regular coffee per day      Past Medical History:   Diagnosis Date    Angina pectoris[facility] unspecified ([facility] )     Cancer of kidney ([facility] ) 2019    Chronic kidney disease, stage 2 (mild)     Chronic kidney disease, stage 3     Diabetes mellitus ([facility] )     Essential hypertension     Hypercholesterolemia     Hypertension     Nephrolithiasis     calcium oxalate - had lithotripsy     Seasonal allergies     Stage 3 chronic kidney disease 11/14/2019    Type 2 diabetes mellitus ([facility] )     Type II diabetes mellitus, uncontrolled ([facility] [facility] )      Past Surgical History:   Procedure Laterality Date    ARTHROSCOPY KNEE Bilateral     CARDIAC SURGERY      balloon procedure and stent placement     FOOT SURGERY Right     implants    LAMINECTOMY      NEPHRECTOMY Left 06/2019    PARTIAL HIP ARTHROPLASTY Right     ROTATOR CUFF REPAIR      SHOULDER ARTHROSCOPY Right     SHOULDER SURGERY Left     removal of bone spur       Current Outpatient Medications:     albuterol (VENTOLIN HFA) 90 mcg/act inhaler, Inhale 2 puffs every 4 (four) hours as needed, Disp: , Rfl:     amLODIPine (NORVASC) 10 mg tablet, , Disp: , Rfl:     aspirin 81 mg chewable tablet, Chew 81 mg, Disp: , Rfl:     atorvastatin (LIPITOR) 80 mg tablet, Take by mouth, Disp: , Rfl:     diclofenac sodium (VOLTAREN) 1 %, apply 2 grams to affected area four times a day, Disp: , Rfl:     fenofibrate (TRICOR) 54 MG tablet, Take 54 mg by mouth daily, Disp: , Rfl:     glucose blood (OneTouch Verio) test strip, Use to test blood sugar three times daily  , Disp: 200 each, Rfl: 3    hydrochlorothiazide (HYDRODIURIL) 25 mg tablet, , Disp: , Rfl:     insulin glargine (Toujeo SoloStar) 300 units/mL CONCETRATED U-300 injection pen (1-unit dial), Inject 60 units daily  , Disp: 12 pen, Rfl: 1    isosorbide mononitrate (IMDUR) 60 mg 24 hr tablet, Take 60 mg by mouth daily, Disp: , Rfl:     MAGNESIUM PO, Take 500 mg by mouth, Disp: , Rfl:     metoprolol tartrate (LOPRESSOR) 50 mg tablet, Take 50 mg by mouth every 12 (twelve) hours, Disp: , Rfl:     nateglinide (STARLIX) 120 mg tablet, Take 1 tablet (120 mg total) by mouth 3 (three) times a day before meals, Disp: 270 tablet, Rfl: 1    nitroglycerin (NITROSTAT) 0 4 mg SL tablet, , Disp: , Rfl:     OneTouch Delica Lancets 54Y MISC, Use to test blood sugar three times daily  , Disp: 100 each, Rfl: 3    pioglitazone (ACTOS) 15 mg tablet, Take 1 tablet (15 mg total) by mouth daily, Disp: 90 tablet, Rfl: 1    rivaroxaban (Xarelto) 2 5 mg tablet, Take 2 5 mg by mouth, Disp: , Rfl:     sertraline (ZOLOFT) 50 mg tablet, Take 1 tablet (50 mg total) by mouth daily, Disp: 90 tablet, Rfl: 1    traZODone (DESYREL) 100 mg tablet, , Disp: , Rfl:     gabapentin (NEURONTIN) 300 mg capsule, take 1 capsule by mouth three times a day (BEGIN WITH 1 CAPSULE A     (REFER TO PRESCRIPTION NOTES)  , Disp: , Rfl:     linaGLIPtin (Tradjenta) 5 MG TABS, Take 5 mg by mouth daily (Patient not taking: Reported on 3/9/2021), Disp: 90 mg, Rfl: 3    Lab Results   Component Value Date     06/19/2018    SODIUM 140 03/16/2020    K 3 7 03/16/2020     03/16/2020    CO2 30 03/16/2020    ANIONGAP 10 3 06/19/2018    AGAP 8 03/16/2020    BUN 30 (H) 03/16/2020    CREATININE 2 41 (H) 03/16/2020    GLUC 223 (H) 01/31/2020    GLUF 175 (H) 03/16/2020    CALCIUM 9 6 03/16/2020    AST 21 03/16/2020    ALT 25 03/16/2020    ALKPHOS 39 (L) 03/16/2020    PROT 6 6 04/11/2018    TP 7 6 03/16/2020    BILITOT 0 7 04/11/2018    TBILI 0 50 03/16/2020    EGFR 27 03/16/2020     Lab Results   Component Value Date    WBC 6 40 03/16/2020    HGB 13 6 (L) 03/16/2020    HCT 41 5 (L) 03/16/2020    MCV 80 (L) 03/16/2020     03/16/2020     Lab Results   Component Value Date    CHOLESTEROL 134 03/16/2020    CHOLESTEROL 131 05/06/2019    CHOLESTEROL 151 07/31/2018     Lab Results   Component Value Date    HDL 35 (L) 03/16/2020    HDL 40 05/06/2019    HDL 47 07/31/2018     Lab Results   Component Value Date    LDLCALC 72 03/16/2020    LDLCALC 73 05/06/2019    LDLCALC 87 07/31/2018     Lab Results   Component Value Date    TRIG 136 03/16/2020    TRIG 88 05/06/2019    TRIG 85 07/31/2018     No results found for: [place]  Lab Results   Component Value Date    JBO0JYZJTSZO 2 740 05/06/2019     Lab Results   Component Value Date    CALCIUM 9 6 03/16/2020    PHOS 2 5 (L) 04/11/2018     No results found for: SPEP, UPEP  No results found for: [place]  CT abdomen 9-21-20 demonstrates left nephrectomy with normal surgical bed  Mild atrophy of right kidney  12-20-21 creat 2 49 with GFR 26 ml/min  LDL 57       Objective:      /82 (BP Location: Left arm, Patient Position: Sitting, Cuff Size: Large)   Pulse (!) 53   Temp (!) 97 3 °F (36 3 °C) (Temporal)   Ht 5' 10" (1 778 m)   Wt 121 kg (266 lb)   SpO2 98%   BMI 38 17 kg/m²          Physical Exam  Constitutional:       Appearance: He is obese  HENT:      Head: Normocephalic and atraumatic  Right Ear: External ear normal       Left Ear: External ear normal    Eyes:      Extraocular Movements: Extraocular movements intact        Pupils: Pupils are equal, round, and reactive to light  Cardiovascular:      Rate and Rhythm: Normal rate and regular rhythm  Pulmonary:      Effort: Pulmonary effort is normal  No respiratory distress  Breath sounds: No wheezing or rales  Abdominal:      General: Bowel sounds are normal  There is no distension  Tenderness: There is no abdominal tenderness  There is no guarding  Musculoskeletal: Normal range of motion  Right lower leg: No edema  Left lower leg: No edema  Skin:     General: Skin is warm and dry  Neurological:      General: No focal deficit present  Mental Status: He is alert and oriented to person, place, and time  Mental status is at baseline  Psychiatric:         Mood and Affect: Mood normal          Behavior: Behavior normal          Thought Content:  Thought content normal          Judgment: Judgment normal

## 2021-03-09 NOTE — ASSESSMENT & PLAN NOTE
Blood pressure is controlled  However, he has occasional end of day edema, most probably from the high dose amlodipine with contribution from pioglitazone  He elevates his legs and uses  Support stockings  He avoids salt

## 2021-03-15 LAB
CREAT ?TM UR-SCNC: 126 UMOL/L
EXT MICROALBUMIN URINE RANDOM: 48
EXT PROTEIN URINE: 77.9
MICROALBUMIN/CREAT UR: 381 MG/G{CREAT}

## 2021-03-31 ENCOUNTER — TELEPHONE (OUTPATIENT)
Dept: ENDOCRINOLOGY | Facility: CLINIC | Age: 66
End: 2021-03-31

## 2021-03-31 ENCOUNTER — OFFICE VISIT (OUTPATIENT)
Dept: DIABETES SERVICES | Facility: CLINIC | Age: 66
End: 2021-03-31

## 2021-03-31 VITALS — WEIGHT: 266 LBS | HEIGHT: 70 IN | BODY MASS INDEX: 38.08 KG/M2

## 2021-03-31 DIAGNOSIS — E08.22 DIABETES DUE TO UNDRL COND W DIABETIC CHRONIC KIDNEY DISEASE (HCC): Primary | ICD-10-CM

## 2021-03-31 NOTE — PROGRESS NOTES
Medical Nutrition Therapy      Assessment    Chief complaint Type 2 diabetes, reports significant L LE swelling with benefit from wearing support stockings, however he doesn't like to wear them all the time  He has been unable to use his new meter because [employer] reportedly will not cover test strips  He seemed exceedingly frustrated with many medical issues today  Visit Type: Follow-up visit    HPI: [place] returned for follow-up today  Carl food record reveals that his wife writes down what he eats during the day but she advised that he likely eats after she goes to bed and those details were not noted  His meals do provide fruit, vegetables, and whole grains  They are trying to limit sodium in some respects, but at times he also consumes canned soup and processed meat which are very high in sodium  His diet is very inconsistent and still provides added sugar in the form of regular soda, cake, and ice cream, albeit not daily  The food record provided information about 12 days but carbs not recorded  Based on my calculations, overall, Carl meals provide + grams carbohydrate  Based on meal plan review and discussion about fluid intake, meter coverage  provided education about importance of limiting sugared foods, sugar sweetened drinks, and reducing sodium intake  I will see if I can help him determine which meter is covered by his insurance  Did write out which meters we have and asked them to call his insurance carrier to determine same  We discussed his fluid needs  Given his cardiac and renal involvement, would advise either of these specialists to provide him with a fluid recommendation  For now, asked [place] to consume not more than 32 ounces of fluids daily and explained that this includes all beverages and liquid foods like soup and jello      Ht Readings from Last 1 Encounters:   03/31/21 5' 10" (1 778 m)     Wt Readings from Last 2 Encounters:   03/31/21 121 kg (266 lb)   03/09/21 121 kg (266 lb)     Weight Change: No    Medical Diagnosis/reason for visit E08 22    Food Log:    Please see scanned 3 day food diary    Beverages: mostly water, milk, occasional regular soda  Eating out/Take out:rare    Exercise starting to do some yard work, can walk in neighborhood    Calorie needs 1715 kcals/day Carbs: 45-60 g/meal, 15-20 g/snack     Nutrition Diagnosis:  Inconsistent carbohydrate intake  intake related to Physiological causes requiring careful timing and consistency in the amount of carbohydrate (i e  diabetes mellitus, hypoglycemia) as evidenced by  Estimated carbohydrate intake that is different from recommended types or ingested on an irregular basis    Intervention: carbohydrate counting and reduced sodium intake     Treatment Goals: Patient will count carbohydrates and choose fewer high sodium foods    Monitoring and evaluation:    Term code indicator  FH 1 6 3 Carbohydrate Intake Criteria: 45-60 per meal  Term code indicator  FH 1 7 2 Mineral/Element Intake Criteria: Low sodium diet    Patients Response to Instruction:  Jackie Posada  Expected Compliancefair    Thank you for coming to the [facility] for education today  Please feel free to call with any questions or concerns      Bruno Meyer  [address] [facility] [id number] [phone]

## 2021-03-31 NOTE — PATIENT INSTRUCTIONS
1  Ask Dr Marco Pacheco or cardiologist if 2 quart fluid allowance is okay for you   Water, coffee, lemonade, juice, milk, diet jello all count  2  Eat non-starchy vegetables when you are hungry between meals  3  Continue to aim for 45-60 g carb per meal  4  If you have juice or soda, only 4 ounces at a time, dilute it if you can  5  Increase physical activity as tolerated  6   Find out about preferred meter

## 2021-03-31 NOTE — TELEPHONE ENCOUNTER
PT here to see dietician today   Log sheet given at this visit    Blood sugar log    Medications:  toujeo 60 units  tradjenta 5mg daily  starlix 120 mg 3 times a day prior to meals  actos 15 mg daily

## 2021-04-02 NOTE — TELEPHONE ENCOUNTER
I would recommend increasing Toujeo to 65 units daily  Continue to work on diet and we will see have those efforts impact his blood sugar

## 2021-04-08 ENCOUNTER — OFFICE VISIT (OUTPATIENT)
Dept: ENDOCRINOLOGY | Facility: CLINIC | Age: 66
End: 2021-04-08
Payer: MEDICARE

## 2021-04-08 VITALS
WEIGHT: 266 LBS | BODY MASS INDEX: 38.08 KG/M2 | HEIGHT: 70 IN | HEART RATE: 55 BPM | RESPIRATION RATE: 20 BRPM | DIASTOLIC BLOOD PRESSURE: 80 MMHG | OXYGEN SATURATION: 98 % | SYSTOLIC BLOOD PRESSURE: 126 MMHG | TEMPERATURE: 96.8 F

## 2021-04-08 DIAGNOSIS — E78.00 PURE HYPERCHOLESTEROLEMIA: ICD-10-CM

## 2021-04-08 DIAGNOSIS — E08.22 DIABETES DUE TO UNDRL COND W DIABETIC CHRONIC KIDNEY DISEASE (HCC): ICD-10-CM

## 2021-04-08 DIAGNOSIS — I10 ESSENTIAL HYPERTENSION: Primary | ICD-10-CM

## 2021-04-08 PROCEDURE — 99214 OFFICE O/P EST MOD 30 MIN: CPT | Performed by: NURSE PRACTITIONER

## 2021-04-08 RX ORDER — BLOOD SUGAR DIAGNOSTIC
STRIP MISCELLANEOUS
Qty: 200 EACH | Refills: 3 | Status: SHIPPED | OUTPATIENT
Start: 2021-04-08

## 2021-04-08 RX ORDER — LANCETS 33 GAUGE
EACH MISCELLANEOUS
Qty: 200 EACH | Refills: 3 | Status: SHIPPED | OUTPATIENT
Start: 2021-04-08

## 2021-04-08 NOTE — PROGRESS NOTES
Established Patient Progress Note      Chief Complaint   Patient presents with    Diabetes Type 2        History of Present Illness:   Celina Suero is a 74 y o  male with   Hypertension, hyperlipidemia, coronary artery disease, chronic kidney disease stage 4 status post left nephrectomy for renal cell cancer in 2017, and type 2 diabetes presents to the office today for follow-up  Denies recent illness or hospitalizations  Denies recent severe hypoglycemic or severe hyperglycemic episodes  Denies any issues with his current regimen  home glucose monitoring: are performed regularly, once daily    Since patient's last appointment on 01/08/2021, he has met with the registered dietitian to discuss medical nutrition therapy  He states that his diet has improved and he is trying to increase his physical activity  Patient did recently go to the emergency department on 03/23/2021 for chest pain  There were no significant findings  He reports that symptoms have improved  He denies polydipsia, polyuria, and changes to vision  Last HgA1C from 3/23/21 elevated at 8 1%  The patient did not bring a sugar log or his glucometer to today's appointment  Current regimen: Toujeo U-300 65 units daily  Linaglitin 5 mg daily  Nateglinide 120 TID  Actos 15 mg daily    Last Eye Exam: 11/2020  Last Foot Exam:   Patient has an upcoming appointment with a chiropractor/ podiatrist to discuss increasing neuropathy in bilateral feet  For his hypertension, he is taking amlodipine 10 mg, hydrochlorothiazide 25 mg, Imdur 60 mg, and metoprolol tartrate 50 mg twice daily  He denies headache, pedal edema, and orthostatic hypotension  For his hyperlipidemia, he takes 80 mg of atorvastatin daily  For his chronic kidney disease, he is following with Nephrology      Patient Active Problem List   Diagnosis    Acquired absence of kidney    Pure hypercholesterolemia    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    Essential hypertension    SOB (shortness of breath)    Hearing loss due to cerumen impaction, bilateral    Stage 4 chronic [facility] disease [facility]    Hypertensive chronic kidney disease with stage 1 through stage 4 chronic kidney disease, or unspecified chronic kidney disease    Coronary artery disease involving native coronary artery without angina pectoris    Degeneration of lumbosacral intervertebral disc    Lumbar radiculopathy    Nephrolithiasis    Type 2 diabetes mellitus with diabetic chronic kidney disease (HCC)      Past Medical History:   Diagnosis Date    Angina pectoris, unspecified ([place] [address] )     Cancer of kidney ([place] [address] ) 2019    Chronic kidney disease, stage 2 (mild)     Chronic kidney disease, stage 3     Diabetes mellitus ([place] [address] )     Essential hypertension     Hypercholesterolemia     Hypertension     Nephrolithiasis     calcium oxalate - had lithotripsy     Seasonal allergies     Stage 3 chronic kidney disease 11/14/2019    Type 2 diabetes mellitus (HCC)     Type II diabetes mellitus, uncontrolled ([facility])       Past Surgical History:   Procedure Laterality Date    ARTHROSCOPY KNEE Bilateral     CARDIAC SURGERY      balloon procedure and stent placement     FOOT SURGERY Right     implants    LAMINECTOMY      NEPHRECTOMY Left 06/2019    PARTIAL HIP ARTHROPLASTY Right     ROTATOR CUFF REPAIR      SHOULDER ARTHROSCOPY Right     SHOULDER SURGERY Left     removal of bone spur      Family History   Problem Relation Age of Onset    Diabetes Mother     Heart attack Father     Heart disease Father      Social History     Tobacco Use    Smoking status: Never Smoker    Smokeless tobacco: Never Used   Substance Use Topics    Alcohol use: No     Allergies   Allergen Reactions    Percocet [Oxycodone-Acetaminophen]     Tetracycline          Current Outpatient Medications:     albuterol (VENTOLIN HFA) 90 mcg/act inhaler, Inhale 2 puffs every 4 (four) hours as needed, Disp: , Rfl:   amLODIPine (NORVASC) 10 mg tablet, , Disp: , Rfl:     aspirin 81 mg chewable tablet, Chew 81 mg, Disp: , Rfl:     atorvastatin (LIPITOR) 80 mg tablet, Take by mouth, Disp: , Rfl:     diclofenac sodium (VOLTAREN) 1 %, apply 2 grams to affected area four times a day, Disp: , Rfl:     fenofibrate (TRICOR) 54 MG tablet, Take 54 mg by mouth daily, Disp: , Rfl:     glucose blood (OneTouch Verio) test strip, Use to test blood sugar three times daily  , Disp: 200 each, Rfl: 3    hydrochlorothiazide (HYDRODIURIL) 25 mg tablet, , Disp: , Rfl:     hydrocortisone 2 5 % cream, as needed, Disp: , Rfl:     insulin glargine (Toujeo SoloStar) 300 units/mL CONCETRATED U-300 injection pen (1-unit dial), Inject 60 units daily  (Patient taking differently: 65 Units Inject 60 units daily ), Disp: 12 pen, Rfl: 1    isosorbide mononitrate (IMDUR) 60 mg 24 hr tablet, Take 60 mg by mouth daily, Disp: , Rfl:     linaGLIPtin (Tradjenta) 5 MG TABS, Take 5 mg by mouth daily, Disp: 90 mg, Rfl: 3    MAGNESIUM PO, Take 500 mg by mouth, Disp: , Rfl:     metoprolol tartrate (LOPRESSOR) 50 mg tablet, Take 50 mg by mouth every 12 (twelve) hours, Disp: , Rfl:     nateglinide (STARLIX) 120 mg tablet, Take 1 tablet (120 mg total) by mouth 3 (three) times a day before meals, Disp: 270 tablet, Rfl: 1    nitroglycerin (NITROSTAT) 0 4 mg SL tablet, , Disp: , Rfl:     OneTouch Delica Lancets 00T MISC, Use to test blood sugar three times daily  , Disp: 200 each, Rfl: 3    pioglitazone (ACTOS) 15 mg tablet, Take 1 tablet (15 mg total) by mouth daily, Disp: 90 tablet, Rfl: 1    rivaroxaban (Xarelto) 2 5 mg tablet, Take 2 5 mg by mouth, Disp: , Rfl:     sertraline (ZOLOFT) 50 mg tablet, Take 1 tablet (50 mg total) by mouth daily, Disp: 90 tablet, Rfl: 1    traZODone (DESYREL) 100 mg tablet, , Disp: , Rfl:     Review of Systems   Constitutional: Positive for fatigue  Negative for activity change, appetite change and unexpected weight change  HENT: Negative for dental problem  Eyes: Negative for visual disturbance  Respiratory: Negative for cough, chest tightness and shortness of breath  Cardiovascular: Negative for chest pain, palpitations and leg swelling  Gastrointestinal: Negative for constipation, diarrhea, nausea and vomiting  Endocrine: Negative for polydipsia, polyphagia and polyuria  Genitourinary: Negative for frequency  Musculoskeletal: Positive for arthralgias  Negative for back pain, gait problem, joint swelling and myalgias  Skin: Negative for wound  Allergic/Immunologic: Positive for environmental allergies  Neurological: Positive for numbness  Negative for dizziness, weakness, light-headedness and headaches  Hematological: Bruises/bleeds easily  Psychiatric/Behavioral: Negative for decreased concentration, dysphoric mood and sleep disturbance  The patient is not nervous/anxious  Physical Exam:  Body mass index is 38 17 kg/m²  /80 (BP Location: Left arm, Patient Position: Sitting, Cuff Size: Adult)   Pulse 55   Temp (!) 96 8 °F (36 °C) (Tympanic)   Resp 20   Ht 5' 10" (1 778 m)   Wt 121 kg (266 lb)   SpO2 98%   BMI 38 17 kg/m²    Wt Readings from Last 3 Encounters:   04/08/21 121 kg (266 lb)   03/31/21 121 kg (266 lb)   03/09/21 121 kg (266 lb)       Physical Exam  Vitals signs reviewed  Constitutional:       General: He is not in acute distress  Appearance: He is well-developed  He is obese  He is not ill-appearing  HENT:      Head: Normocephalic and atraumatic  Comments: Mask in place  Eyes:      Pupils: Pupils are equal, round, and reactive to light  Neck:      Musculoskeletal: Normal range of motion and neck supple  Thyroid: No thyromegaly  Cardiovascular:      Rate and Rhythm: Normal rate and regular rhythm  Pulses: Normal pulses  Heart sounds: Normal heart sounds     Pulmonary:      Effort: Pulmonary effort is normal       Breath sounds: Normal breath sounds  Abdominal:      General: Bowel sounds are normal  There is no distension  Palpations: Abdomen is soft  Tenderness: There is no abdominal tenderness  Musculoskeletal:      Left lower leg: No edema  Lymphadenopathy:      Cervical: No cervical adenopathy  Skin:     General: Skin is warm and dry  Capillary Refill: Capillary refill takes less than 2 seconds  Neurological:      Mental Status: He is alert and oriented to person, place, and time  Gait: Gait normal    Psychiatric:         Mood and Affect: Mood normal          Behavior: Behavior normal          Labs:   Lab Results   Component Value Date    HGBA1C 8 1 (H) 03/23/2021    HGBA1C 7 4 (H) 01/09/2021    HGBA1C 11 3 (H) 02/13/2020     Lab Results   Component Value Date    CREATININE 2 41 (H) 03/16/2020    CREATININE 2 53 (H) 01/31/2020    CREATININE 2 06 (H) 06/16/2019    BUN 30 (H) 03/16/2020     06/19/2018    K 3 7 03/16/2020     03/16/2020    CO2 30 03/16/2020     eGFR   Date Value Ref Range Status   03/16/2020 27 ml/min/1 73sq m Final     Lab Results   Component Value Date    CHOL 140 05/15/2018    HDL 35 (L) 03/16/2020    TRIG 136 03/16/2020     Lab Results   Component Value Date    ALT 25 03/16/2020    AST 21 03/16/2020    ALKPHOS 39 (L) 03/16/2020    BILITOT 0 7 04/11/2018     Lab Results   Component Value Date    YOD0NCTGLNBQ 2 740 05/06/2019     No results found for: FREET4, TSI    Impression & Plan:    Problem List Items Addressed This Visit        Endocrine    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 ([facility] )       Patient is above goal of less than 7%  Recommend discontinuing Tradjenta and starting Ozempic  Patient and his wife for agreeable to this plan however, they recently received a 3 month supply of the Tradjenta and would like to finish this before transitioning to another medication    They also would like time to discuss this with their insurance company to make sure that this is financially viable  Encouraged patient to continue to focus on a healthy diet and increase physical activity  Recommended that patient tests his blood sugar twice daily and provide blood sugar logs for review  Lab Results   Component Value Date    HGBA1C 8 1 (H) 03/23/2021            Relevant Medications    glucose blood (OneTouch Verio) test strip    OneTouch Delica Lancets 89S MISC       Cardiovascular and Mediastinum    Essential hypertension - Primary       BP stable 126/80  Continue current regimen  Other    Pure hypercholesterolemia       Continue statin  No orders of the defined types were placed in this encounter  Patient Instructions   1  Ozempic (semaglutide) is the medication I would replace the tradjenta with  Discussed with the patient and all questioned fully answered  He will call me if any problems arise  Follow-up appointment in 3 months       Counseled patient on diagnostic results, prognosis, risk and benefit of treatment options, instruction for management, importance of treatment compliance, Risk  factor reduction and impressions    TORREY Oliva

## 2021-04-08 NOTE — ASSESSMENT & PLAN NOTE
Patient is above goal of less than 7%  Recommend discontinuing Tradjenta and starting Ozempic  Patient and his wife for agreeable to this plan however, they recently received a 3 month supply of the Tradjenta and would like to finish this before transitioning to another medication  They also would like time to discuss this with their insurance company to make sure that this is financially viable  Encouraged patient to continue to focus on a healthy diet and increase physical activity  Recommended that patient tests his blood sugar twice daily and provide blood sugar logs for review    Lab Results   Component Value Date    HGBA1C 8 1 (H) 03/23/2021

## 2021-06-11 LAB
CREAT ?TM UR-SCNC: 135 UMOL/L
EXT MICROALBUMIN URINE RANDOM: 60
MICROALBUMIN/CREAT UR: 444.4 MG/G{CREAT}

## 2021-07-01 ENCOUNTER — OFFICE VISIT (OUTPATIENT)
Dept: NEPHROLOGY | Facility: CLINIC | Age: 66
End: 2021-07-01
Payer: MEDICARE

## 2021-07-01 VITALS
SYSTOLIC BLOOD PRESSURE: 132 MMHG | HEIGHT: 70 IN | BODY MASS INDEX: 37.8 KG/M2 | WEIGHT: 264 LBS | DIASTOLIC BLOOD PRESSURE: 74 MMHG | HEART RATE: 60 BPM | OXYGEN SATURATION: 99 %

## 2021-07-01 DIAGNOSIS — E11.22 TYPE 2 DIABETES MELLITUS WITH STAGE 4 CHRONIC KIDNEY DISEASE, WITH LONG-TERM CURRENT USE OF INSULIN (HCC): ICD-10-CM

## 2021-07-01 DIAGNOSIS — M89.9 CHRONIC KIDNEY DISEASE-MINERAL AND BONE DISORDER: ICD-10-CM

## 2021-07-01 DIAGNOSIS — E66.01 SEVERE OBESITY (BMI >= 40) (HCC): ICD-10-CM

## 2021-07-01 DIAGNOSIS — E78.00 PURE HYPERCHOLESTEROLEMIA: ICD-10-CM

## 2021-07-01 DIAGNOSIS — Z79.4 TYPE 2 DIABETES MELLITUS WITH STAGE 4 CHRONIC KIDNEY DISEASE, WITH LONG-TERM CURRENT USE OF INSULIN (HCC): ICD-10-CM

## 2021-07-01 DIAGNOSIS — N20.0 NEPHROLITHIASIS: ICD-10-CM

## 2021-07-01 DIAGNOSIS — N18.4 STAGE 4 CHRONIC KIDNEY DISEASE (HCC): Primary | ICD-10-CM

## 2021-07-01 DIAGNOSIS — I12.9 HYPERTENSIVE CHRONIC KIDNEY DISEASE WITH STAGE 1 THROUGH STAGE 4 CHRONIC KIDNEY DISEASE, OR UNSPECIFIED CHRONIC KIDNEY DISEASE: ICD-10-CM

## 2021-07-01 DIAGNOSIS — E83.9 CHRONIC KIDNEY DISEASE-MINERAL AND BONE DISORDER: ICD-10-CM

## 2021-07-01 DIAGNOSIS — Z90.5 ACQUIRED ABSENCE OF KIDNEY: ICD-10-CM

## 2021-07-01 DIAGNOSIS — E79.0 ELEVATED URIC ACID IN BLOOD: ICD-10-CM

## 2021-07-01 DIAGNOSIS — N18.4 TYPE 2 DIABETES MELLITUS WITH STAGE 4 CHRONIC KIDNEY DISEASE, WITH LONG-TERM CURRENT USE OF INSULIN (HCC): ICD-10-CM

## 2021-07-01 DIAGNOSIS — E55.9 VITAMIN D DEFICIENCY: ICD-10-CM

## 2021-07-01 DIAGNOSIS — N18.9 CHRONIC KIDNEY DISEASE-MINERAL AND BONE DISORDER: ICD-10-CM

## 2021-07-01 PROCEDURE — 99214 OFFICE O/P EST MOD 30 MIN: CPT | Performed by: PHYSICIAN ASSISTANT

## 2021-07-01 RX ORDER — MELATONIN
1000 DAILY
Qty: 90 TABLET | Refills: 1 | Status: SHIPPED | OUTPATIENT
Start: 2021-07-01 | End: 2021-07-01 | Stop reason: SDUPTHER

## 2021-07-01 RX ORDER — MELATONIN
1000 DAILY
Qty: 90 TABLET | Refills: 1 | Status: SHIPPED | OUTPATIENT
Start: 2021-07-01 | End: 2021-12-27 | Stop reason: SDUPTHER

## 2021-07-01 RX ORDER — MELATONIN
1000 DAILY
Qty: 90 TABLET | Refills: 1 | Status: SHIPPED | OUTPATIENT
Start: 2021-07-01 | End: 2021-07-01

## 2021-07-01 NOTE — ASSESSMENT & PLAN NOTE
· Blood pressure is 132/74 with a heart rate of 60  · He reports home blood pressures that range from systolics of 351-713 to diastolics of 54-14  · Continue amlodipine 10 mg daily, hydrochlorothiazide 25 mg daily, isosorbide mononitrate 60 mg daily, metoprolol tartrate 50 mg every 12 hours

## 2021-07-01 NOTE — PATIENT INSTRUCTIONS
Start Vitamin D3 1,000 units daily  Contniue diabetic and low sodium diet  Please avoid NSAIDs (nonsteroidal anti-inflammatory drugs), such as Advil (ibuprofen), Aleve (naproxen), Naprosyn, BCs, Goody's powder  Tylenol (acetaminophen) is a safer option for the kidneys  Do not exceed the maximum dose of acetaminophen  Note that this may be in combination with other drugs NSAID medications  Please avoid herbal supplements, such as turmeric  Please consult your nephrologist before taking any over-the-counter medications

## 2021-07-01 NOTE — ASSESSMENT & PLAN NOTE
· Uric acid is mildly elevated at 7 2 on 06/11/2021  · Patient has a history of gout flares   Treated with colchicine but no recent flares  His for current foot pain is not consistent with gout  · We discussed that he may wish to consider addition of allopurinol if he develops any further gout flares  · Recommended low purine diet, reduction of high fructose corn syrup intake

## 2021-07-01 NOTE — PROGRESS NOTES
Assessment & Plan:    1  Stage 4 chronic kidney disease [facility])  Assessment & Plan:  Lab Results   Component Value Date    EGFR 27 03/16/2020    EGFR 25 01/31/2020    EGFR 33 06/16/2019    CREATININE 2 41 (H) 03/16/2020    CREATININE 2 53 (H) 01/31/2020    CREATININE 2 06 (H) 06/16/2019     · Renal function remained stable with a BUN of 34 mg/dL with a creatinine of 2 26 mg/dL estimated GFR of 24 mL/min with no electrolyte abnormalities on 06/11/2021  ·   · Urinalysis reveals specific gravity 1 015, proteinuria and glucosuria and is otherwise bland on 06/11/2021  ·   · Protein to creatinine ratio of 86 4 mg/dL with Microalbumin to creatinine ratio of 444 4 milligram/gram creatinine on 06/11/2021  He is not on a RAAS inhibitor  He does not want another pill  Orders:  -     Basic metabolic panel; Future  -     Protein / creatinine ratio, urine  -     PTH, intact; Future  -     cholecalciferol (VITAMIN D3) 1,000 units tablet; Take 1 tablet (1,000 Units total) by mouth daily  -     Urinalysis with microscopic  -     Vitamin D 25 hydroxy; Future  -     Microalbumin / creatinine urine ratio    2  Hypertensive chronic kidney disease with stage 1 through stage 4 chronic kidney disease, or unspecified chronic kidney disease  Assessment & Plan:  · Blood pressure is 132/74 with a heart rate of 60  · He reports home blood pressures that range from systolics of 395-139 to diastolics of 84-74  · Continue amlodipine 10 mg daily, hydrochlorothiazide 25 mg daily, isosorbide mononitrate 60 mg daily, metoprolol tartrate 50 mg every 12 hours  3  Type 2 diabetes mellitus with stage 4 chronic kidney disease, with long-term current use of insulin ([facility])  Assessment & Plan:    Lab Results   Component Value Date    HGBA1C 8 1 (H) 03/23/2021   · He reports blood glucose around 130s when he checks, but he does not wish to continue close monitoring  · Followed by Endocrinology        4  Nephrolithiasis  Assessment & Plan:  · Quiescent      5  Acquired absence of kidney    6  Chronic kidney disease-mineral and bone disorder  Assessment & Plan:  · PTH is appropriate for renal function at 47 4 pg/milliliter with a low 25 hydroxy vitamin-D of 29 ng/mL on 06/11/2021  · Will start Vitamin D3 1,000 units daily  Orders:  -     PTH, intact; Future  -     Vitamin D 25 hydroxy; Future    7  Pure hypercholesterolemia  Assessment & Plan:  · Total cholesterol is well controlled at 133 mg/dL with an HDL of 39 mg/dL and an LDL of 77 mg/dL with triglycerides 83 mg/dL on 06/11/2021  · Continue atorvastatin 80 mg daily and fenofibrate 54 mg daily  8  Vitamin D deficiency  Assessment & Plan:  · 25 hydroxy vitamin-D is low at 29 ng/mL on 06/11/2021  · Start Vitamin D3 1,000 units daily  Orders:  -     Vitamin D 25 hydroxy; Future    9  Severe obesity (BMI >= 40) ([facility])    10  Elevated uric acid in blood  Assessment & Plan:  · Uric acid is mildly elevated at 7 2 on 06/11/2021  · Patient has a history of gout flares   Treated with colchicine but no recent flares  His for current foot pain is not consistent with gout  · We discussed that he may wish to consider addition of allopurinol if he develops any further gout flares  · Recommended low purine diet, reduction of high fructose corn syrup intake  The benefits, risks and alternatives to the treatment plan were discussed at this visit  Patient was advised of common adverse effects of any medical therapies prescribed  All questions were answered and discussed with the patient and any accompanying family members or caretakers  Subjective:      Patient ID: Morris Sotelo is a 74 y o  male seen at the [place] office  He is followed by Dr Thom Vick for chronic kidney disease, stage IV status post left nephrectomy in 2018 for RCCA and hypertension  Medical history is also significant for calcium oxalate nephrolithiasis status post lithotripsy, DMT2, HLD      He was last seen on 03/09/2021 by Dr Elie Chun, at which time renal function was stable around baseline creatinine 2 5  Patient had undergone kidney smart and dialysis modalities education  No medication changes were made  In the interim, he has been followed by diabetes dietitian on 03/31/2021, Endocrinology on 04/21 with recent increase of Toujeo to 65 units and discontinuation of Dorothyann Clock in favor of Ozempic when they utilize the Tradjenta supply and ENT on 06/01/2021 for bilateral cerumen impaction removal     HPI    Today, patient presents For routine follow-up  He complains of pain in his right foot that he describes as numbness and tingling  He has been seen a chiropractor for treatment of neuropathic type pain  He does not take pain pills and knows to avoid NSAIDs  Uric acid is 7 2 mg/dL on 06/11/2021  He does report past gout flares that manifested as severe pain and swelling in his 1st MCP joint  This pain is different  He is not on gout prophylaxis  Blood pressure is  132/74 with a heart rate of 60  He reports home blood pressures that range from systolics of 521-920 to diastolics of 88-22  He reports adherence with amlodipine 10 mg daily, hydrochlorothiazide 25 mg daily, isosorbide mononitrate 60 mg daily, metoprolol tartrate 50 mg every 12 hours  The following portions of the patient's history were reviewed and updated as appropriate: allergies, current medications, past family history, past medical history, past social history, past surgical history, and problem list     Review of Systems   Constitutional: Negative for chills and fever  HENT: Negative for ear pain and sore throat  Eyes: Negative for pain and visual disturbance  Respiratory: Negative for cough and shortness of breath  Cardiovascular: Negative for chest pain and palpitations  Gastrointestinal: Negative for abdominal pain, blood in stool, constipation, diarrhea and vomiting  Genitourinary: Negative for dysuria and hematuria  Musculoskeletal: Negative for arthralgias and back pain  Skin: Negative for color change and rash  Neurological: Positive for numbness (and tingling of R foot)  Negative for seizures and syncope  Hematological: Does not bruise/bleed easily  Psychiatric/Behavioral: Negative for confusion and hallucinations  All other systems reviewed and are negative  Objective:      /74   Pulse 60   Ht 5' 10" (1 778 m)   Wt 120 kg (264 lb)   SpO2 99%   BMI 37 88 kg/m²          Physical Exam  Vitals and nursing note reviewed  Constitutional:       General: He is awake  He is not in acute distress  Appearance: Normal appearance  He is well-developed  He is obese  He is not ill-appearing, toxic-appearing or diaphoretic  HENT:      Head: Normocephalic and atraumatic  Jaw: There is normal jaw occlusion  Nose: Nose normal       Mouth/Throat:      Mouth: Mucous membranes are moist       Pharynx: Oropharynx is clear  No oropharyngeal exudate or posterior oropharyngeal erythema  Eyes:      General: Lids are normal  Vision grossly intact  Gaze aligned appropriately  No scleral icterus  Right eye: No discharge  Left eye: No discharge  Extraocular Movements: Extraocular movements intact  Conjunctiva/sclera: Conjunctivae normal       Pupils: Pupils are equal, round, and reactive to light  Neck:      Thyroid: No thyroid mass or thyromegaly  Trachea: Trachea and phonation normal    Cardiovascular:      Rate and Rhythm: Normal rate and regular rhythm  Heart sounds: Normal heart sounds, S1 normal and S2 normal  No murmur heard  No friction rub  No gallop  Pulmonary:      Effort: Pulmonary effort is normal  No respiratory distress  Breath sounds: Normal breath sounds  No stridor  No wheezing, rhonchi or rales  Abdominal:      General: Abdomen is flat  Bowel sounds are normal  There is no distension  Palpations: Abdomen is soft  There is no mass  Tenderness: There is no abdominal tenderness  There is no guarding  Hernia: No hernia is present  Musculoskeletal:         General: Normal range of motion  Cervical back: Normal range of motion and neck supple  No rigidity or tenderness  Right lower leg: No edema  Left lower leg: No edema  Lymphadenopathy:      Cervical: No cervical adenopathy  Skin:     General: Skin is warm and dry  Coloration: Skin is not jaundiced  Findings: No bruising  Nails: There is no clubbing  Neurological:      General: No focal deficit present  Mental Status: He is alert and oriented to person, place, and time  Mental status is at baseline  Psychiatric:         Attention and Perception: Attention and perception normal          Mood and Affect: Mood and affect normal          Speech: Speech normal          Behavior: Behavior normal  Behavior is cooperative  Thought Content:  Thought content normal          Judgment: Judgment normal              Lab Results   Component Value Date     06/19/2018    SODIUM 140 03/16/2020    K 3 7 03/16/2020     03/16/2020    CO2 30 03/16/2020    ANIONGAP 10 3 06/19/2018    AGAP 8 03/16/2020    BUN 30 (H) 03/16/2020    CREATININE 2 41 (H) 03/16/2020    GLUC 223 (H) 01/31/2020    GLUF 175 (H) 03/16/2020    CALCIUM 9 6 03/16/2020    AST 21 03/16/2020    ALT 25 03/16/2020    ALKPHOS 39 (L) 03/16/2020    PROT 6 6 04/11/2018    TP 7 6 03/16/2020    BILITOT 0 7 04/11/2018    TBILI 0 50 03/16/2020    EGFR 27 03/16/2020      Lab Results   Component Value Date    CREATININE 2 41 (H) 03/16/2020    CREATININE 2 53 (H) 01/31/2020    CREATININE 2 06 (H) 06/16/2019    CREATININE 1 49 (H) 05/06/2019    CREATININE 1 47 (H) 07/31/2018    CREATININE 1 31 (H) 06/19/2018    CREATININE 1 55 (H) 05/15/2018    CREATININE 1 02 04/11/2018    CREATININE 1 11 04/10/2018    CREATININE 1 42 (H) 04/09/2018    CREATININE 1 86 (H) 04/08/2018    CREATININE 2 33 (H) 04/07/2018    CREATININE 2 49 (H) 04/06/2018      Lab Results   Component Value Date    COLORU Straw 03/16/2020    CLARITYU Clear 03/16/2020    SPECGRAV 1 025 03/16/2020    PHUR 5 5 03/16/2020    LEUKOCYTESUR Negative 03/16/2020    NITRITE Negative 03/16/2020    PROTEIN UA 77 9 03/15/2021    GLUCOSEU 1+ (A) 03/16/2020    KETONESU Negative 03/16/2020    UROBILINOGEN 0 2 03/16/2020    BILIRUBINUR Negative 03/16/2020    BLOODU Negative 03/16/2020    RBCUA 0-1 (A) 03/16/2020    WBCUA None Seen 03/16/2020    EPIS None Seen 03/16/2020    BACTERIA None Seen 03/16/2020      No results found for: LABPROT  No results found for: Carolyne Zhannael Results   Component Value Date    WBC 6 40 03/16/2020    HGB 13 6 (L) 03/16/2020    HCT 41 5 (L) 03/16/2020    MCV 80 (L) 03/16/2020     03/16/2020      Lab Results   Component Value Date    HGB 13 6 (L) 03/16/2020    HGB 12 3 (L) 06/16/2019    HGB 14 7 05/06/2019    HGB 14 6 07/31/2018    HGB 12 1 (L) 04/11/2018      No results found for: IRON, TIBC, FERRITIN   No results found for: PTHCALCIUM, [id number], PHOSPHORUS   Lab Results   Component Value Date    CHOLESTEROL 134 03/16/2020    HDL 35 (L) 03/16/2020    LDLCALC 72 03/16/2020    TRIG 136 03/16/2020      Lab Results   Component Value Date    URICACID 7 4 03/16/2020      Lab Results   Component Value Date    HGBA1C 8 1 (H) 03/23/2021      No results found for: TSHANTIBODY, P8RIPIJ, FREET4   No results found for: BRIDGET, DSDNAAB, RFIGM   Lab Results   Component Value Date    PROT 6 6 04/11/2018        Portions of the record may have been created with voice recognition software  Occasional wrong word or "sound a like" substitutions may have occurred due to the inherent limitations of voice recognition software  Read the chart carefully and recognize, using context, where substitutions have occurred  If you have any questions, please contact the dictating provider

## 2021-07-01 NOTE — ASSESSMENT & PLAN NOTE
Lab Results   Component Value Date    EGFR 27 03/16/2020    EGFR 25 01/31/2020    EGFR 33 06/16/2019    CREATININE 2 41 (H) 03/16/2020    CREATININE 2 53 (H) 01/31/2020    CREATININE 2 06 (H) 06/16/2019     · Renal function remained stable with a BUN of 34 mg/dL with a creatinine of 2 26 mg/dL estimated GFR of 24 mL/min with no electrolyte abnormalities on 06/11/2021  ·   · Urinalysis reveals specific gravity 1 015, proteinuria and glucosuria and is otherwise bland on 06/11/2021  ·   · Protein to creatinine ratio of 86 4 mg/dL with Microalbumin to creatinine ratio of 444 4 milligram/gram creatinine on 06/11/2021  He is not on a RAAS inhibitor  He does not want another pill

## 2021-07-01 NOTE — ASSESSMENT & PLAN NOTE
· Total cholesterol is well controlled at 133 mg/dL with an HDL of 39 mg/dL and an LDL of 77 mg/dL with triglycerides 83 mg/dL on 06/11/2021  · Continue atorvastatin 80 mg daily and fenofibrate 54 mg daily

## 2021-07-01 NOTE — ASSESSMENT & PLAN NOTE
Lab Results   Component Value Date    HGBA1C 8 1 (H) 03/23/2021   · He reports blood glucose around 130s when he checks, but he does not wish to continue close monitoring  · Followed by Endocrinology

## 2021-07-01 NOTE — ASSESSMENT & PLAN NOTE
· PTH is appropriate for renal function at 47 4 pg/milliliter with a low 25 hydroxy vitamin-D of 29 ng/mL on 06/11/2021  · Will start Vitamin D3 1,000 units daily

## 2021-07-31 ENCOUNTER — OFFICE VISIT (OUTPATIENT)
Dept: URGENT CARE | Facility: CLINIC | Age: 66
End: 2021-07-31
Payer: MEDICARE

## 2021-07-31 ENCOUNTER — APPOINTMENT (EMERGENCY)
Dept: CT IMAGING | Facility: HOSPITAL | Age: 66
End: 2021-07-31
Payer: MEDICARE

## 2021-07-31 ENCOUNTER — APPOINTMENT (EMERGENCY)
Dept: RADIOLOGY | Facility: HOSPITAL | Age: 66
End: 2021-07-31
Payer: MEDICARE

## 2021-07-31 ENCOUNTER — HOSPITAL ENCOUNTER (EMERGENCY)
Facility: HOSPITAL | Age: 66
Discharge: HOME | End: 2021-07-31
Attending: EMERGENCY MEDICINE | Admitting: EMERGENCY MEDICINE
Payer: MEDICARE

## 2021-07-31 VITALS
BODY MASS INDEX: 37.8 KG/M2 | OXYGEN SATURATION: 95 % | HEIGHT: 70 IN | SYSTOLIC BLOOD PRESSURE: 124 MMHG | WEIGHT: 264 LBS | RESPIRATION RATE: 18 BRPM | DIASTOLIC BLOOD PRESSURE: 70 MMHG | TEMPERATURE: 98.8 F | HEART RATE: 73 BPM

## 2021-07-31 VITALS
TEMPERATURE: 100.6 F | BODY MASS INDEX: 37.22 KG/M2 | HEART RATE: 74 BPM | HEIGHT: 70 IN | WEIGHT: 260 LBS | RESPIRATION RATE: 21 BRPM | OXYGEN SATURATION: 95 % | DIASTOLIC BLOOD PRESSURE: 69 MMHG | SYSTOLIC BLOOD PRESSURE: 147 MMHG

## 2021-07-31 DIAGNOSIS — R39.89 URINE DISCOLORATION: ICD-10-CM

## 2021-07-31 DIAGNOSIS — N39.0 UTI (URINARY TRACT INFECTION): Primary | ICD-10-CM

## 2021-07-31 DIAGNOSIS — B34.9 VIRAL SYNDROME: ICD-10-CM

## 2021-07-31 DIAGNOSIS — N18.4 STAGE 4 CHRONIC KIDNEY DISEASE (HCC): ICD-10-CM

## 2021-07-31 DIAGNOSIS — R06.00 DYSPNEA ON EXERTION: Primary | ICD-10-CM

## 2021-07-31 LAB
ALBUMIN SERPL BCP-MCNC: 4.1 G/DL (ref 3.5–5.7)
ALP SERPL-CCNC: 37 U/L (ref 55–165)
ALT SERPL W P-5'-P-CCNC: 43 U/L (ref 7–52)
ANION GAP SERPL CALCULATED.3IONS-SCNC: 9 MMOL/L (ref 4–13)
APTT PPP: 30 SECONDS (ref 23–37)
AST SERPL W P-5'-P-CCNC: 48 U/L (ref 13–39)
BACTERIA UR QL AUTO: NORMAL /HPF
BASOPHILS # BLD AUTO: 0 THOUSANDS/ΜL (ref 0–0.1)
BASOPHILS NFR BLD AUTO: 1 % (ref 0–2)
BILIRUB SERPL-MCNC: 0.4 MG/DL (ref 0.2–1)
BILIRUB UR QL STRIP: NEGATIVE
BUN SERPL-MCNC: 32 MG/DL (ref 7–25)
CALCIUM SERPL-MCNC: 9.3 MG/DL (ref 8.6–10.5)
CHLORIDE SERPL-SCNC: 98 MMOL/L (ref 98–107)
CLARITY UR: CLEAR
CO2 SERPL-SCNC: 28 MMOL/L (ref 21–31)
COLOR UR: YELLOW
CREAT SERPL-MCNC: 2.74 MG/DL (ref 0.7–1.3)
EOSINOPHIL # BLD AUTO: 0 THOUSAND/ΜL (ref 0–0.61)
EOSINOPHIL NFR BLD AUTO: 0 % (ref 0–5)
ERYTHROCYTE [DISTWIDTH] IN BLOOD BY AUTOMATED COUNT: 14.5 % (ref 11.5–14.5)
GFR SERPL CREATININE-BSD FRML MDRD: 23 ML/MIN/1.73SQ M
GLUCOSE SERPL-MCNC: 146 MG/DL (ref 65–99)
GLUCOSE UR STRIP-MCNC: NEGATIVE MG/DL
HCT VFR BLD AUTO: 38.6 % (ref 42–47)
HGB BLD-MCNC: 12.8 G/DL (ref 14–18)
HGB UR QL STRIP.AUTO: ABNORMAL
INR PPP: 1.22 (ref 0.84–1.19)
KETONES UR STRIP-MCNC: NEGATIVE MG/DL
LACTATE SERPL-SCNC: 1 MMOL/L (ref 0.5–2)
LEUKOCYTE ESTERASE UR QL STRIP: NEGATIVE
LIPASE SERPL-CCNC: 38 U/L (ref 11–82)
LYMPHOCYTES # BLD AUTO: 0.5 THOUSANDS/ΜL (ref 0.6–4.47)
LYMPHOCYTES NFR BLD AUTO: 12 % (ref 21–51)
MAGNESIUM SERPL-MCNC: 2.1 MG/DL (ref 1.9–2.7)
MCH RBC QN AUTO: 26.2 PG (ref 26–34)
MCHC RBC AUTO-ENTMCNC: 33.1 G/DL (ref 31–37)
MCV RBC AUTO: 79 FL (ref 81–99)
MONOCYTES # BLD AUTO: 0.6 THOUSAND/ΜL (ref 0.17–1.22)
MONOCYTES NFR BLD AUTO: 16 % (ref 2–12)
NEUTROPHILS # BLD AUTO: 2.8 THOUSANDS/ΜL (ref 1.4–6.5)
NEUTS SEG NFR BLD AUTO: 71 % (ref 42–75)
NITRITE UR QL STRIP: NEGATIVE
NON-SQ EPI CELLS URNS QL MICRO: NORMAL /HPF
PH UR STRIP.AUTO: 6 [PH]
PLATELET # BLD AUTO: 178 THOUSANDS/UL (ref 149–390)
PMV BLD AUTO: 8.7 FL (ref 8.6–11.7)
POTASSIUM SERPL-SCNC: 3.9 MMOL/L (ref 3.5–5.5)
PROT SERPL-MCNC: 8 G/DL (ref 6.4–8.9)
PROT UR STRIP-MCNC: ABNORMAL MG/DL
PROTHROMBIN TIME: 15.3 SECONDS (ref 11.6–14.5)
RBC # BLD AUTO: 4.88 MILLION/UL (ref 4.3–5.9)
RBC #/AREA URNS AUTO: NORMAL /HPF
SARS-COV-2 RNA RESP QL NAA+PROBE: NEGATIVE
SODIUM SERPL-SCNC: 135 MMOL/L (ref 134–143)
SP GR UR STRIP.AUTO: 1.02 (ref 1–1.03)
TROPONIN I SERPL-MCNC: <0.03 NG/ML
UROBILINOGEN UR QL STRIP.AUTO: 0.2 E.U./DL
WBC # BLD AUTO: 3.9 THOUSAND/UL (ref 4.8–10.8)
WBC #/AREA URNS AUTO: NORMAL /HPF

## 2021-07-31 PROCEDURE — 84484 ASSAY OF TROPONIN QUANT: CPT | Performed by: EMERGENCY MEDICINE

## 2021-07-31 PROCEDURE — 36415 COLL VENOUS BLD VENIPUNCTURE: CPT | Performed by: EMERGENCY MEDICINE

## 2021-07-31 PROCEDURE — 99213 OFFICE O/P EST LOW 20 MIN: CPT | Performed by: NURSE PRACTITIONER

## 2021-07-31 PROCEDURE — 84145 PROCALCITONIN (PCT): CPT | Performed by: EMERGENCY MEDICINE

## 2021-07-31 PROCEDURE — 87040 BLOOD CULTURE FOR BACTERIA: CPT | Performed by: EMERGENCY MEDICINE

## 2021-07-31 PROCEDURE — U0003 INFECTIOUS AGENT DETECTION BY NUCLEIC ACID (DNA OR RNA); SEVERE ACUTE RESPIRATORY SYNDROME CORONAVIRUS 2 (SARS-COV-2) (CORONAVIRUS DISEASE [COVID-19]), AMPLIFIED PROBE TECHNIQUE, MAKING USE OF HIGH THROUGHPUT TECHNOLOGIES AS DESCRIBED BY CMS-2020-01-R: HCPCS | Performed by: EMERGENCY MEDICINE

## 2021-07-31 PROCEDURE — 81003 URINALYSIS AUTO W/O SCOPE: CPT | Performed by: EMERGENCY MEDICINE

## 2021-07-31 PROCEDURE — 93005 ELECTROCARDIOGRAM TRACING: CPT

## 2021-07-31 PROCEDURE — 80053 COMPREHEN METABOLIC PANEL: CPT | Performed by: EMERGENCY MEDICINE

## 2021-07-31 PROCEDURE — 83605 ASSAY OF LACTIC ACID: CPT | Performed by: EMERGENCY MEDICINE

## 2021-07-31 PROCEDURE — 71250 CT THORAX DX C-: CPT

## 2021-07-31 PROCEDURE — 83690 ASSAY OF LIPASE: CPT | Performed by: EMERGENCY MEDICINE

## 2021-07-31 PROCEDURE — 74176 CT ABD & PELVIS W/O CONTRAST: CPT

## 2021-07-31 PROCEDURE — 99285 EMERGENCY DEPT VISIT HI MDM: CPT

## 2021-07-31 PROCEDURE — U0005 INFEC AGEN DETEC AMPLI PROBE: HCPCS | Performed by: EMERGENCY MEDICINE

## 2021-07-31 PROCEDURE — 96365 THER/PROPH/DIAG IV INF INIT: CPT

## 2021-07-31 PROCEDURE — G1004 CDSM NDSC: HCPCS

## 2021-07-31 PROCEDURE — 99284 EMERGENCY DEPT VISIT MOD MDM: CPT | Performed by: EMERGENCY MEDICINE

## 2021-07-31 PROCEDURE — 83735 ASSAY OF MAGNESIUM: CPT | Performed by: EMERGENCY MEDICINE

## 2021-07-31 PROCEDURE — 85610 PROTHROMBIN TIME: CPT | Performed by: EMERGENCY MEDICINE

## 2021-07-31 PROCEDURE — 96366 THER/PROPH/DIAG IV INF ADDON: CPT

## 2021-07-31 PROCEDURE — 85730 THROMBOPLASTIN TIME PARTIAL: CPT | Performed by: EMERGENCY MEDICINE

## 2021-07-31 PROCEDURE — 71045 X-RAY EXAM CHEST 1 VIEW: CPT

## 2021-07-31 PROCEDURE — 81001 URINALYSIS AUTO W/SCOPE: CPT | Performed by: EMERGENCY MEDICINE

## 2021-07-31 PROCEDURE — 85025 COMPLETE CBC W/AUTO DIFF WBC: CPT | Performed by: EMERGENCY MEDICINE

## 2021-07-31 PROCEDURE — 96367 TX/PROPH/DG ADDL SEQ IV INF: CPT

## 2021-07-31 PROCEDURE — G0463 HOSPITAL OUTPT CLINIC VISIT: HCPCS | Performed by: NURSE PRACTITIONER

## 2021-07-31 RX ORDER — ACETAMINOPHEN 325 MG/1
650 TABLET ORAL ONCE
Status: COMPLETED | OUTPATIENT
Start: 2021-07-31 | End: 2021-07-31

## 2021-07-31 RX ORDER — CEFTRIAXONE 2 G/50ML
2000 INJECTION, SOLUTION INTRAVENOUS ONCE
Status: COMPLETED | OUTPATIENT
Start: 2021-07-31 | End: 2021-07-31

## 2021-07-31 RX ORDER — CEPHALEXIN 500 MG/1
500 CAPSULE ORAL EVERY 6 HOURS SCHEDULED
Qty: 28 CAPSULE | Refills: 0 | Status: SHIPPED | OUTPATIENT
Start: 2021-08-01 | End: 2021-08-08

## 2021-07-31 RX ADMIN — CEFTRIAXONE 2000 MG: 2 INJECTION, SOLUTION INTRAVENOUS at 20:52

## 2021-07-31 RX ADMIN — ACETAMINOPHEN 650 MG: 325 TABLET ORAL at 22:30

## 2021-07-31 RX ADMIN — SODIUM CHLORIDE 1000 ML: 0.9 INJECTION, SOLUTION INTRAVENOUS at 20:51

## 2021-07-31 RX ADMIN — AZITHROMYCIN MONOHYDRATE 500 MG: 500 INJECTION, POWDER, LYOPHILIZED, FOR SOLUTION INTRAVENOUS at 21:22

## 2021-07-31 NOTE — PROGRESS NOTES
[facility]        NAME: Soila Aldana is a 74 y o  male  : 1955    MRN: [id number]  DATE: 2021  TIME: 7:47 PM      Assessment and Plan     Dyspnea on exertion [R06 00]  1  Dyspnea on exertion  Transfer to other facility   2  Stage 4 chronic kidney disease ([facility] [address] )  Transfer to other facility   3  Urine discoloration  Transfer to other facility   4  Viral syndrome  Transfer to other facility     Report called to [facility] in ER  Patient Instructions     Patient Instructions   Report to ER for further evaluation and treatment  Follow up with PCP in 3-5 days  Proceed to  ER if symptoms worsen  Chief Complaint     Chief Complaint   Patient presents with    Cough     cough, chills, fever, achey, nausea, abdominal pain for 2-3 days         History of Present Illness     Onset of s/s 3 days ago  Symptoms include headache, cough, chills, aches, nausea, abdominal pain  Shortness of breath on exertion--had to stop intermediate back between the waiting room and the exam room to catch his breath  States he has had coughing fits which causes shortness of breath--he had an episode right before he walked back--states he felt like he could not breath  Initial sat 92% on room air; with rest was 95%  Has a history of stage 4 ckd; left nephrectomy for cancer about 60 years ago  Daughter and family were just visiting from [place] (not vaccinated)  Patient is fully vaccinated  Review of Systems     Review of Systems   Constitutional: Positive for appetite change, chills, fatigue and fever  HENT: Negative for congestion and sore throat  Respiratory: Positive for cough and shortness of breath  Gastrointestinal: Positive for abdominal pain (left, at site of prior kidney removal; has had off/on for 60 years ) and nausea  Negative for constipation, diarrhea and vomiting  Musculoskeletal: Positive for arthralgias and myalgias  Neurological: Positive for headaches     All other systems reviewed and are negative  Current Medications       Current Outpatient Medications:     albuterol (VENTOLIN HFA) 90 mcg/act inhaler, Inhale 2 puffs every 4 (four) hours as needed, Disp: , Rfl:     amLODIPine (NORVASC) 10 mg tablet, , Disp: , Rfl:     aspirin 81 mg chewable tablet, Chew 81 mg, Disp: , Rfl:     atorvastatin (LIPITOR) 80 mg tablet, Take by mouth, Disp: , Rfl:     cholecalciferol (VITAMIN D3) 1,000 units tablet, Take 1 tablet (1,000 Units total) by mouth daily, Disp: 90 tablet, Rfl: 1    diclofenac sodium (VOLTAREN) 1 %, apply 2 grams to affected area four times a day, Disp: , Rfl:     fenofibrate (TRICOR) 54 MG tablet, Take 54 mg by mouth daily, Disp: , Rfl:     glucose blood (OneTouch Verio) test strip, Use to test blood sugar three times daily  , Disp: 200 each, Rfl: 3    hydrochlorothiazide (HYDRODIURIL) 25 mg tablet, , Disp: , Rfl:     hydrocortisone 2 5 % cream, as needed , Disp: , Rfl:     isosorbide mononitrate (IMDUR) 60 mg 24 hr tablet, Take 60 mg by mouth Take 1/2 tablet by mouth bid, Disp: , Rfl:     linaGLIPtin (Tradjenta) 5 MG TABS, Take 5 mg by mouth daily, Disp: 90 mg, Rfl: 3    MAGNESIUM PO, Take 500 mg by mouth, Disp: , Rfl:     metoprolol tartrate (LOPRESSOR) 50 mg tablet, Take 50 mg by mouth every 12 (twelve) hours, Disp: , Rfl:     Multiple Vitamin (MULTIVITAMIN ADULT PO), Take by mouth, Disp: , Rfl:     nateglinide (STARLIX) 120 mg tablet, Take 1 tablet (120 mg total) by mouth 3 (three) times a day before meals, Disp: 270 tablet, Rfl: 1    nitroglycerin (NITROSTAT) 0 4 mg SL tablet, , Disp: , Rfl:     OneTouch Delica Lancets 71X MISC, Use to test blood sugar three times daily  , Disp: 200 each, Rfl: 3    pioglitazone (ACTOS) 15 mg tablet, Take 1 tablet (15 mg total) by mouth daily, Disp: 90 tablet, Rfl: 1    rivaroxaban (Xarelto) 2 5 mg tablet, Take 2 5 mg by mouth 2 (two) times a day , Disp: , Rfl:     sertraline (ZOLOFT) 50 mg tablet, Take 1 tablet (50 mg total) by mouth daily, Disp: 90 tablet, Rfl: 1    traZODone (DESYREL) 100 mg tablet, , Disp: , Rfl:     insulin glargine (Toujeo SoloStar) 300 units/mL CONCETRATED U-300 injection pen (1-unit dial), Inject 60 units daily  (Patient not taking: Reported on 7/31/2021), Disp: 12 pen, Rfl: 1    Current Allergies     Allergies as of 07/31/2021 - Reviewed 07/31/2021   Allergen Reaction Noted    Percocet [oxycodone-acetaminophen]  11/14/2019    Tetracycline  02/02/2017              The following portions of the patient's history were reviewed and updated as appropriate: allergies, current medications, past family history, past medical history, past social history, past surgical history and problem list      Past Medical History:   Diagnosis Date    Angina pectoris, unspecified ([address] )     Cancer of kidney ([facility] [address] ) 2019    Chronic kidney disease, stage 2 (mild)     Chronic kidney disease, stage 3 ([address] )     Diabetes mellitus ([address] )     Essential hypertension     Hypercholesterolemia     Hypertension     Nephrolithiasis     calcium oxalate - had lithotripsy     Seasonal allergies     Stage 3 chronic kidney disease ([address] ) 11/14/2019    Type 2 diabetes mellitus (HCC)     Type II diabetes mellitus, uncontrolled ([address] )        Past Surgical History:   Procedure Laterality Date    ARTHROSCOPY KNEE Bilateral     CARDIAC SURGERY      balloon procedure and stent placement     FOOT SURGERY Right     implants    LAMINECTOMY      NEPHRECTOMY Left 06/2019    PARTIAL HIP ARTHROPLASTY Right     ROTATOR CUFF REPAIR      SHOULDER ARTHROSCOPY Right     SHOULDER SURGERY Left     removal of bone spur       Family History   Problem Relation Age of Onset    Diabetes Mother     Heart attack Father     Heart disease Father          Medications have been verified          Objective     /70   Pulse 73   Temp 98 8 °F (37 1 °C) (Temporal)   Resp 18   Ht 5' 10" (1 778 m)   Wt 120 kg (264 lb)   SpO2 95%   BMI 37 88 kg/m² No LMP for male patient  Physical Exam     Physical Exam  Vitals and nursing note reviewed  Constitutional:       General: He is not in acute distress  Appearance: Normal appearance  He is well-developed and well-groomed  He is ill-appearing  He is not toxic-appearing or diaphoretic  HENT:      Head: Normocephalic and atraumatic  Right Ear: Tympanic membrane, ear canal and external ear normal  There is no impacted cerumen  Left Ear: Tympanic membrane, ear canal and external ear normal  There is no impacted cerumen  Ears:      Comments: Mildly [place] at baseline  Eyes:      Pupils: Pupils are equal, round, and reactive to light  Cardiovascular:      Rate and Rhythm: Normal rate and regular rhythm  No extrasystoles are present  Heart sounds: Normal heart sounds, S1 normal and S2 normal  No murmur heard  No friction rub  No gallop  Pulmonary:      Effort: Pulmonary effort is normal  No tachypnea, bradypnea, accessory muscle usage, prolonged expiration, respiratory distress or retractions  Breath sounds: Normal air entry  No stridor or decreased air movement  Examination of the right-upper field reveals decreased breath sounds  Examination of the left-upper field reveals decreased breath sounds  Examination of the right-middle field reveals decreased breath sounds  Examination of the left-middle field reveals decreased breath sounds  Examination of the right-lower field reveals decreased breath sounds  Examination of the left-lower field reveals decreased breath sounds  Decreased breath sounds present  No wheezing, rhonchi or rales  Chest:      Chest wall: No tenderness  Abdominal:      General: Bowel sounds are normal  There is no distension  Palpations: Abdomen is soft  Tenderness: There is no abdominal tenderness  Musculoskeletal:         General: Normal range of motion  Cervical back: Normal range of motion and neck supple     Skin:     General: Skin is warm and dry  Capillary Refill: Capillary refill takes less than 2 seconds  Neurological:      General: No focal deficit present  Mental Status: He is alert and oriented to person, place, and time  Psychiatric:         Attention and Perception: Attention and perception normal          Mood and Affect: Mood and affect normal          Speech: Speech normal          Behavior: Behavior normal  Behavior is cooperative  Thought Content:  Thought content normal          Cognition and Memory: Cognition and memory normal          Judgment: Judgment normal

## 2021-08-01 LAB — PROCALCITONIN SERPL-MCNC: 0.37 NG/ML

## 2021-08-01 NOTE — ED PROVIDER NOTES
History  Chief Complaint   Patient presents with    Weakness - Generalized     Patient is a 41-year-old male presenting with fever, dyspnea on exertion, generalized weakness since yesterday  Patient had a cough yesterday but states not today  He has had some nausea and vomiting but no abdominal pain, flank pain, diarrhea  He had a visitor from [place] who is unvaccinated  He states he received his COVID vaccine (both doses)      Shortness of Breath  Severity:  Mild  Onset quality:  Gradual  Timing:  Constant  Chronicity:  New  Relieved by:  Nothing  Worsened by:  Nothing  Ineffective treatments:  None tried  Associated symptoms: cough and fever    Associated symptoms: no abdominal pain, no chest pain, no rash, no sore throat, no vomiting and no wheezing        Prior to Admission Medications   Prescriptions Last Dose Informant Patient Reported? Taking? MAGNESIUM PO  Self Yes No   Sig: Take 500 mg by mouth   Multiple Vitamin (MULTIVITAMIN ADULT PO)  Self Yes No   Sig: Take by mouth   OneTouch Delica Lancets 26R MISC  Self No No   Sig: Use to test blood sugar three times daily  albuterol (VENTOLIN HFA) 90 mcg/act inhaler  Self Yes No   Sig: Inhale 2 puffs every 4 (four) hours as needed   amLODIPine (NORVASC) 10 mg tablet  Self Yes No   aspirin 81 mg chewable tablet  Self Yes No   Sig: Chew 81 mg   atorvastatin (LIPITOR) 80 mg tablet  Self Yes No   Sig: Take by mouth   cholecalciferol (VITAMIN D3) 1,000 units tablet   No No   Sig: Take 1 tablet (1,000 Units total) by mouth daily   diclofenac sodium (VOLTAREN) 1 %  Self Yes No   Sig: apply 2 grams to affected area four times a day   fenofibrate (TRICOR) 54 MG tablet  Self Yes No   Sig: Take 54 mg by mouth daily   glucose blood (OneTouch Verio) test strip  Self No No   Sig: Use to test blood sugar three times daily     hydrochlorothiazide (HYDRODIURIL) 25 mg tablet  Self Yes No   hydrocortisone 2 5 % cream  Self Yes No   Sig: as needed    insulin glargine (Toujeo SoloStar) 300 units/mL CONCETRATED U-300 injection pen (1-unit dial)  Self No No   Sig: Inject 60 units daily     Patient not taking: Reported on 7/31/2021   isosorbide mononitrate (IMDUR) 60 mg 24 hr tablet  Self Yes No   Sig: Take 60 mg by mouth Take 1/2 tablet by mouth bid   linaGLIPtin (Tradjenta) 5 MG TABS  Self No No   Sig: Take 5 mg by mouth daily   metoprolol tartrate (LOPRESSOR) 50 mg tablet  Self Yes No   Sig: Take 50 mg by mouth every 12 (twelve) hours   nateglinide (STARLIX) 120 mg tablet  Self No No   Sig: Take 1 tablet (120 mg total) by mouth 3 (three) times a day before meals   nitroglycerin (NITROSTAT) 0 4 mg SL tablet  Self Yes No   pioglitazone (ACTOS) 15 mg tablet  Self No No   Sig: Take 1 tablet (15 mg total) by mouth daily   rivaroxaban (Xarelto) 2 5 mg tablet  Self Yes No   Sig: Take 2 5 mg by mouth 2 (two) times a day    sertraline (ZOLOFT) 50 mg tablet  Self No No   Sig: Take 1 tablet (50 mg total) by mouth daily   traZODone (DESYREL) 100 mg tablet  Self Yes No      Facility-Administered Medications: None       Past Medical History:   Diagnosis Date    [facility]     Cancer of kidney ([facility] ) 2019    Chronic kidney disease, stage 2 (mild)     Chronic kidney disease, stage 3 ([facility] )     Diabetes mellitus ([facility] )     Essential hypertension     Hypercholesterolemia     Hypertension     Nephrolithiasis     calcium oxalate - had lithotripsy     Seasonal allergies     Stage 3 chronic kidney disease ([facility] ) 11/14/2019    Type 2 diabetes mellitus (HCC)     Type II diabetes mellitus, uncontrolled (HCC)        Past Surgical History:   Procedure Laterality Date    ARTHROSCOPY KNEE Bilateral     CARDIAC SURGERY      balloon procedure and stent placement     FOOT SURGERY Right     implants    LAMINECTOMY      NEPHRECTOMY Left 06/2019    PARTIAL HIP ARTHROPLASTY Right     ROTATOR CUFF REPAIR      SHOULDER ARTHROSCOPY Right     SHOULDER SURGERY Left     removal of bone spur       Family History   Problem Relation Age of Onset    Diabetes Mother     Heart attack Father     Heart disease Father      I have reviewed and agree with the history as documented  E-Cigarette/Vaping    E-Cigarette Use Never User      E-Cigarette/Vaping Substances    Nicotine No     THC No     CBD No     Flavoring No     Other No     Unknown No      Social History     Tobacco Use    Smoking status: Never Smoker    Smokeless tobacco: Never Used   Vaping Use    Vaping Use: Never used   Substance Use Topics    Alcohol use: No    Drug use: No       Review of Systems   Constitutional: Positive for fever  Negative for chills  HENT: Negative for congestion, nosebleeds, rhinorrhea and sore throat  Eyes: Negative for pain and visual disturbance  Respiratory: Positive for cough and shortness of breath  Negative for wheezing  Cardiovascular: Negative for chest pain and leg swelling  Gastrointestinal: Negative for abdominal distention, abdominal pain, diarrhea, nausea and vomiting  Genitourinary: Negative for dysuria and frequency  Musculoskeletal: Negative for back pain and joint swelling  Skin: Negative for rash and wound  Neurological: Negative for weakness and numbness  Psychiatric/Behavioral: Negative for decreased concentration and suicidal ideas  Physical Exam  Physical Exam  Vitals and nursing note reviewed  Constitutional:       Appearance: He is well-developed  HENT:      Head: Normocephalic and atraumatic  Eyes:      Conjunctiva/sclera: Conjunctivae normal       Pupils: Pupils are equal, round, and reactive to light  Neck:      Trachea: No tracheal deviation  Cardiovascular:      Rate and Rhythm: Normal rate and regular rhythm  Heart sounds: Normal heart sounds  No murmur heard  Pulmonary:      Effort: Pulmonary effort is normal  No respiratory distress  Breath sounds: Normal breath sounds  No wheezing or rales     Abdominal:      General: Bowel sounds are normal  There is no distension  Palpations: Abdomen is soft  Tenderness: There is no abdominal tenderness  Musculoskeletal:         General: No deformity  Cervical back: Normal range of motion and neck supple  Skin:     General: Skin is warm and dry  Capillary Refill: Capillary refill takes less than 2 seconds  Neurological:      Mental Status: He is alert and oriented to person, place, and time  Sensory: No sensory deficit     Psychiatric:         Judgment: Judgment normal          Vital Signs  ED Triage Vitals   Temperature Pulse Respirations Blood Pressure SpO2   07/31/21 2002 07/31/21 2002 07/31/21 2002 07/31/21 2003 07/31/21 2003   (!) 101 4 °F (38 6 °C) 76 (!) 24 157/74 96 %      Temp Source Heart Rate Source Patient Position - Orthostatic VS BP Location FiO2 (%)   07/31/21 2252 07/31/21 2230 -- -- --   Tympanic Monitor         Pain Score       07/31/21 2230       Med Not Given for Pain - for MAR use only           Vitals:    07/31/21 2003 07/31/21 2230 07/31/21 2300 07/31/21 2315   BP: 157/74 147/69     Pulse:  74 74 74         Visual Acuity      ED Medications  Medications   sodium chloride 0 9 % bolus 1,000 mL (0 mL Intravenous Stopped 7/31/21 2209)   azithromycin (ZITHROMAX) 500 mg in sodium chloride 0 9 % 250 mL IVPB (0 mg Intravenous Stopped 7/31/21 2310)   cefTRIAXone (ROCEPHIN) IVPB (premix in dextrose) 2,000 mg 50 mL (0 mg Intravenous Stopped 7/31/21 2122)   acetaminophen (TYLENOL) tablet 650 mg (650 mg Oral Given 7/31/21 2230)       Diagnostic Studies  Results Reviewed     Procedure Component Value Units Date/Time    Urine Microscopic [[id number]]  (Normal) Collected: 07/31/21 2156    Lab Status: Final result Specimen: Urine, Other Updated: 07/31/21 2229     RBC, UA 1-2 /hpf      WBC, UA 0-1 /hpf      Epithelial Cells None Seen /hpf      Bacteria, UA Occasional /hpf     UA w Reflex to Microscopic w Reflex to Culture [[id number]]  (Abnormal) Collected: 07/31/21 2156    Lab Status: Final result Specimen: Urine, Other Updated: 07/31/21 2211     Color, UA Yellow     Clarity, UA Clear     Specific Gravity, UA 1 025     pH, UA 6 0     Leukocytes, UA Negative     Nitrite, UA Negative     Protein, UA 2+ mg/dl      Glucose, UA Negative mg/dl      Ketones, UA Negative mg/dl      Urobilinogen, UA 0 2 E U /dl      Bilirubin, UA Negative     Blood, UA 1+    Novel Coronavirus Idris HEAD [[id number]]  (Normal) Collected: 07/31/21 2032    Lab Status: Final result Specimen: Nares from Nose Updated: 07/31/21 2139     SARS-CoV-2 Negative    Narrative: The specimen collection materials, transport medium, and/or testing methodology utilized in the production of these test results have been proven to be reliable in a limited validation with an abbreviated program under the Emergency Utilization Authorization provided by the FDA  Testing reported as "Presumptive positive" will be confirmed with secondary testing to ensure result accuracy  Clinical caution and judgement should be used with the interpretation of these results with consideration of the clinical impression and other laboratory testing  Testing reported as "Positive" or "Negative" has been proven to be accurate according to standard laboratory validation requirements  All testing is performed with control materials showing appropriate reactivity at standard intervals        Protime-INR [[id number]]  (Abnormal) Collected: 07/31/21 2032    Lab Status: Final result Specimen: Blood from Arm, Right Updated: 07/31/21 2118     Protime 15 3 seconds      INR 1 22    APTT [[id number]]  (Normal) Collected: 07/31/21 2032    Lab Status: Final result Specimen: Blood from Arm, Right Updated: 07/31/21 2118     PTT 30 seconds     Troponin I [[id number]]  (Normal) Collected: 07/31/21 2032    Lab Status: Final result Specimen: Blood from Arm, Right Updated: 07/31/21 2104     Troponin I <0 03 ng/mL     Comprehensive metabolic panel [[id number]]  (Abnormal) Collected: 07/31/21 2032    Lab Status: Final result Specimen: Blood from Arm, Right Updated: 07/31/21 2102     Sodium 135 mmol/L      Potassium 3 9 mmol/L      Chloride 98 mmol/L      CO2 28 mmol/L      ANION GAP 9 mmol/L      BUN 32 mg/dL      Creatinine 2 74 mg/dL      Glucose 146 mg/dL      Calcium 9 3 mg/dL      AST 48 U/L      ALT 43 U/L      Alkaline Phosphatase 37 U/L      Total Protein 8 0 g/dL      Albumin 4 1 g/dL      Total Bilirubin 0 40 mg/dL      eGFR 23 ml/min/1 73sq m     Narrative:      [place] guidelines for Chronic Kidney Disease (CKD):     Stage 1 with normal or high GFR (GFR > 90 mL/min/1 73 square meters)    Stage 2 Mild CKD (GFR = 60-89 mL/min/1 73 square meters)    Stage 3A Moderate CKD (GFR = 45-59 mL/min/1 73 square meters)    Stage 3B Moderate CKD (GFR = 30-44 mL/min/1 73 square meters)    Stage 4 Severe CKD (GFR = 15-29 mL/min/1 73 square meters)    Stage 5 End Stage CKD (GFR <15 mL/min/1 73 square meters)  Note: GFR calculation is accurate only with a steady state creatinine    Magnesium [[id number]]  (Normal) Collected: 07/31/21 2032    Lab Status: Final result Specimen: Blood from Arm, Right Updated: 07/31/21 2101     Magnesium 2 1 mg/dL     Lipase [[id number]]  (Normal) Collected: 07/31/21 2032    Lab Status: Final result Specimen: Blood from Arm, Right Updated: 07/31/21 2101     Lipase 38 u/L     Lactic acid [[id number]]  (Normal) Collected: 07/31/21 2032    Lab Status: Final result Specimen: Blood from Arm, Right Updated: 07/31/21 2100     LACTIC ACID 1 0 mmol/L     Narrative:      Result may be elevated if tourniquet was used during collection  Blood culture #2 [[id number]] Collected: 07/31/21 2040    Lab Status: In process Specimen: Blood from Line, Venous Updated: 07/31/21 2052    Blood culture #1 [[id number]] Collected: 07/31/21 2032    Lab Status:  In process Specimen: Blood from Line, Venous Updated: 07/31/21 2052 CBC and differential [[id number]]  (Abnormal) Collected: 07/31/21 2032    Lab Status: Final result Specimen: Blood from Arm, Right Updated: 07/31/21 2049     WBC 3 90 Thousand/uL      RBC 4 88 Million/uL      Hemoglobin 12 8 g/dL      Hematocrit 38 6 %      MCV 79 fL      MCH 26 2 pg      MCHC 33 1 g/dL      RDW 14 5 %      MPV 8 7 fL      Platelets 888 Thousands/uL      Neutrophils Relative 71 %      Lymphocytes Relative 12 %      Monocytes Relative 16 %      Eosinophils Relative 0 %      Basophils Relative 1 %      Neutrophils Absolute 2 80 Thousands/µL      Lymphocytes Absolute 0 50 Thousands/µL      Monocytes Absolute 0 60 Thousand/µL      Eosinophils Absolute 0 00 Thousand/µL      Basophils Absolute 0 00 Thousands/µL     Procalcitonin with AM Reflex [[id number]] Collected: 07/31/21 2032    Lab Status: In process Specimen: Blood from Arm, Right Updated: 07/31/21 2043                 CT chest abdomen pelvis wo contrast   Final Result by Azeem Patel MD (07/31 2312)      No acute intra-abdominal abnormality  No free air or free fluid  Scattered colonic diverticulosis with no inflammatory changes present to suggest acute diverticulitis  No infiltrate or pleural effusion  Stable postoperative changes of prior left nephrectomy  Workstation performed: [id number]         XR chest portable    (Results Pending)              Procedures  Procedures         ED Course  ED Course as of Aug 01 1320   Sat Jul 31, 2021   2213 Possible UTI, will continue with CT scan  Sign out to Dr Barbra Fay pending CT scan                                 SBIRT 20yo+      Most Recent Value   SBIRT (23 yo +)   In order to provide better care to our patients, we are screening all of our patients for alcohol and drug use  Would it be okay to ask you these screening questions?   Unable to answer at this time Filed at: 07/31/2021 2221                    [place]  Number of Diagnoses or Management Options  Diagnosis management comments: Dyspnea on exertion, cough yesterday but not today, and fever concerning for possible pneumonia, however with lack of coughing today this diagnosis is less likely  Still with fever and tachypnea in the ER benefits of starting broad-spectrum antibiotics for this cause outweigh the risks  This also could be COVID  Has no abdominal pain or tenderness  Amount and/or Complexity of Data Reviewed  Review and summarize past medical records: yes  Independent visualization of images, tracings, or specimens: yes    Risk of Complications, Morbidity, and/or Mortality  Presenting problems: high  Diagnostic procedures: minimal  Management options: high        Disposition  Final diagnoses:   UTI (urinary tract infection)     Time reflects when diagnosis was documented in both MDM as applicable and the Disposition within this note     Time User Action Codes Description Comment    7/31/2021 10:13 PM Janelle Schilder Add [N39 0] UTI (urinary tract infection)       ED Disposition     ED Disposition Condition Date/Time Comment    Discharge Stable Sat Jul 31, 2021 11:21  [address] discharge to home/self care              Follow-up Information     Follow up With Specialties Details Why Contact Info Additional [address]  Emergency Department Emergency Medicine  If symptoms worsen 0 [address]  Emergency Department    Gabby Gregory MD Urology Schedule an appointment as soon as possible for a visit   [address]  [phone]             Discharge Medication List as of 7/31/2021 11:22 PM      START taking these medications    Details   cephalexin (KEFLEX) 500 mg capsule Take 1 capsule (500 mg total) by mouth every 6 (six) hours for 7 days, Starting Sun 8/1/2021, Until Sun 8/8/2021, Normal         CONTINUE these medications which have NOT CHANGED    Details albuterol (VENTOLIN HFA) 90 mcg/act inhaler Inhale 2 puffs every 4 (four) hours as needed, Starting Thu 2/2/2017, Historical Med      amLODIPine (NORVASC) 10 mg tablet Starting Mon 6/25/2018, Historical Med      aspirin 81 mg chewable tablet Chew 81 mg, Historical Med      atorvastatin (LIPITOR) 80 mg tablet Take by mouth, Historical Med      cholecalciferol (VITAMIN D3) 1,000 units tablet Take 1 tablet (1,000 Units total) by mouth daily, Starting Thu 7/1/2021, Normal      diclofenac sodium (VOLTAREN) 1 % apply 2 grams to affected area four times a day, Historical Med      fenofibrate (TRICOR) 54 MG tablet Take 54 mg by mouth daily, Historical Med      glucose blood (OneTouch Verio) test strip Use to test blood sugar three times daily  , Normal      hydrochlorothiazide (HYDRODIURIL) 25 mg tablet Starting Sun 5/27/2018, Historical Med      hydrocortisone 2 5 % cream as needed , Starting Thu 3/11/2021, Historical Med      insulin glargine (Toujeo SoloStar) 300 units/mL CONCETRATED U-300 injection pen (1-unit dial) Inject 60 units daily  , Normal      isosorbide mononitrate (IMDUR) 60 mg 24 hr tablet Take 60 mg by mouth Take 1/2 tablet by mouth bid, Historical Med      linaGLIPtin (Tradjenta) 5 MG TABS Take 5 mg by mouth daily, Starting Mon 1/11/2021, Normal      MAGNESIUM PO Take 500 mg by mouth, Historical Med      metoprolol tartrate (LOPRESSOR) 50 mg tablet Take 50 mg by mouth every 12 (twelve) hours, Historical Med      Multiple Vitamin (MULTIVITAMIN ADULT PO) Take by mouth, Historical Med      nateglinide (STARLIX) 120 mg tablet Take 1 tablet (120 mg total) by mouth 3 (three) times a day before meals, Starting Mon 1/4/2021, Normal      nitroglycerin (NITROSTAT) 0 4 mg SL tablet Starting Tue 5/22/2018, Historical Med      OneTouch Delica Lancets 78T MISC Use to test blood sugar three times daily  , Normal      pioglitazone (ACTOS) 15 mg tablet Take 1 tablet (15 mg total) by mouth daily, Starting Mon 1/4/2021, Normal      rivaroxaban (Xarelto) 2 5 mg tablet Take 2 5 mg by mouth 2 (two) times a day , Historical Med      sertraline (ZOLOFT) 50 mg tablet Take 1 tablet (50 mg total) by mouth daily, Starting Mon 1/4/2021, Normal      traZODone (DESYREL) 100 mg tablet Starting Fri 7/20/2018, Historical Med           No discharge procedures on file      PDMP Review     None          ED Provider  Electronically Signed by           Satnam Summers DO  07/31/21 3530       Satnam Summers DO  08/01/21 3555

## 2021-08-04 LAB
ATRIAL RATE: 73 BPM
P AXIS: 71 DEGREES
PR INTERVAL: 206 MS
QRS AXIS: -41 DEGREES
QRSD INTERVAL: 92 MS
QT INTERVAL: 418 MS
QTC INTERVAL: 460 MS
T WAVE AXIS: 93 DEGREES
VENTRICULAR RATE: 73 BPM

## 2021-08-04 PROCEDURE — 93010 ELECTROCARDIOGRAM REPORT: CPT | Performed by: INTERNAL MEDICINE

## 2021-08-06 LAB
BACTERIA BLD CULT: NORMAL
BACTERIA BLD CULT: NORMAL

## 2021-08-11 ENCOUNTER — OFFICE VISIT (OUTPATIENT)
Dept: ENDOCRINOLOGY | Facility: CLINIC | Age: 66
End: 2021-08-11
Payer: MEDICARE

## 2021-08-11 VITALS
SYSTOLIC BLOOD PRESSURE: 126 MMHG | DIASTOLIC BLOOD PRESSURE: 68 MMHG | WEIGHT: 260.4 LBS | HEIGHT: 70 IN | OXYGEN SATURATION: 97 % | HEART RATE: 60 BPM | BODY MASS INDEX: 37.28 KG/M2

## 2021-08-11 DIAGNOSIS — E78.00 PURE HYPERCHOLESTEROLEMIA: ICD-10-CM

## 2021-08-11 DIAGNOSIS — E11.22 TYPE 2 DIABETES MELLITUS WITH STAGE 4 CHRONIC KIDNEY DISEASE, WITH LONG-TERM CURRENT USE OF INSULIN (HCC): Primary | ICD-10-CM

## 2021-08-11 DIAGNOSIS — E08.22 DIABETES DUE TO UNDRL COND W DIABETIC CHRONIC KIDNEY DISEASE (HCC): ICD-10-CM

## 2021-08-11 DIAGNOSIS — I10 ESSENTIAL HYPERTENSION: ICD-10-CM

## 2021-08-11 DIAGNOSIS — N18.4 TYPE 2 DIABETES MELLITUS WITH STAGE 4 CHRONIC KIDNEY DISEASE, WITH LONG-TERM CURRENT USE OF INSULIN (HCC): Primary | ICD-10-CM

## 2021-08-11 DIAGNOSIS — E55.9 VITAMIN D DEFICIENCY: ICD-10-CM

## 2021-08-11 DIAGNOSIS — Z79.4 TYPE 2 DIABETES MELLITUS WITH STAGE 4 CHRONIC KIDNEY DISEASE, WITH LONG-TERM CURRENT USE OF INSULIN (HCC): Primary | ICD-10-CM

## 2021-08-11 LAB — SL AMB POCT HEMOGLOBIN AIC: 8.5 (ref ?–6.5)

## 2021-08-11 PROCEDURE — 83036 HEMOGLOBIN GLYCOSYLATED A1C: CPT | Performed by: NURSE PRACTITIONER

## 2021-08-11 PROCEDURE — 99214 OFFICE O/P EST MOD 30 MIN: CPT | Performed by: NURSE PRACTITIONER

## 2021-08-11 NOTE — PATIENT INSTRUCTIONS
1  Inject 0 25 mg once weekly for 4 weeks then increase to 0 5 mg once weekly  2  Start testing your blood sugar twice daily  Once in the morning and once before bed

## 2021-08-11 NOTE — PROGRESS NOTES
Established Patient Progress Note      Chief Complaint   Patient presents with    Diabetes Type 2        History of Present Illness:   Alicia Ramirez is a 74 y o  male with hypertension, hyperlipidemia, coronary artery disease, chronic kidney disease stage 4 status post left nephrectomy for renal cell cancer in 2017, and type 2 diabetes presents to the office today for follow-up  Denies recent severe hypoglycemic or severe hyperglycemic episodes  Denies any issues with his current regimen  home glucose monitoring: are performed regularly, once daily    At patient's last appointment on 4/8/2021, it was recommended that he discontinue Tradjenta and start ozempic  Patient and his wife were concerned over cost and wished to finish the supply of tradjenta that they obtained  They did not contact office since that visit  Patient has not been checking his blood sugars as frequently  He is recovering from a UTI that required an ED visit on 7/31/2021  Home blood glucose readings: per patient report  Before breakfast: 140-150       Current regimen:   Tradjenta 5 mg  Nateglinide 120 mg TID AC  pioglitizone 15 mg daily  Toujeo 60 units daily    Last Eye Exam: 11/2020  Last Foot Exam: UTD      For hyperlipidemia, he is taking 80 mg of atorvastatin daily  He is also taking 54 mg of fenofibrate  He denies myalgias  For hypertension, he is taking 10 mg of amlodipine, 25 mg of hydrochlorothiazide, 60 mg of isosorbide mononitrate, and 50 mg of metoprolol b i d  He denies headache, pedal edema, and orthostatic hypotension  For vitamin-D deficiency, he is taking 1000 units vitamin-D daily      Patient Active Problem List   Diagnosis    Acquired absence of kidney    Pure hypercholesterolemia    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 ([facility] [address] )    Hypertension    SOB (shortness of breath)    Hearing loss due to cerumen impaction, bilateral    CKD (chronic kidney disease), stage IV ([address] )    Hypertensive chronic kidney disease with stage 1 through stage 4 chronic kidney disease, or unspecified chronic kidney disease    Coronary artery disease involving native coronary artery without angina pectoris    Degeneration of lumbosacral intervertebral disc    Lumbar radiculopathy    Nephrolithiasis    Diabetes mellitus (HCC)    Chronic kidney disease-mineral and bone disorder    Severe obesity (BMI >= 40) ([facility])    Vitamin D deficiency    Elevated uric acid in blood    Chronic knee pain      Past Medical History:   Diagnosis Date    Angina pectoris, unspecified ([place] [address] )     Cancer of kidney ([place] [address] ) 2019    Chronic kidney disease, stage 2 (mild)     [facility], [facility]  )     Diabetes mellitus ([place] [address] )     Essential hypertension     Hypercholesterolemia     Hypertension     Nephrolithiasis     calcium oxalate - had lithotripsy     Seasonal allergies     Stage 3 chronic kidney disease ([place] [address] ) 11/14/2019    Type 2 diabetes mellitus ([facility])     Type II diabetes mellitus, uncontrolled ([facility])       Past Surgical History:   Procedure Laterality Date    ARTHROSCOPY KNEE Bilateral     CARDIAC SURGERY      balloon procedure and stent placement     FOOT SURGERY Right     implants    LAMINECTOMY      NEPHRECTOMY Left 06/2019    PARTIAL HIP ARTHROPLASTY Right     ROTATOR CUFF REPAIR      SHOULDER ARTHROSCOPY Right     SHOULDER SURGERY Left     removal of bone spur      Family History   Problem Relation Age of Onset    Diabetes Mother     Heart attack Father     Heart disease Father      Social History     Tobacco Use    Smoking status: Never Smoker    Smokeless tobacco: Never Used   Substance Use Topics    Alcohol use: No     Allergies   Allergen Reactions    Percocet [Oxycodone-Acetaminophen]     Tetracycline          Current Outpatient Medications:     albuterol (VENTOLIN HFA) 90 mcg/act inhaler, Inhale 2 puffs every 4 (four) hours as needed, Disp: , Rfl:     amLODIPine (NORVASC) 10 mg tablet, , Disp: , Rfl:     aspirin 81 mg chewable tablet, Chew 81 mg, Disp: , Rfl:     atorvastatin (LIPITOR) 80 mg tablet, Take by mouth, Disp: , Rfl:     cholecalciferol (VITAMIN D3) 1,000 units tablet, Take 1 tablet (1,000 Units total) by mouth daily, Disp: 90 tablet, Rfl: 1    diclofenac sodium (VOLTAREN) 1 %, apply 2 grams to affected area four times a day, Disp: , Rfl:     fenofibrate (TRICOR) 54 MG tablet, Take 54 mg by mouth daily, Disp: , Rfl:     glucose blood (OneTouch Verio) test strip, Use to test blood sugar three times daily  , Disp: 200 each, Rfl: 3    hydrochlorothiazide (HYDRODIURIL) 25 mg tablet, , Disp: , Rfl:     hydrocortisone 2 5 % cream, as needed , Disp: , Rfl:     insulin glargine (Toujeo SoloStar) 300 units/mL CONCETRATED U-300 injection pen (1-unit dial), Inject 60 units daily  , Disp: 12 pen, Rfl: 1    isosorbide mononitrate (IMDUR) 60 mg 24 hr tablet, Take 60 mg by mouth Take 1/2 tablet by mouth bid, Disp: , Rfl:     MAGNESIUM PO, Take 500 mg by mouth, Disp: , Rfl:     metoprolol tartrate (LOPRESSOR) 50 mg tablet, Take 50 mg by mouth every 12 (twelve) hours, Disp: , Rfl:     Multiple Vitamin (MULTIVITAMIN ADULT PO), Take by mouth, Disp: , Rfl:     nateglinide (STARLIX) 120 mg tablet, Take 1 tablet (120 mg total) by mouth 3 (three) times a day before meals, Disp: 270 tablet, Rfl: 1    nitroglycerin (NITROSTAT) 0 4 mg SL tablet, , Disp: , Rfl:     OneTouch Delica Lancets 35Q MISC, Use to test blood sugar three times daily  , Disp: 200 each, Rfl: 3    pioglitazone (ACTOS) 15 mg tablet, Take 1 tablet (15 mg total) by mouth daily, Disp: 90 tablet, Rfl: 1    rivaroxaban (Xarelto) 2 5 mg tablet, Take 2 5 mg by mouth 2 (two) times a day , Disp: , Rfl:     sertraline (ZOLOFT) 50 mg tablet, Take 1 tablet (50 mg total) by mouth daily, Disp: 90 tablet, Rfl: 1    traZODone (DESYREL) 100 mg tablet, , Disp: , Rfl:     Semaglutide,0 25 or 0 5MG/DOS, 2 MG/1 5ML SOPN, Inject 0 25 mg once weekly for 4 weeks then increase to 0 5 mg once weekly  , Disp: 3 mL, Rfl: 1    Review of Systems   Constitutional: Positive for fatigue  Negative for activity change, appetite change and unexpected weight change  HENT: Negative for sore throat, trouble swallowing and voice change  Eyes: Negative for visual disturbance  Respiratory: Negative for cough, chest tightness and shortness of breath  Cardiovascular: Negative for chest pain, palpitations and leg swelling  Gastrointestinal: Negative for constipation, diarrhea, nausea and vomiting  Endocrine: Negative for polydipsia, polyphagia and polyuria  Genitourinary: Negative for frequency  Musculoskeletal: Positive for arthralgias and back pain  Negative for gait problem, joint swelling, myalgias, neck pain and neck stiffness  Skin: Negative for wound  Allergic/Immunologic: Positive for environmental allergies  Negative for food allergies  Neurological: Positive for numbness  Negative for dizziness, weakness, light-headedness and headaches  Hematological: Does not bruise/bleed easily  Psychiatric/Behavioral: Negative for decreased concentration, dysphoric mood and sleep disturbance  The patient is not nervous/anxious  Physical Exam:  Body mass index is 37 36 kg/m²  /68 (BP Location: Right arm, Cuff Size: Adult)   Pulse 60   Ht 5' 10" (1 778 m)   Wt 118 kg (260 lb 6 4 oz)   SpO2 97%   BMI 37 36 kg/m²    Wt Readings from Last 3 Encounters:   08/11/21 118 kg (260 lb 6 4 oz)   07/31/21 118 kg (260 lb)   07/31/21 120 kg (264 lb)       Physical Exam  Vitals reviewed  Constitutional:       General: He is not in acute distress  Appearance: He is well-developed  He is obese  He is not ill-appearing  HENT:      Head: Normocephalic and atraumatic  Comments: Mask in place  Eyes:      Pupils: Pupils are equal, round, and reactive to light  Neck:      Thyroid: No thyromegaly     Cardiovascular:      Rate and Rhythm: Normal rate and regular rhythm  Pulses: Normal pulses  Heart sounds: Normal heart sounds  Pulmonary:      Effort: Pulmonary effort is normal       Breath sounds: Normal breath sounds  Abdominal:      General: Bowel sounds are normal  There is no distension  Palpations: Abdomen is soft  Tenderness: There is no abdominal tenderness  Musculoskeletal:      Cervical back: Normal range of motion and neck supple  Right lower leg: No edema  Left lower leg: No edema  Lymphadenopathy:      Cervical: No cervical adenopathy  Skin:     General: Skin is warm and dry  Capillary Refill: Capillary refill takes less than 2 seconds  Neurological:      Mental Status: He is alert and oriented to person, place, and time        Gait: Gait normal    Psychiatric:         Mood and Affect: Mood normal          Behavior: Behavior normal            Labs:   Lab Results   Component Value Date    HGBA1C 8 5 (A) 08/11/2021    HGBA1C 8 1 (H) 03/23/2021    HGBA1C 7 4 (H) 01/09/2021     Lab Results   Component Value Date    CREATININE 2 74 (H) 07/31/2021    CREATININE 2 41 (H) 03/16/2020    CREATININE 2 53 (H) 01/31/2020    BUN 32 (H) 07/31/2021     06/19/2018    K 3 9 07/31/2021    CL 98 07/31/2021    CO2 28 07/31/2021     eGFR   Date Value Ref Range Status   07/31/2021 23 ml/min/1 73sq m Final     Lab Results   Component Value Date    CHOL 140 05/15/2018    HDL 35 (L) 03/16/2020    TRIG 136 03/16/2020     Lab Results   Component Value Date    ALT 43 07/31/2021    AST 48 (H) 07/31/2021    ALKPHOS 37 (L) 07/31/2021    BILITOT 0 7 04/11/2018     Lab Results   Component Value Date    RSJ7XAXYLJDU 2 740 05/06/2019     No results found for: FREET4, TSI    Impression & Plan:    Problem List Items Addressed This Visit        Endocrine    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 ([facility] [address] )    Relevant Medications    Semaglutide,0 25 or 0 5MG/DOS, 2 MG/1 5ML SOPN    Other Relevant Orders POCT hemoglobin A1c (Completed)    Diabetes mellitus ([facility] [address] ) - Primary      Patient is poorly controlled  Instructed him to start testing his blood sugars at least twice daily and documenting them for review  Discontinue Tradjenta and start Ozempic  Reviewed mechanism of action as well as potential side effects including nausea, vomiting, and GI upset  Patient denies any personal history of pancreatitis  He denies history of medullary thyroid cancer or MEN2  He will benefit from weight loss and added cardiovascular protection  Encouraged patient to increase physical activity as tolerated and continue to focus on a healthy diet  Lab Results   Component Value Date    HGBA1C 8 5 (A) 08/11/2021            Relevant Medications    Semaglutide,0 25 or 0 5MG/DOS, 2 MG/1 5ML SOPN    Other Relevant Orders    HEMOGLOBIN A1C W/ EAG ESTIMATION Lab Collect    Comprehensive metabolic panel Lab Collect       Cardiovascular and Mediastinum    Hypertension       BP stable 126/68  Continue current regimen  Other    Pure hypercholesterolemia       Continue statin  Vitamin D deficiency       Continue vitamin-D supplements  Orders Placed This Encounter   Procedures    HEMOGLOBIN A1C W/ EAG ESTIMATION Lab Collect     Standing Status:   Future     Standing Expiration Date:   8/11/2022    Comprehensive metabolic panel Lab Collect     This is a patient instruction: Patient fasting for 8 hours or longer recommended  Standing Status:   Future     Standing Expiration Date:   8/11/2022    POCT hemoglobin A1c       Patient Instructions   1  Inject 0 25 mg once weekly for 4 weeks then increase to 0 5 mg once weekly  2  Start testing your blood sugar twice daily  Once in the morning and once before bed  Discussed with the patient and all questioned fully answered  He will call me if any problems arise  Follow-up appointment in 4 months       Counseled patient on diagnostic results, prognosis, risk and benefit of treatment options, instruction for management, importance of treatment compliance, Risk  factor reduction and impressions    TORREY Flanagan

## 2021-08-12 PROBLEM — E11.9 DIABETES MELLITUS (HCC): Status: ACTIVE | Noted: 2021-02-10

## 2021-08-12 PROBLEM — G89.29 CHRONIC KNEE PAIN: Status: ACTIVE | Noted: 2018-10-18

## 2021-08-12 PROBLEM — M25.569 CHRONIC KNEE PAIN: Status: ACTIVE | Noted: 2018-10-18

## 2021-08-12 NOTE — ASSESSMENT & PLAN NOTE
Patient is poorly controlled  Instructed him to start testing his blood sugars at least twice daily and documenting them for review  Discontinue Tradjenta and start Ozempic  Reviewed mechanism of action as well as potential side effects including nausea, vomiting, and GI upset  Patient denies any personal history of pancreatitis  He denies history of medullary thyroid cancer or MEN2  He will benefit from weight loss and added cardiovascular protection  Encouraged patient to increase physical activity as tolerated and continue to focus on a healthy diet    Lab Results   Component Value Date    HGBA1C 8 5 (A) 08/11/2021

## 2021-08-26 DIAGNOSIS — N18.4 TYPE 2 DIABETES MELLITUS WITH STAGE 4 CHRONIC KIDNEY DISEASE, WITH LONG-TERM CURRENT USE OF INSULIN (HCC): ICD-10-CM

## 2021-08-26 DIAGNOSIS — E11.22 TYPE 2 DIABETES MELLITUS WITH STAGE 4 CHRONIC KIDNEY DISEASE, WITH LONG-TERM CURRENT USE OF INSULIN (HCC): ICD-10-CM

## 2021-08-26 DIAGNOSIS — Z79.4 TYPE 2 DIABETES MELLITUS WITH STAGE 4 CHRONIC KIDNEY DISEASE, WITH LONG-TERM CURRENT USE OF INSULIN (HCC): ICD-10-CM

## 2021-08-26 RX ORDER — INSULIN GLARGINE 300 U/ML
INJECTION, SOLUTION SUBCUTANEOUS
Qty: 18 ML | Refills: 1 | Status: SHIPPED | OUTPATIENT
Start: 2021-08-26 | End: 2021-12-21

## 2021-09-09 DIAGNOSIS — F32.A DEPRESSION, UNSPECIFIED DEPRESSION TYPE: ICD-10-CM

## 2021-09-09 DIAGNOSIS — E08.22 DIABETES DUE TO UNDRL COND W DIABETIC CHRONIC KIDNEY DISEASE (HCC): ICD-10-CM

## 2021-09-09 RX ORDER — PIOGLITAZONEHYDROCHLORIDE 15 MG/1
15 TABLET ORAL DAILY
Qty: 90 TABLET | Refills: 1 | Status: SHIPPED | OUTPATIENT
Start: 2021-09-09 | End: 2021-09-13 | Stop reason: SDUPTHER

## 2021-09-13 DIAGNOSIS — E08.22 DIABETES DUE TO UNDRL COND W DIABETIC CHRONIC KIDNEY DISEASE (HCC): ICD-10-CM

## 2021-09-13 RX ORDER — PIOGLITAZONEHYDROCHLORIDE 15 MG/1
15 TABLET ORAL DAILY
Qty: 90 TABLET | Refills: 1 | Status: SHIPPED | OUTPATIENT
Start: 2021-09-13 | End: 2021-12-21

## 2021-09-20 ENCOUNTER — HOSPITAL ENCOUNTER (OUTPATIENT)
Dept: CT IMAGING | Facility: HOSPITAL | Age: 66
Discharge: HOME | End: 2021-09-20
Payer: MEDICARE

## 2021-09-20 ENCOUNTER — APPOINTMENT (OUTPATIENT)
Dept: LAB | Facility: HOSPITAL | Age: 66
End: 2021-09-20
Payer: MEDICARE

## 2021-09-20 DIAGNOSIS — C64.2 MALIGNANT NEOPLASM OF LEFT KIDNEY, EXCEPT RENAL PELVIS (HCC): ICD-10-CM

## 2021-09-20 DIAGNOSIS — N20.0 CALCULUS OF KIDNEY: ICD-10-CM

## 2021-09-20 LAB
ANION GAP SERPL CALCULATED.3IONS-SCNC: 8 MMOL/L (ref 4–13)
BUN SERPL-MCNC: 37 MG/DL (ref 7–25)
CALCIUM SERPL-MCNC: 9.5 MG/DL (ref 8.6–10.5)
CHLORIDE SERPL-SCNC: 102 MMOL/L (ref 98–107)
CO2 SERPL-SCNC: 29 MMOL/L (ref 21–31)
CREAT SERPL-MCNC: 2.37 MG/DL (ref 0.7–1.3)
GFR SERPL CREATININE-BSD FRML MDRD: 27 ML/MIN/1.73SQ M
GLUCOSE P FAST SERPL-MCNC: 146 MG/DL (ref 65–99)
POTASSIUM SERPL-SCNC: 4.2 MMOL/L (ref 3.5–5.5)
PSA SERPL-MCNC: 0.8 NG/ML (ref 0–4)
SODIUM SERPL-SCNC: 139 MMOL/L (ref 134–143)

## 2021-09-20 PROCEDURE — 36415 COLL VENOUS BLD VENIPUNCTURE: CPT

## 2021-09-20 PROCEDURE — G0103 PSA SCREENING: HCPCS

## 2021-09-20 PROCEDURE — 74176 CT ABD & PELVIS W/O CONTRAST: CPT

## 2021-09-20 PROCEDURE — G1004 CDSM NDSC: HCPCS

## 2021-09-20 PROCEDURE — 80048 BASIC METABOLIC PNL TOTAL CA: CPT

## 2021-09-27 ENCOUNTER — HOSPITAL ENCOUNTER (OUTPATIENT)
Dept: MRI IMAGING | Facility: HOSPITAL | Age: 66
Discharge: HOME | End: 2021-09-27
Payer: MEDICARE

## 2021-09-27 DIAGNOSIS — M54.2 PAIN, NECK: ICD-10-CM

## 2021-09-27 PROCEDURE — G1004 CDSM NDSC: HCPCS

## 2021-09-27 PROCEDURE — 72141 MRI NECK SPINE W/O DYE: CPT

## 2021-10-01 ENCOUNTER — TELEPHONE (OUTPATIENT)
Dept: DIABETES SERVICES | Facility: CLINIC | Age: 66
End: 2021-10-01

## 2021-11-16 ENCOUNTER — OFFICE VISIT (OUTPATIENT)
Dept: NEPHROLOGY | Facility: CLINIC | Age: 66
End: 2021-11-16
Payer: MEDICARE

## 2021-11-16 VITALS
HEART RATE: 70 BPM | HEIGHT: 70 IN | WEIGHT: 248.4 LBS | SYSTOLIC BLOOD PRESSURE: 112 MMHG | BODY MASS INDEX: 35.56 KG/M2 | OXYGEN SATURATION: 97 % | DIASTOLIC BLOOD PRESSURE: 70 MMHG

## 2021-11-16 DIAGNOSIS — I10 PRIMARY HYPERTENSION: ICD-10-CM

## 2021-11-16 DIAGNOSIS — M89.9 CHRONIC KIDNEY DISEASE-MINERAL AND BONE DISORDER: ICD-10-CM

## 2021-11-16 DIAGNOSIS — N18.9 CHRONIC KIDNEY DISEASE-MINERAL AND BONE DISORDER: ICD-10-CM

## 2021-11-16 DIAGNOSIS — E08.22 DIABETES DUE TO UNDRL COND W DIABETIC CHRONIC KIDNEY DISEASE (HCC): ICD-10-CM

## 2021-11-16 DIAGNOSIS — E83.9 CHRONIC KIDNEY DISEASE-MINERAL AND BONE DISORDER: ICD-10-CM

## 2021-11-16 DIAGNOSIS — N20.0 NEPHROLITHIASIS: ICD-10-CM

## 2021-11-16 DIAGNOSIS — N18.4 CKD (CHRONIC KIDNEY DISEASE), STAGE IV (HCC): Primary | ICD-10-CM

## 2021-11-16 DIAGNOSIS — E78.00 PURE HYPERCHOLESTEROLEMIA: ICD-10-CM

## 2021-11-16 DIAGNOSIS — I12.9 HYPERTENSIVE CHRONIC KIDNEY DISEASE WITH STAGE 1 THROUGH STAGE 4 CHRONIC KIDNEY DISEASE, OR UNSPECIFIED CHRONIC KIDNEY DISEASE: ICD-10-CM

## 2021-11-16 PROCEDURE — 99214 OFFICE O/P EST MOD 30 MIN: CPT | Performed by: INTERNAL MEDICINE

## 2021-12-06 DIAGNOSIS — N18.4 TYPE 2 DIABETES MELLITUS WITH STAGE 4 CHRONIC KIDNEY DISEASE, WITH LONG-TERM CURRENT USE OF INSULIN (HCC): ICD-10-CM

## 2021-12-06 DIAGNOSIS — Z79.4 TYPE 2 DIABETES MELLITUS WITH STAGE 4 CHRONIC KIDNEY DISEASE, WITH LONG-TERM CURRENT USE OF INSULIN (HCC): ICD-10-CM

## 2021-12-06 DIAGNOSIS — E11.22 TYPE 2 DIABETES MELLITUS WITH STAGE 4 CHRONIC KIDNEY DISEASE, WITH LONG-TERM CURRENT USE OF INSULIN (HCC): ICD-10-CM

## 2021-12-09 ENCOUNTER — TELEPHONE (OUTPATIENT)
Dept: ENDOCRINOLOGY | Facility: CLINIC | Age: 66
End: 2021-12-09

## 2021-12-13 DIAGNOSIS — E11.9 TYPE 2 DIABETES MELLITUS WITHOUT COMPLICATION, UNSPECIFIED WHETHER LONG TERM INSULIN USE (HCC): ICD-10-CM

## 2021-12-13 LAB
LEFT EYE DIABETIC RETINOPATHY: NORMAL
RIGHT EYE DIABETIC RETINOPATHY: NORMAL

## 2021-12-14 RX ORDER — NATEGLINIDE 120 MG/1
120 TABLET ORAL
Qty: 270 TABLET | Refills: 1 | Status: SHIPPED | OUTPATIENT
Start: 2021-12-14 | End: 2022-03-31

## 2021-12-20 ENCOUNTER — TELEPHONE (OUTPATIENT)
Dept: PULMONOLOGY | Facility: CLINIC | Age: 66
End: 2021-12-20

## 2021-12-21 ENCOUNTER — OFFICE VISIT (OUTPATIENT)
Dept: ENDOCRINOLOGY | Facility: CLINIC | Age: 66
End: 2021-12-21
Payer: MEDICARE

## 2021-12-21 VITALS
DIASTOLIC BLOOD PRESSURE: 60 MMHG | SYSTOLIC BLOOD PRESSURE: 118 MMHG | WEIGHT: 245 LBS | HEIGHT: 70 IN | RESPIRATION RATE: 16 BRPM | HEART RATE: 76 BPM | BODY MASS INDEX: 35.07 KG/M2 | TEMPERATURE: 97.7 F

## 2021-12-21 DIAGNOSIS — N18.4 TYPE 2 DIABETES MELLITUS WITH STAGE 4 CHRONIC KIDNEY DISEASE, WITH LONG-TERM CURRENT USE OF INSULIN (HCC): Primary | ICD-10-CM

## 2021-12-21 DIAGNOSIS — E55.9 VITAMIN D DEFICIENCY: ICD-10-CM

## 2021-12-21 DIAGNOSIS — E11.22 TYPE 2 DIABETES MELLITUS WITH STAGE 4 CHRONIC KIDNEY DISEASE, WITH LONG-TERM CURRENT USE OF INSULIN (HCC): Primary | ICD-10-CM

## 2021-12-21 DIAGNOSIS — I10 PRIMARY HYPERTENSION: ICD-10-CM

## 2021-12-21 DIAGNOSIS — Z79.4 TYPE 2 DIABETES MELLITUS WITH STAGE 4 CHRONIC KIDNEY DISEASE, WITH LONG-TERM CURRENT USE OF INSULIN (HCC): Primary | ICD-10-CM

## 2021-12-21 PROBLEM — M54.2 NECK PAIN: Status: ACTIVE | Noted: 2021-12-21

## 2021-12-21 PROCEDURE — 99214 OFFICE O/P EST MOD 30 MIN: CPT | Performed by: NURSE PRACTITIONER

## 2021-12-21 RX ORDER — INSULIN GLARGINE 300 U/ML
30 INJECTION, SOLUTION SUBCUTANEOUS DAILY
Qty: 9 ML | Refills: 1
Start: 2021-12-21 | End: 2021-12-27 | Stop reason: SDUPTHER

## 2021-12-24 ENCOUNTER — OFFICE VISIT (OUTPATIENT)
Dept: URGENT CARE | Facility: CLINIC | Age: 66
End: 2021-12-24
Payer: MEDICARE

## 2021-12-24 VITALS
OXYGEN SATURATION: 97 % | TEMPERATURE: 97.6 F | RESPIRATION RATE: 18 BRPM | SYSTOLIC BLOOD PRESSURE: 133 MMHG | HEART RATE: 67 BPM | DIASTOLIC BLOOD PRESSURE: 71 MMHG

## 2021-12-24 DIAGNOSIS — H61.21 IMPACTED CERUMEN OF RIGHT EAR: Primary | ICD-10-CM

## 2021-12-24 PROCEDURE — 69210 REMOVE IMPACTED EAR WAX UNI: CPT | Performed by: PHYSICIAN ASSISTANT

## 2021-12-24 PROCEDURE — 99213 OFFICE O/P EST LOW 20 MIN: CPT | Performed by: PHYSICIAN ASSISTANT

## 2021-12-24 PROCEDURE — G0463 HOSPITAL OUTPT CLINIC VISIT: HCPCS | Performed by: PHYSICIAN ASSISTANT

## 2021-12-27 DIAGNOSIS — Z79.4 TYPE 2 DIABETES MELLITUS WITH STAGE 4 CHRONIC KIDNEY DISEASE, WITH LONG-TERM CURRENT USE OF INSULIN (HCC): ICD-10-CM

## 2021-12-27 DIAGNOSIS — E11.22 TYPE 2 DIABETES MELLITUS WITH STAGE 4 CHRONIC KIDNEY DISEASE, WITH LONG-TERM CURRENT USE OF INSULIN (HCC): ICD-10-CM

## 2021-12-27 DIAGNOSIS — N18.4 STAGE 4 CHRONIC KIDNEY DISEASE (HCC): ICD-10-CM

## 2021-12-27 DIAGNOSIS — N18.4 TYPE 2 DIABETES MELLITUS WITH STAGE 4 CHRONIC KIDNEY DISEASE, WITH LONG-TERM CURRENT USE OF INSULIN (HCC): ICD-10-CM

## 2021-12-27 RX ORDER — MELATONIN
1000 DAILY
Qty: 90 TABLET | Refills: 1 | Status: SHIPPED | OUTPATIENT
Start: 2021-12-27

## 2021-12-28 RX ORDER — INSULIN GLARGINE 300 U/ML
30 INJECTION, SOLUTION SUBCUTANEOUS DAILY
Qty: 9 ML | Refills: 1 | Status: SHIPPED | OUTPATIENT
Start: 2021-12-28 | End: 2022-06-10 | Stop reason: SDUPTHER

## 2022-01-06 ENCOUNTER — OFFICE VISIT (OUTPATIENT)
Dept: NEPHROLOGY | Facility: CLINIC | Age: 67
End: 2022-01-06
Payer: MEDICARE

## 2022-01-06 VITALS
OXYGEN SATURATION: 97 % | HEART RATE: 65 BPM | DIASTOLIC BLOOD PRESSURE: 60 MMHG | SYSTOLIC BLOOD PRESSURE: 110 MMHG | BODY MASS INDEX: 35.15 KG/M2 | WEIGHT: 245 LBS

## 2022-01-06 DIAGNOSIS — Z79.4 TYPE 2 DIABETES MELLITUS WITH STAGE 4 CHRONIC KIDNEY DISEASE, WITH LONG-TERM CURRENT USE OF INSULIN (HCC): ICD-10-CM

## 2022-01-06 DIAGNOSIS — Z90.5 ACQUIRED ABSENCE OF KIDNEY: ICD-10-CM

## 2022-01-06 DIAGNOSIS — N18.4 TYPE 2 DIABETES MELLITUS WITH STAGE 4 CHRONIC KIDNEY DISEASE, WITH LONG-TERM CURRENT USE OF INSULIN (HCC): ICD-10-CM

## 2022-01-06 DIAGNOSIS — E11.22 TYPE 2 DIABETES MELLITUS WITH STAGE 4 CHRONIC KIDNEY DISEASE, WITH LONG-TERM CURRENT USE OF INSULIN (HCC): ICD-10-CM

## 2022-01-06 DIAGNOSIS — I12.9 HYPERTENSIVE CHRONIC KIDNEY DISEASE WITH STAGE 1 THROUGH STAGE 4 CHRONIC KIDNEY DISEASE, OR UNSPECIFIED CHRONIC KIDNEY DISEASE: ICD-10-CM

## 2022-01-06 DIAGNOSIS — N18.4 CKD (CHRONIC KIDNEY DISEASE), STAGE IV (HCC): Primary | ICD-10-CM

## 2022-01-06 PROCEDURE — 99214 OFFICE O/P EST MOD 30 MIN: CPT | Performed by: INTERNAL MEDICINE

## 2022-01-06 NOTE — ASSESSMENT & PLAN NOTE
Lab Results   Component Value Date    EGFR 27 09/20/2021    EGFR 23 07/31/2021    EGFR 27 03/16/2020    CREATININE 2 37 (H) 09/20/2021    CREATININE 2 74 (H) 07/31/2021    CREATININE 2 41 (H) 03/16/2020       Continue to avoid all in any new medication at this time the blood pressure standpoint, especially RAAS inhibitors  Patient's blood pressure is appropriate

## 2022-01-06 NOTE — ASSESSMENT & PLAN NOTE
Lab Results   Component Value Date    EGFR 27 09/20/2021    EGFR 23 07/31/2021    EGFR 27 03/16/2020    CREATININE 2 37 (H) 09/20/2021    CREATININE 2 74 (H) 07/31/2021    CREATININE 2 41 (H) 03/16/2020       Kidney function now just over 30 mL/ minute for the 1st time in a while  Continue optimize the patient's overall care  He is aware that he will hold hydrochlorothiazide the day the procedure

## 2022-01-06 NOTE — ASSESSMENT & PLAN NOTE
Patient appears have diabetic nephropathy, has proteinuria  Will defer to further evaluate and treat at this time given procedure in the very near future  The patient may ultimately benefit from an SGLT -2 inhibitor or RAAS inhibition if his kidney function can tolerate        Lab Results   Component Value Date    HGBA1C 5 9 (H) 11/10/2021

## 2022-01-06 NOTE — PROGRESS NOTES
[facility] [place], [place]    Name: Carlyle Fay  YOB: 1955      Assessment/Plan:    CKD (chronic kidney disease), stage IV [facility])  Lab Results   Component Value Date    EGFR 27 09/20/2021    EGFR 23 07/31/2021    EGFR 27 03/16/2020    CREATININE 2 37 (H) 09/20/2021    CREATININE 2 74 (H) 07/31/2021    CREATININE 2 41 (H) 03/16/2020       Kidney function now just over 30 mL/ minute for the 1st time in a while  Continue optimize the patient's overall care  He is aware that he will hold hydrochlorothiazide the day the procedure  Hypertensive chronic kidney disease with stage 1 through stage 4 chronic kidney disease, or unspecified chronic kidney disease  Lab Results   Component Value Date    EGFR 27 09/20/2021    EGFR 23 07/31/2021    EGFR 27 03/16/2020    CREATININE 2 37 (H) 09/20/2021    CREATININE 2 74 (H) 07/31/2021    CREATININE 2 41 (H) 03/16/2020       Continue to avoid all in any new medication at this time the blood pressure standpoint, especially RAAS inhibitors  Patient's blood pressure is appropriate  Acquired absence of kidney    Patient's left kidney was removed in June of 2019    Diabetes mellitus [facility])   Patient appears have diabetic nephropathy, has proteinuria  Will defer to further evaluate and treat at this time given procedure in the very near future  The patient may ultimately benefit from an SGLT -2 inhibitor or RAAS inhibition if his kidney function can tolerate  Lab Results   Component Value Date    HGBA1C 5 9 (H) 11/10/2021          Problem List Items Addressed This Visit        Endocrine    Diabetes mellitus ([address] )      Patient appears have diabetic nephropathy, has proteinuria  Will defer to further evaluate and treat at this time given procedure in the very near future  The patient may ultimately benefit from an SGLT -2 inhibitor or RAAS inhibition if his kidney function can tolerate        Lab Results Component Value Date    HGBA1C 5 9 (H) 11/10/2021               Genitourinary    CKD (chronic kidney disease), stage IV [facility]) - Primary     Lab Results   Component Value Date    EGFR 27 09/20/2021    EGFR 23 07/31/2021    EGFR 27 03/16/2020    CREATININE 2 37 (H) 09/20/2021    CREATININE 2 74 (H) 07/31/2021    CREATININE 2 41 (H) 03/16/2020       Kidney function now just over 30 mL/ minute for the 1st time in a while  Continue optimize the patient's overall care  He is aware that he will hold hydrochlorothiazide the day the procedure  Hypertensive chronic kidney disease with stage 1 through stage 4 chronic kidney disease, or unspecified chronic kidney disease     Lab Results   Component Value Date    EGFR 27 09/20/2021    EGFR 23 07/31/2021    EGFR 27 03/16/2020    CREATININE 2 37 (H) 09/20/2021    CREATININE 2 74 (H) 07/31/2021    CREATININE 2 41 (H) 03/16/2020       Continue to avoid all in any new medication at this time the blood pressure standpoint, especially RAAS inhibitors  Patient's blood pressure is appropriate  Other    Acquired absence of kidney       Patient's left kidney was removed in June of 2019               Patient is cleared as low to moderate risk for acute kidney injury with this with a P Jay procedure  He is optimized from medical standpoint  He is aware to hold hydrochlorothiazide the morning of the procedure  Subjective:      Patient ID: Ngoc Dickerson is a 74 y o  male  Patient presents for clearance from the renal standpoint  Patient believes he is in his usual state health eating and drinking normally with no specific issues at this time  He is taking all medications as prescribed including hydrochlorothiazide once daily  Patient denies significant bilateral lower extremity edema other claims that when he does he will have a little more left as opposed to right lower extremity edema  Hypertension  This is a chronic problem   The current episode started more than 1 year ago  The problem is unchanged  The problem is controlled  Pertinent negatives include no chest pain, orthopnea or peripheral edema  There are no associated agents to hypertension  Risk factors for coronary artery disease include diabetes mellitus, male gender and obesity  Past treatments include calcium channel blockers, lifestyle changes and diuretics  There are no compliance problems  Hypertensive end-organ damage includes kidney disease  Identifiable causes of hypertension include chronic renal disease  The following portions of the patient's history were reviewed and updated as appropriate: allergies, current medications, past family history, past medical history, past social history, past surgical history and problem list     Review of Systems   Cardiovascular: Negative for chest pain and orthopnea  All other systems reviewed and are negative          Social History     Socioeconomic History    Marital status: /Civil Union     Spouse name: Not on file    Number of children: Not on file    Years of education: Not on file    Highest education level: Not on file   Occupational History    Occupation: [employer] worker    Tobacco Use    Smoking status: Never Smoker    Smokeless tobacco: Never Used   Vaping Use    Vaping Use: Never used   Substance and Sexual Activity    Alcohol use: No    Drug use: No    Sexual activity: Not on file   Other Topics Concern    Not on file   Social History Narrative    Patient is a former smoker - As per [employer]    Current drug user - As per [employer]    Consumes on average 1 cup of regular coffee per day      Social Determinants of Health     Financial Resource Strain: Not on file   Food Insecurity: Not on file   Transportation Needs: Not on file   Physical Activity: Not on file   Stress: Not on file   Social Connections: Not on file   Intimate Partner Violence: Not on file   Housing Stability: Not on file     Past Medical History: Diagnosis Date    Angina pectoris, unspecified ([facility] )     Cancer of kidney ([place] [id number] ) 2019    Chronic kidney disease, stage 2 (mild)     Chronic kidney disease, stage 3 (HCC)     Diabetes mellitus [facility] )     Essential hypertension     Hypercholesterolemia     Hypertension     Nephrolithiasis     calcium oxalate - had lithotripsy     Seasonal allergies     Stage 3 [facility]  ) 11/14/2019    Type 2 diabetes mellitus (HCC)     Type II diabetes mellitus, uncontrolled (HCC)      Past Surgical History:   Procedure Laterality Date    ARTHROSCOPY KNEE Bilateral     CARDIAC SURGERY      balloon procedure and stent placement     FOOT SURGERY Right     implants    LAMINECTOMY      NEPHRECTOMY Left 06/2019    PARTIAL HIP ARTHROPLASTY Right     ROTATOR CUFF REPAIR      SHOULDER ARTHROSCOPY Right     SHOULDER SURGERY Left     removal of bone spur       Current Outpatient Medications:     albuterol (VENTOLIN HFA) 90 mcg/act inhaler, Inhale 2 puffs every 4 (four) hours as needed, Disp: , Rfl:     amLODIPine (NORVASC) 10 mg tablet, , Disp: , Rfl:     aspirin 81 mg chewable tablet, Chew 81 mg, Disp: , Rfl:     atorvastatin (LIPITOR) 80 mg tablet, Take by mouth, Disp: , Rfl:     cholecalciferol (VITAMIN D3) 1,000 units tablet, Take 1 tablet (1,000 Units total) by mouth daily, Disp: 90 tablet, Rfl: 1    diclofenac sodium (VOLTAREN) 1 %, apply 2 grams to affected area four times a day, Disp: , Rfl:     fenofibrate (TRICOR) 54 MG tablet, Take 54 mg by mouth daily, Disp: , Rfl:     glucose blood (OneTouch Verio) test strip, Use to test blood sugar three times daily  , Disp: 200 each, Rfl: 3    hydrochlorothiazide (HYDRODIURIL) 25 mg tablet, , Disp: , Rfl:     hydrocortisone 2 5 % cream, as needed , Disp: , Rfl:     insulin glargine (Toujeo SoloStar) 300 units/mL CONCENTRATED U-300 injection pen (1-unit dial), Inject 30 Units under the skin daily, Disp: 9 mL, Rfl: 1    isosorbide mononitrate (IMDUR) 60 mg 24 hr tablet, Take 60 mg by mouth Take 1/2 tablet by mouth bid, Disp: , Rfl:     MAGNESIUM PO, Take 500 mg by mouth, Disp: , Rfl:     metoprolol tartrate (LOPRESSOR) 50 mg tablet, Take 50 mg by mouth every 12 (twelve) hours, Disp: , Rfl:     Multiple Vitamin (MULTIVITAMIN ADULT PO), Take by mouth, Disp: , Rfl:     nateglinide (STARLIX) 120 mg tablet, Take 1 tablet (120 mg total) by mouth 3 (three) times a day before meals, Disp: 270 tablet, Rfl: 1    nitroglycerin (NITROSTAT) 0 4 mg SL tablet, , Disp: , Rfl:     OneTouch Delica Lancets 65H MISC, Use to test blood sugar three times daily  , Disp: 200 each, Rfl: 3    rivaroxaban (Xarelto) 2 5 mg tablet, Take 2 5 mg by mouth 2 (two) times a day , Disp: , Rfl:     Semaglutide,0 25 or 0 5MG/DOS, 2 MG/1 5ML SOPN, Inject 0 25 mg once weekly for 4 weeks then increase to 0 5 mg once weekly  , Disp: 3 mL, Rfl: 1    sertraline (ZOLOFT) 50 mg tablet, Take 1 tablet (50 mg total) by mouth daily, Disp: 90 tablet, Rfl: 1    traZODone (DESYREL) 100 mg tablet, , Disp: , Rfl:     Lab Results   Component Value Date     06/19/2018    SODIUM 139 09/20/2021    K 4 2 09/20/2021     09/20/2021    CO2 29 09/20/2021    ANIONGAP 10 3 06/19/2018    AGAP 8 09/20/2021    BUN 37 (H) 09/20/2021    CREATININE 2 37 (H) 09/20/2021    GLUC 146 (H) 07/31/2021    GLUF 146 (H) 09/20/2021    CALCIUM 9 5 09/20/2021    AST 48 (H) 07/31/2021    ALT 43 07/31/2021    ALKPHOS 37 (L) 07/31/2021    PROT 6 6 04/11/2018    TP 8 0 07/31/2021    BILITOT 0 7 04/11/2018    TBILI 0 40 07/31/2021    EGFR 27 09/20/2021     Lab Results   Component Value Date    WBC 3 90 (L) 07/31/2021    HGB 12 8 (L) 07/31/2021    HCT 38 6 (L) 07/31/2021    MCV 79 (L) 07/31/2021     07/31/2021     Lab Results   Component Value Date    CHOLESTEROL 134 03/16/2020    CHOLESTEROL 131 05/06/2019    CHOLESTEROL 151 07/31/2018     Lab Results   Component Value Date    HDL 35 (L) 03/16/2020    HDL 40 05/06/2019    HDL 47 07/31/2018     Lab Results   Component Value Date    LDLCALC 72 03/16/2020    LDLCALC 73 05/06/2019    LDLCALC 87 07/31/2018     Lab Results   Component Value Date    TRIG 136 03/16/2020    TRIG 88 05/06/2019    TRIG 85 07/31/2018     No results found for: [place], [place]  Lab Results   Component Value Date    [id number] 2 740 05/06/2019     Lab Results   Component Value Date    CALCIUM 9 5 09/20/2021    PHOS 2 5 (L) 04/11/2018     No results found for: SPEP, UPEP  No results found for: MICROALBUR, [id number]        Objective:      /60   Pulse 65   Wt 111 kg (245 lb)   SpO2 97%   BMI 35 15 kg/m²          Physical Exam  Vitals reviewed  Constitutional:       General: He is not in acute distress  Appearance: He is well-developed  HENT:      Head: Normocephalic and atraumatic  Eyes:      Conjunctiva/sclera: Conjunctivae normal    Cardiovascular:      Rate and Rhythm: Normal rate and regular rhythm  Pulmonary:      Effort: Pulmonary effort is normal       Breath sounds: Normal breath sounds  Abdominal:      Palpations: Abdomen is soft  Musculoskeletal:         General: Swelling (trace bilateral LE edema) present  Cervical back: Neck supple  Skin:     General: Skin is warm  Findings: No rash  Neurological:      Mental Status: He is alert and oriented to person, place, and time  Cranial Nerves: No cranial nerve deficit     Psychiatric:         Behavior: Behavior normal

## 2022-01-18 ENCOUNTER — TELEPHONE (OUTPATIENT)
Dept: NEPHROLOGY | Facility: CLINIC | Age: 67
End: 2022-01-18

## 2022-01-18 NOTE — TELEPHONE ENCOUNTER
Patient called asking if it's ok to take a steroid dexamethasone  6 mg for his neck issue  Patient wanted to make sure it was ok to take with his kidney  Patient also asked if it's ok to increase his insuline  His blood sugar was 224 yesterday so he increased it from 30 to 40  This morning it was 182, he asked if he should increase it from 40 to 50

## 2022-01-18 NOTE — TELEPHONE ENCOUNTER
Yes to both, he can take the dexamethasone with kidney issues and I agree that increased insulin dosing will be needed with the steroid use    Please make sure he reduces the insulin as his steroid dose reduces

## 2022-03-08 DIAGNOSIS — F32.A DEPRESSION, UNSPECIFIED DEPRESSION TYPE: ICD-10-CM

## 2022-03-14 ENCOUNTER — TELEPHONE (OUTPATIENT)
Dept: NEPHROLOGY | Facility: CLINIC | Age: 67
End: 2022-03-14

## 2022-03-14 NOTE — TELEPHONE ENCOUNTER
Patient called stating Dr Dave Dennis put him on pregabalin 75 mg  Patient wasn't  to ask if it's safe for him to take with his kidneys  Patient stated he was on lyrica before and you took him off do to kidney diease

## 2022-03-30 ENCOUNTER — TELEPHONE (OUTPATIENT)
Dept: NEPHROLOGY | Facility: CLINIC | Age: 67
End: 2022-03-30

## 2022-03-30 NOTE — TELEPHONE ENCOUNTER
Appointment Confirmation   Person confirmed appointment with  If not patient, name of the person Patient    Date and time of appointment 3/31/22 at 10 am    Patient acknowledged and will be at appointment? yes    Did you advise the patient that they will need a urine sample if they are a new patient?  N/A    Did you advise the patient to bring their current medications for verification? (including any OTC) Yes    Additional Information

## 2022-03-31 ENCOUNTER — OFFICE VISIT (OUTPATIENT)
Dept: NEPHROLOGY | Facility: CLINIC | Age: 67
End: 2022-03-31
Payer: MEDICARE

## 2022-03-31 VITALS
HEART RATE: 61 BPM | DIASTOLIC BLOOD PRESSURE: 60 MMHG | WEIGHT: 248 LBS | OXYGEN SATURATION: 98 % | BODY MASS INDEX: 35.58 KG/M2 | SYSTOLIC BLOOD PRESSURE: 110 MMHG

## 2022-03-31 DIAGNOSIS — E08.22 DIABETES DUE TO UNDRL COND W DIABETIC CHRONIC KIDNEY DISEASE (HCC): ICD-10-CM

## 2022-03-31 DIAGNOSIS — I10 PRIMARY HYPERTENSION: ICD-10-CM

## 2022-03-31 DIAGNOSIS — E11.9 TYPE 2 DIABETES MELLITUS WITHOUT COMPLICATION, UNSPECIFIED WHETHER LONG TERM INSULIN USE (HCC): ICD-10-CM

## 2022-03-31 DIAGNOSIS — R80.1 PERSISTENT PROTEINURIA: Primary | ICD-10-CM

## 2022-03-31 DIAGNOSIS — Z90.5 ACQUIRED ABSENCE OF KIDNEY: ICD-10-CM

## 2022-03-31 PROCEDURE — 99214 OFFICE O/P EST MOD 30 MIN: CPT | Performed by: INTERNAL MEDICINE

## 2022-03-31 RX ORDER — NATEGLINIDE 60 MG/1
60 TABLET ORAL
Qty: 270 TABLET | Refills: 1 | Status: SHIPPED | OUTPATIENT
Start: 2022-03-31 | End: 2022-06-21 | Stop reason: DRUGHIGH

## 2022-03-31 RX ORDER — NATEGLINIDE 120 MG/1
60 TABLET ORAL
Qty: 270 TABLET | Refills: 1
Start: 2022-03-31 | End: 2022-03-31 | Stop reason: SDUPTHER

## 2022-03-31 RX ORDER — PREGABALIN 75 MG/1
75 CAPSULE ORAL 3 TIMES DAILY
COMMUNITY

## 2022-03-31 NOTE — ASSESSMENT & PLAN NOTE
Lab Results   Component Value Date    EGFR 27 09/20/2021    EGFR 23 07/31/2021    EGFR 27 03/16/2020    CREATININE 2 37 (H) 09/20/2021    CREATININE 2 74 (H) 07/31/2021    CREATININE 2 41 (H) 03/16/2020   kidney function is a little improved with a drop in creatinine from 2 7 to 2 37 mg/dl

## 2022-03-31 NOTE — PROGRESS NOTES
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)       Lab Results   Component Value Date    HGBA1C 5 9 (H) 11/10/2021    he has a very low A1c  He lost 25 lb in preparation for his neck surgery  He will reduced Starlix to 60 mg with meals  He is also taking Ozempic and insulin  Will add  Jardiance 10 mg which will eliminate sugar through his urine and probably improve his glucose control  He needs to watch for hypoglycemia to reduce his insulin         Relevant Medications    Empagliflozin 10 MG TABS    nateglinide (STARLIX) 120 mg tablet    Diabetes mellitus (HCC)    Relevant Medications    Empagliflozin 10 MG TABS    nateglinide (STARLIX) 120 mg tablet       Cardiovascular and Mediastinum    Hypertension       Other    Acquired absence of kidney     Being monitored for recurrence         Persistent proteinuria - Primary      microalbumin to creatinine ratio is elevated  He has underlying diabetic kidney disease  Options for treatment include an SG LT 2 inhibitor such as Jardiance or Cm Gallagher   Could consider an ACE-inhibitor which she had taken before  He will start Jardiance 10 mg daily as this may be a safer alternative at this level of GFR  Side effects include urinary tract infections and vaginal infections in women  I do not think he will have a problem with the drug  He may notice an increase in his urination and some weight loss         Relevant Medications    Empagliflozin 10 MG TABS            Subjective:      Patient ID: Rhoda Elias is a 69 y o  male  referred by Dr Ghulam SMITH He is feeling well with a stable weight of 243lbs  He had cervical disc surgery at [facility] on 2-15-22 by Dr Jody Blake with a good result      He has a history of chronic kidney disease stage 4 with a baseline creatinine of approximately 2 3 mg/dL  He has acquired absence of the left kidney in June of 2019  He has type 2 diabetes mellitus insulin requiring with diabetic nephropathy  His numbers are good  Review of his sugars twice a day reveal of fasting sugar of approximately 100-160 but mostly in the 1 teens to 130 range  Evening sugars are similar  There are no sugars over 200  The following portions of the patient's history were reviewed and updated as appropriate: allergies, current medications, past family history, past medical history, past social history, past surgical history and problem list     Review of Systems   Constitutional: Negative for fatigue  HENT: Negative for hearing loss  Eyes: Negative for visual disturbance  Respiratory: Negative for cough and shortness of breath  Cardiovascular: Negative for chest pain, palpitations and leg swelling  Gastrointestinal: Negative for abdominal pain, blood in stool, constipation, diarrhea, nausea and vomiting  Genitourinary: Negative for difficulty urinating, dysuria, frequency, hematuria and urgency  Clear yellow urine   Musculoskeletal: Positive for arthralgias and back pain  Neurological: Negative for dizziness, light-headedness and headaches  Hematological: Does not bruise/bleed easily  Psychiatric/Behavioral: Negative for dysphoric mood           Social History     Socioeconomic History    Marital status: /Civil Union     Spouse name: None    Number of children: None    Years of education: None    Highest education level: None   Occupational History    Occupation:     Tobacco Use    Smoking status: Never Smoker    Smokeless tobacco: Never Used   Vaping Use    Vaping Use: Never used   Substance and Sexual Activity    Alcohol use: No    Drug use: No    Sexual activity: None   Other Topics Concern    None   Social History Narrative    Patient is a former smoker - As per [employer] Current drug user - As per [employer]    Consumes on average 1 cup of regular coffee per day      Social Determinants of Health     Financial Resource Strain: Not on file   Food Insecurity: Not on file   Transportation Needs: Not on file   Physical Activity: Not on file   Stress: Not on file   Social Connections: Not on file   Intimate Partner Violence: Not on file   Housing Stability: Not on file     Past Medical History:   Diagnosis Date    [facility] )     Cancer of kidney ([place] [address] ) 2019    Chronic kidney disease, stage 2 (mild)     Chronic [facility] disease, stage 3 [facility]     Diabetes mellitus [facility]     Essential hypertension     Hypercholesterolemia     Hypertension     Nephrolithiasis     calcium oxalate - had lithotripsy     Seasonal allergies     Stage 3 chronic kidney disease ([facility] ) 11/14/2019    Type 2 diabetes mellitus (HCC)     Type II diabetes mellitus, uncontrolled (HCC)      Past Surgical History:   Procedure Laterality Date    ARTHROSCOPY KNEE Bilateral     CARDIAC SURGERY      balloon procedure and stent placement     FOOT SURGERY Right     implants    LAMINECTOMY      NEPHRECTOMY Left 06/2019    PARTIAL HIP ARTHROPLASTY Right     ROTATOR CUFF REPAIR      SHOULDER ARTHROSCOPY Right     SHOULDER SURGERY Left     removal of bone spur       Current Outpatient Medications:     albuterol (VENTOLIN HFA) 90 mcg/act inhaler, Inhale 2 puffs every 4 (four) hours as needed, Disp: , Rfl:     amLODIPine (NORVASC) 10 mg tablet, , Disp: , Rfl:     aspirin 81 mg chewable tablet, Chew 81 mg, Disp: , Rfl:     atorvastatin (LIPITOR) 80 mg tablet, Take by mouth, Disp: , Rfl:     cholecalciferol (VITAMIN D3) 1,000 units tablet, Take 1 tablet (1,000 Units total) by mouth daily, Disp: 90 tablet, Rfl: 1    diclofenac sodium (VOLTAREN) 1 %, apply 2 grams to affected area four times a day, Disp: , Rfl:     fenofibrate (TRICOR) 54 MG tablet, Take 54 mg by mouth daily, Disp: , Rfl:    glucose blood (OneTouch Verio) test strip, Use to test blood sugar three times daily  , Disp: 200 each, Rfl: 3    hydrochlorothiazide (HYDRODIURIL) 25 mg tablet, , Disp: , Rfl:     hydrocortisone 2 5 % cream, as needed , Disp: , Rfl:     insulin glargine (Toujeo SoloStar) 300 units/mL CONCENTRATED U-300 injection pen (1-unit dial), Inject 30 Units under the skin daily, Disp: 9 mL, Rfl: 1    isosorbide mononitrate (IMDUR) 60 mg 24 hr tablet, Take 60 mg by mouth Take 1/2 tablet by mouth bid, Disp: , Rfl:     MAGNESIUM PO, Take 500 mg by mouth, Disp: , Rfl:     metoprolol tartrate (LOPRESSOR) 50 mg tablet, Take 50 mg by mouth every 12 (twelve) hours, Disp: , Rfl:     Multiple Vitamin (MULTIVITAMIN ADULT PO), Take by mouth, Disp: , Rfl:     nateglinide (STARLIX) 120 mg tablet, Take 0 5 tablets (60 mg total) by mouth 3 (three) times a day before meals, Disp: 270 tablet, Rfl: 1    nitroglycerin (NITROSTAT) 0 4 mg SL tablet, , Disp: , Rfl:     OneTouch Delica Lancets 09I MISC, Use to test blood sugar three times daily  , Disp: 200 each, Rfl: 3    pregabalin (LYRICA) 75 mg capsule, Take 75 mg by mouth 3 (three) times a day, Disp: , Rfl:     rivaroxaban (Xarelto) 2 5 mg tablet, Take 2 5 mg by mouth 2 (two) times a day , Disp: , Rfl:     Semaglutide,0 25 or 0 5MG/DOS, 2 MG/1 5ML SOPN, Inject 0 25 mg once weekly for 4 weeks then increase to 0 5 mg once weekly  , Disp: 3 mL, Rfl: 1    sertraline (ZOLOFT) 50 mg tablet, Take 1 tablet (50 mg total) by mouth daily, Disp: 90 tablet, Rfl: 3    traZODone (DESYREL) 100 mg tablet, , Disp: , Rfl:     Empagliflozin 10 MG TABS, Take 1 tablet (10 mg total) by mouth every morning, Disp: 90 tablet, Rfl: 2    Lab Results   Component Value Date     06/19/2018    SODIUM 139 09/20/2021    K 4 2 09/20/2021     09/20/2021    CO2 29 09/20/2021    ANIONGAP 10 3 06/19/2018    AGAP 8 09/20/2021    BUN 37 (H) 09/20/2021    CREATININE 2 37 (H) 09/20/2021    GLUC 146 (H) 07/31/2021    GLUF 146 (H) 09/20/2021    CALCIUM 9 5 09/20/2021    AST 48 (H) 07/31/2021    ALT 43 07/31/2021    ALKPHOS 37 (L) 07/31/2021    PROT 6 6 04/11/2018    TP 8 0 07/31/2021    BILITOT 0 7 04/11/2018    TBILI 0 40 07/31/2021    EGFR 27 09/20/2021     Lab Results   Component Value Date    WBC 3 90 (L) 07/31/2021    HGB 12 8 (L) 07/31/2021    HCT 38 6 (L) 07/31/2021    MCV 79 (L) 07/31/2021     07/31/2021     Lab Results   Component Value Date    CHOLESTEROL 134 03/16/2020    CHOLESTEROL 131 05/06/2019    CHOLESTEROL 151 07/31/2018     Lab Results   Component Value Date    HDL 35 (L) 03/16/2020    HDL 40 05/06/2019    HDL 47 07/31/2018     Lab Results   Component Value Date    LDLCALC 72 03/16/2020    LDLCALC 73 05/06/2019    LDLCALC 87 07/31/2018     Lab Results   Component Value Date    TRIG 136 03/16/2020    TRIG 88 05/06/2019    TRIG 85 07/31/2018     No results found for: [place], [place]  Lab Results   Component Value Date    [id number] 2 740 05/06/2019     Lab Results   Component Value Date    CALCIUM 9 5 09/20/2021    PHOS 2 5 (L) 04/11/2018     No results found for: SPEP, UPEP  No results found for: CLARE BLUM        Objective:      /60   Pulse 61   Wt 112 kg (248 lb)   SpO2 98%   BMI 35 58 kg/m²      he has lost substantial amounts of weight initially was 319 with walking   Physical Exam  Vitals reviewed  Constitutional:       General: He is not in acute distress  Appearance: Normal appearance  He is normal weight  He is not toxic-appearing  HENT:      Head: Normocephalic  Right Ear: External ear normal       Left Ear: External ear normal    Eyes:      Extraocular Movements: Extraocular movements intact  Conjunctiva/sclera: Conjunctivae normal    Neck:      Vascular: No carotid bruit  Cardiovascular:      Rate and Rhythm: Normal rate and regular rhythm  Pulses: Normal pulses     Pulmonary:      Effort: Pulmonary effort is normal       Breath sounds: Normal breath sounds  Abdominal:      General: Bowel sounds are normal       Palpations: Abdomen is soft  Tenderness: There is no abdominal tenderness  Musculoskeletal:         General: Normal range of motion  Cervical back: Normal range of motion  Right lower leg: No edema  Left lower leg: No edema  Lymphadenopathy:      Cervical: No cervical adenopathy  Skin:     General: Skin is warm and dry  Neurological:      General: No focal deficit present  Mental Status: He is alert  Mental status is at baseline  Psychiatric:         Mood and Affect: Mood normal          Behavior: Behavior normal          Thought Content:  Thought content normal          Judgment: Judgment normal

## 2022-03-31 NOTE — PATIENT INSTRUCTIONS
Do not take Starlix if you are going to be doing any physical activity    In any event cut Starlix in half and take 60 mg with a meal rather than 120   Reduce your to shelter O if your morning sugars are low  You have lost weight and that improved your sugar

## 2022-03-31 NOTE — ASSESSMENT & PLAN NOTE
microalbumin to creatinine ratio is elevated  He has underlying diabetic kidney disease  Options for treatment include an SG LT 2 inhibitor such as Jardiance or Ayesha Yepeza   Could consider an ACE-inhibitor which she had taken before  He will start Jardiance 10 mg daily as this may be a safer alternative at this level of GFR  Side effects include urinary tract infections and vaginal infections in women  I do not think he will have a problem with the drug    He may notice an increase in his urination and some weight loss

## 2022-03-31 NOTE — ASSESSMENT & PLAN NOTE
Lab Results   Component Value Date    HGBA1C 5 9 (H) 11/10/2021    he has a very low A1c  He lost 25 lb in preparation for his neck surgery  He will reduced Starlix to 60 mg with meals  He is also taking Ozempic and insulin  Will add  Jardiance 10 mg which will eliminate sugar through his urine and probably improve his glucose control    He needs to watch for hypoglycemia to reduce his insulin

## 2022-04-15 ENCOUNTER — OFFICE VISIT (OUTPATIENT)
Dept: URGENT CARE | Facility: CLINIC | Age: 67
End: 2022-04-15
Payer: MEDICARE

## 2022-04-15 VITALS — RESPIRATION RATE: 18 BRPM | HEART RATE: 64 BPM | TEMPERATURE: 98.9 F | OXYGEN SATURATION: 97 %

## 2022-04-15 DIAGNOSIS — H61.21 IMPACTED CERUMEN OF RIGHT EAR: Primary | ICD-10-CM

## 2022-04-15 PROCEDURE — 99213 OFFICE O/P EST LOW 20 MIN: CPT | Performed by: PHYSICIAN ASSISTANT

## 2022-04-15 PROCEDURE — 69209 REMOVE IMPACTED EAR WAX UNI: CPT | Performed by: PHYSICIAN ASSISTANT

## 2022-04-15 PROCEDURE — G0463 HOSPITAL OUTPT CLINIC VISIT: HCPCS | Performed by: PHYSICIAN ASSISTANT

## 2022-04-15 NOTE — PROGRESS NOTES
[facility]    NAME: Yao Turk is a 69 y o  male  : 1955    MRN: [id number]  DATE: April 15, 2022  TIME: 5:18 PM    Assessment and Plan   Impacted cerumen of right ear [H61 21]  1  Impacted cerumen of right ear     Ear cerumen removal    Date/Time: 4/15/2022 5:17 PM  Performed by: Rika Chung PA-C  Authorized by: Rika Chung PA-C     Patient location:  Clinic  Procedure details:     Location:  R ear    Procedure type: irrigation only      Approach:  External  Post-procedure details:     Complication:  None    Hearing quality:  Improved    Patient tolerance of procedure: Tolerated well, no immediate complications  Comments:      Right TM normal after cerumen removal        Patient Instructions     Patient Instructions   Follow up as needed      Chief Complaint     Chief Complaint   Patient presents with    Ear Fullness     x2-3 days: B/L inner ears blocked/fullness with slight pain of 2/10       History of Present Illness   69year old male here with complaint of ears feeling blocked  Has a history of cerumen impaction  No cold symptoms  No ear pain  Review of Systems   Review of Systems   Constitutional: Negative for chills, fatigue and fever  HENT: Positive for hearing loss (ears feel blocked)  Negative for congestion, ear pain, postnasal drip, sinus pressure and sore throat  Respiratory: Negative for cough, shortness of breath and wheezing  Neurological: Negative for headaches  All other systems reviewed and are negative        Current Medications     Current Outpatient Medications:     amLODIPine (NORVASC) 10 mg tablet, , Disp: , Rfl:     aspirin 81 mg chewable tablet, Chew 81 mg, Disp: , Rfl:     atorvastatin (LIPITOR) 80 mg tablet, Take by mouth, Disp: , Rfl:     cholecalciferol (VITAMIN D3) 1,000 units tablet, Take 1 tablet (1,000 Units total) by mouth daily, Disp: 90 tablet, Rfl: 1    diclofenac sodium (VOLTAREN) 1 %, apply 2 grams to affected area four times a day, Disp: , Rfl:     fenofibrate (TRICOR) 54 MG tablet, Take 54 mg by mouth daily, Disp: , Rfl:     glucose blood (OneTouch Verio) test strip, Use to test blood sugar three times daily  , Disp: 200 each, Rfl: 3    hydrochlorothiazide (HYDRODIURIL) 25 mg tablet, , Disp: , Rfl:     hydrocortisone 2 5 % cream, as needed , Disp: , Rfl:     insulin glargine (Toujeo SoloStar) 300 units/mL CONCENTRATED U-300 injection pen (1-unit dial), Inject 30 Units under the skin daily, Disp: 9 mL, Rfl: 1    isosorbide mononitrate (IMDUR) 60 mg 24 hr tablet, Take 60 mg by mouth Take 1/2 tablet by mouth bid, Disp: , Rfl:     MAGNESIUM PO, Take 500 mg by mouth, Disp: , Rfl:     metoprolol tartrate (LOPRESSOR) 50 mg tablet, Take 50 mg by mouth every 12 (twelve) hours, Disp: , Rfl:     Multiple Vitamin (MULTIVITAMIN ADULT PO), Take by mouth, Disp: , Rfl:     nateglinide (STARLIX) 60 mg tablet, Take 1 tablet (60 mg total) by mouth 3 (three) times a day before meals, Disp: 270 tablet, Rfl: 1    nitroglycerin (NITROSTAT) 0 4 mg SL tablet, , Disp: , Rfl:     OneTouch Delica Lancets 61G MISC, Use to test blood sugar three times daily  , Disp: 200 each, Rfl: 3    pregabalin (LYRICA) 75 mg capsule, Take 75 mg by mouth 3 (three) times a day, Disp: , Rfl:     rivaroxaban (Xarelto) 2 5 mg tablet, Take 2 5 mg by mouth 2 (two) times a day , Disp: , Rfl:     Semaglutide,0 25 or 0 5MG/DOS, (Ozempic, 0 25 or 0 5 MG/DOSE,) 2 MG/1 5ML SOPN, 0 5mg weekly, Disp: 4 5 mL, Rfl: 1    sertraline (ZOLOFT) 50 mg tablet, Take 1 tablet (50 mg total) by mouth daily, Disp: 90 tablet, Rfl: 3    traZODone (DESYREL) 100 mg tablet, , Disp: , Rfl:     albuterol (VENTOLIN HFA) 90 mcg/act inhaler, Inhale 2 puffs every 4 (four) hours as needed (Patient not taking: Reported on 4/15/2022 ), Disp: , Rfl:     Empagliflozin 10 MG TABS, Take 1 tablet (10 mg total) by mouth every morning (Patient not taking: Reported on 4/15/2022 ), Disp: 90 tablet, Rfl: 2    Current Allergies     Allergies as of 04/15/2022 - Reviewed 04/15/2022   Allergen Reaction Noted    Percocet [oxycodone-acetaminophen]  11/14/2019    Tetracycline  02/02/2017          The following portions of the patient's history were reviewed and updated as appropriate: allergies, current medications, past family history, past medical history, past social history, past surgical history and problem list    Past Medical History:   Diagnosis Date    Angina pectoris, unspecified ([facility] )     Cancer of kidney ([facility] ) 2019    Chronic kidney disease, stage 2 (mild)     [facility]     [facility]     Essential hypertension     Hypercholesterolemia     Hypertension     Nephrolithiasis     calcium oxalate - had lithotripsy     Seasonal allergies     Stage 3 [facility] disease ([facility] ) 11/14/2019    Type 2 diabetes mellitus ([facility] )     Type II diabetes mellitus, uncontrolled ([place] [address] )      Past Surgical History:   Procedure Laterality Date    ARTHROSCOPY KNEE Bilateral     CARDIAC SURGERY      balloon procedure and stent placement     FOOT SURGERY Right     implants    LAMINECTOMY      NEPHRECTOMY Left 06/2019    PARTIAL HIP ARTHROPLASTY Right     ROTATOR CUFF REPAIR      SHOULDER ARTHROSCOPY Right     SHOULDER SURGERY Left     removal of bone spur     Family History   Problem Relation Age of Onset    Diabetes Mother     Heart attack Father     Heart disease Father      Social History     Socioeconomic History    Marital status: /Civil Union     Spouse name: Not on file    Number of children: Not on file    Years of education: Not on file    Highest education level: Not on file   Occupational History    Occupation:     Tobacco Use    Smoking status: Never Smoker    Smokeless tobacco: Never Used   Vaping Use    Vaping Use: Never used   Substance and Sexual Activity    Alcohol use: No    Drug use: No    Sexual activity: Not on file Other Topics Concern    Not on file   Social History Narrative    Patient is a former smoker - As per [employer]    Current drug user - As per [address]    Consumes on average 1 cup of regular coffee per day      Social Determinants of Health     Financial Resource Strain: Not on file   Food Insecurity: Not on file   Transportation Needs: Not on file   Physical Activity: Not on file   Stress: Not on file   Social Connections: Not on file   Intimate Partner Violence: Not on file   Housing Stability: Not on file     Medications have been verified  Objective   Pulse 64   Temp 98 9 °F (37 2 °C)   Resp 18   SpO2 97%      Physical Exam   Physical Exam  Vitals reviewed  Constitutional:       General: He is not in acute distress  Appearance: He is well-developed  HENT:      Head: Normocephalic and atraumatic  Right Ear: Tympanic membrane normal  There is impacted cerumen  Left Ear: Tympanic membrane, ear canal and external ear normal       Nose: Nose normal  No mucosal edema or congestion  Mouth/Throat:      Mouth: Mucous membranes are moist       Pharynx: No posterior oropharyngeal erythema  Cardiovascular:      Rate and Rhythm: Normal rate and regular rhythm  Heart sounds: Normal heart sounds  Pulmonary:      Effort: Pulmonary effort is normal  No respiratory distress  Breath sounds: Normal breath sounds

## 2022-05-26 ENCOUNTER — OFFICE VISIT (OUTPATIENT)
Dept: URGENT CARE | Facility: CLINIC | Age: 67
End: 2022-05-26
Payer: MEDICARE

## 2022-05-26 VITALS
SYSTOLIC BLOOD PRESSURE: 127 MMHG | TEMPERATURE: 97.6 F | WEIGHT: 248 LBS | HEART RATE: 66 BPM | RESPIRATION RATE: 18 BRPM | OXYGEN SATURATION: 98 % | BODY MASS INDEX: 35.58 KG/M2 | DIASTOLIC BLOOD PRESSURE: 68 MMHG

## 2022-05-26 DIAGNOSIS — M54.2 NECK PAIN ON RIGHT SIDE: Primary | ICD-10-CM

## 2022-05-26 DIAGNOSIS — H93.13 TINNITUS OF BOTH EARS: ICD-10-CM

## 2022-05-26 PROCEDURE — 99213 OFFICE O/P EST LOW 20 MIN: CPT | Performed by: PHYSICIAN ASSISTANT

## 2022-05-26 PROCEDURE — G0463 HOSPITAL OUTPT CLINIC VISIT: HCPCS | Performed by: PHYSICIAN ASSISTANT

## 2022-05-26 RX ORDER — METHOCARBAMOL 500 MG/1
500 TABLET, FILM COATED ORAL 4 TIMES DAILY
Qty: 20 TABLET | Refills: 0 | Status: SHIPPED | OUTPATIENT
Start: 2022-05-26

## 2022-05-26 NOTE — PROGRESS NOTES
[facility]        NAME: Georges Khalil is a 69 y o  male  : 1955    MRN: [id number]  DATE: May 26, 2022  TIME: 12:28 PM    Assessment and Plan   Neck pain on right side [M54 2]  1  Neck pain on right side  methocarbamol (ROBAXIN) 500 mg tablet   2  Tinnitus of both ears           Patient Instructions     Take medications as prescribed  Follow up with PCP in 3-5 days  Proceed to  ER if symptoms worsen  Chief Complaint     Chief Complaint   Patient presents with    Headache     Pain on right side of head did have neck surgery in January          History of Present Illness       Patient is a 69year old male [facility] with right sided head/neck pain  Patient reports pain began a few days ago  Patient denies any known injury  Pain has not been constant  Pain radiates behind right ear  Position and movement brings on pain  Pt reports throbbing sensation in ears  Review of Systems   Review of Systems   Constitutional: Negative for chills and fever  HENT: Positive for tinnitus  Negative for ear pain and sore throat  Eyes: Negative for pain and visual disturbance  Respiratory: Negative for cough and shortness of breath  Cardiovascular: Negative for chest pain and palpitations  Gastrointestinal: Negative for abdominal pain and vomiting  Genitourinary: Negative for dysuria and hematuria  Musculoskeletal: Negative for arthralgias and back pain  Skin: Negative for color change and rash  Neurological: Positive for headaches  Negative for seizures and syncope  All other systems reviewed and are negative          Current Medications       Current Outpatient Medications:     methocarbamol (ROBAXIN) 500 mg tablet, Take 1 tablet (500 mg total) by mouth 4 (four) times a day, Disp: 20 tablet, Rfl: 0    albuterol (VENTOLIN HFA) 90 mcg/act inhaler, Inhale 2 puffs every 4 (four) hours as needed (Patient not taking: Reported on 4/15/2022 ), Disp: , Rfl:     amLODIPine (NORVASC) 10 mg tablet, , Disp: , Rfl:     aspirin 81 mg chewable tablet, Chew 81 mg, Disp: , Rfl:     atorvastatin (LIPITOR) 80 mg tablet, Take by mouth, Disp: , Rfl:     cholecalciferol (VITAMIN D3) 1,000 units tablet, Take 1 tablet (1,000 Units total) by mouth daily, Disp: 90 tablet, Rfl: 1    diclofenac sodium (VOLTAREN) 1 %, apply 2 grams to affected area four times a day, Disp: , Rfl:     Empagliflozin 10 MG TABS, Take 1 tablet (10 mg total) by mouth every morning (Patient not taking: Reported on 4/15/2022 ), Disp: 90 tablet, Rfl: 2    fenofibrate (TRICOR) 54 MG tablet, Take 54 mg by mouth daily, Disp: , Rfl:     glucose blood (OneTouch Verio) test strip, Use to test blood sugar three times daily  , Disp: 200 each, Rfl: 3    hydrochlorothiazide (HYDRODIURIL) 25 mg tablet, , Disp: , Rfl:     hydrocortisone 2 5 % cream, as needed , Disp: , Rfl:     insulin glargine (Toujeo SoloStar) 300 units/mL CONCENTRATED U-300 injection pen (1-unit dial), Inject 30 Units under the skin daily, Disp: 9 mL, Rfl: 1    isosorbide mononitrate (IMDUR) 60 mg 24 hr tablet, Take 60 mg by mouth Take 1/2 tablet by mouth bid, Disp: , Rfl:     MAGNESIUM PO, Take 500 mg by mouth, Disp: , Rfl:     metoprolol tartrate (LOPRESSOR) 50 mg tablet, Take 50 mg by mouth every 12 (twelve) hours, Disp: , Rfl:     Multiple Vitamin (MULTIVITAMIN ADULT PO), Take by mouth, Disp: , Rfl:     nateglinide (STARLIX) 60 mg tablet, Take 1 tablet (60 mg total) by mouth 3 (three) times a day before meals, Disp: 270 tablet, Rfl: 1    nitroglycerin (NITROSTAT) 0 4 mg SL tablet, , Disp: , Rfl:     OneTouch Delica Lancets 60L MISC, Use to test blood sugar three times daily  , Disp: 200 each, Rfl: 3    pregabalin (LYRICA) 75 mg capsule, Take 75 mg by mouth 3 (three) times a day, Disp: , Rfl:     rivaroxaban (Xarelto) 2 5 mg tablet, Take 2 5 mg by mouth 2 (two) times a day , Disp: , Rfl:     Semaglutide,0 25 or 0 5MG/DOS, (Ozempic, 0 25 or 0 5 MG/DOSE,) 2 MG/1 5ML SOPN, 0 5mg weekly, Disp: 4 5 mL, Rfl: 1    sertraline (ZOLOFT) 50 mg tablet, Take 1 tablet (50 mg total) by mouth daily, Disp: 90 tablet, Rfl: 3    traZODone (DESYREL) 100 mg tablet, , Disp: , Rfl:     Current Allergies     Allergies as of 05/26/2022 - Reviewed 05/26/2022   Allergen Reaction Noted    Percocet [oxycodone-acetaminophen]  11/14/2019    Tetracycline  02/02/2017            The following portions of the patient's history were reviewed and updated as appropriate: allergies, current medications, past family history, past medical history, past social history, past surgical history and problem list      Past Medical History:   Diagnosis Date    [facility]ina [facility] )     Cancer of kidney ([facility] ) 2019    Chronic kidney disease, stage 2 (mild)     Chronic kidney disease, stage 3 ([facility] )     Diabetes mellitus ([facility] )     Essential hypertension     Hypercholesterolemia     Hypertension     Nephrolithiasis     calcium oxalate - had lithotripsy     Seasonal allergies     Stage 3 chronic kidney disease ([facility] ) 11/14/2019    Type 2 diabetes mellitus (HCC)     Type II diabetes mellitus, uncontrolled        Past Surgical History:   Procedure Laterality Date    ARTHROSCOPY KNEE Bilateral     CARDIAC SURGERY      balloon procedure and stent placement     FOOT SURGERY Right     implants    LAMINECTOMY      NEPHRECTOMY Left 06/2019    PARTIAL HIP ARTHROPLASTY Right     ROTATOR CUFF REPAIR      SHOULDER ARTHROSCOPY Right     SHOULDER SURGERY Left     removal of bone spur       Family History   Problem Relation Age of Onset    Diabetes Mother     Heart attack Father     Heart disease Father          Medications have been verified  Objective   /68   Pulse 66   Temp 97 6 °F (36 4 °C)   Resp 18   Wt 112 kg (248 lb)   SpO2 98%   BMI 35 58 kg/m²   No LMP for male patient         Physical Exam     Physical Exam  Constitutional:       Appearance: Normal appearance  He is normal weight  HENT:      Head: Normocephalic and atraumatic  Nose: Nose normal       Mouth/Throat:      Mouth: Mucous membranes are moist    Eyes:      Extraocular Movements: Extraocular movements intact  Conjunctiva/sclera: Conjunctivae normal       Pupils: Pupils are equal, round, and reactive to light  Cardiovascular:      Rate and Rhythm: Normal rate  Pulmonary:      Effort: Pulmonary effort is normal    Musculoskeletal:         General: Normal range of motion  Cervical back: Normal range of motion and neck supple  Skin:     General: Skin is warm and dry  Neurological:      General: No focal deficit present  Mental Status: He is alert and oriented to person, place, and time     Psychiatric:         Mood and Affect: Mood normal          Behavior: Behavior normal

## 2022-06-10 ENCOUNTER — TELEPHONE (OUTPATIENT)
Dept: GASTROENTEROLOGY | Facility: CLINIC | Age: 67
End: 2022-06-10

## 2022-06-10 DIAGNOSIS — Z79.4 TYPE 2 DIABETES MELLITUS WITH STAGE 4 CHRONIC KIDNEY DISEASE, WITH LONG-TERM CURRENT USE OF INSULIN (HCC): ICD-10-CM

## 2022-06-10 DIAGNOSIS — E11.22 TYPE 2 DIABETES MELLITUS WITH STAGE 4 CHRONIC KIDNEY DISEASE, WITH LONG-TERM CURRENT USE OF INSULIN (HCC): ICD-10-CM

## 2022-06-10 DIAGNOSIS — N18.4 TYPE 2 DIABETES MELLITUS WITH STAGE 4 CHRONIC KIDNEY DISEASE, WITH LONG-TERM CURRENT USE OF INSULIN (HCC): ICD-10-CM

## 2022-06-10 RX ORDER — INSULIN GLARGINE 300 U/ML
46 INJECTION, SOLUTION SUBCUTANEOUS DAILY
Qty: 15 ML | Refills: 0 | Status: SHIPPED | OUTPATIENT
Start: 2022-06-10 | End: 2022-09-08

## 2022-06-10 NOTE — TELEPHONE ENCOUNTER
Pt walked into the office that [address] will need a prior auth  He stated he can not get his medication at the medication  He stated he can not get his sugars until 200  Pt stated he should be on 40 units and not 30 units  Pt stated he only has enough for 3 days

## 2022-06-10 NOTE — TELEPHONE ENCOUNTER
Dear would be helpful to see blood sugar logs to make further recommendations  He may need to make adjustments in his other medications, and this can be discussed further with Jaqueline Barron at his next office visit  In the meantime his Toujeo can be increased to 46 units

## 2022-06-10 NOTE — TELEPHONE ENCOUNTER
Gave patient a sample pen of toujeo max solostar, will start the prior auth for him asap  He wanted to let Rosa Cruz know his sugars have not been below 200  Please advise as to what he should do     Thank you

## 2022-06-16 ENCOUNTER — TELEPHONE (OUTPATIENT)
Dept: ENDOCRINOLOGY | Facility: CLINIC | Age: 67
End: 2022-06-16

## 2022-06-16 NOTE — TELEPHONE ENCOUNTER
Send My Chart message requesting mail order phone number as the one we have is just an automated message stating to call the number on the back of the patients card

## 2022-06-16 NOTE — TELEPHONE ENCOUNTER
Patient stating he needs notification sent to the pharmacy that he is in need of Toujeo  They are refusing to fill medication  [facility]

## 2022-06-16 NOTE — TELEPHONE ENCOUNTER
Tried calling [facility] mail order where the medication was sent to try and resolve issue however its just an automated number saying to call the number on the back of the patients card  Which we dont have on file   Left  and sent [employer] message as well

## 2022-06-20 DIAGNOSIS — N18.4 CKD (CHRONIC KIDNEY DISEASE), STAGE IV (HCC): Primary | ICD-10-CM

## 2022-06-20 NOTE — PROGRESS NOTES
Established Patient Progress Note      Chief Complaint   Patient presents with    Diabetes Type 2     Patient reports BG high after receiving epidural about month ago  History of Present Illness:   Juan A Combs is a 69 y o  male with hypertension, hyperlipidemia, coronary artery disease, chronic kidney disease stage 4 status post left nephrectomy for renal cell cancer in 2017, and type 2 diabetes presents to the office today for follow-up  Denies recent severe hypoglycemic or severe hyperglycemic episodes  Denies any issues with his current regimen  home glucose monitoring: are performed regularly, once daily    At patient's last appointment, P glipizide was discontinued  Patient had reduced his Toujeo significantly since starting Ozempic  Patient underwent an elective anterior cervical discectomy and fusion of C4-C5 on 01/11/2022  He reports improvement in his neck pain  Hemoglobin A1c has increased significantly  He attributes this to receiving an epidural injection approximately 1 month ago  However, I anticipate that prior to this, his blood sugars were RD starting to be elevated  He has symptomatic of his hyperglycemia with increased fatigue and general malaise  Nephrology recommended SGL T2 to treat protein urea  Patient reports that his insurance would not cover this  Hemoglobin A1C <5 7 % 8 2 High   5 9 High          Home blood glucose readings:   Before breakfast:  196, 94, 134, 195, 180, 203, 202, 211, 189, 218, 195, 187, 203, 184  Before lunch:  244, 244, 243, 244, 245, 243  Before dinner:  160, 174, 182, 170, 162, 190, 182, 189, 179, 190, 179, 190, 168     Current regimen:   Jardiance 10 mg daily? Toujeo 46 units nightly  Nateglinide 60 mg 3 times daily before meals  Ozempic 0 5 mg once weekly    Last Eye Exam:  Up-to-date  Last Foot Exam:  Up-to-date    For for hyperlipidemia, he is taking 80 mg of atorvastatin nightly  He denies myalgias    For hypertension, he is taking 10 mg of amlodipine, 25 mg of hydrochlorothiazide, 60 mg of isosorbide mononitrate daily and 50 mg of metoprolol tartrate twice daily  He denies headache, pedal edema, and orthostatic hypotension      Patient Active Problem List   Diagnosis    Acquired absence of kidney    Pure hypercholesterolemia    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 ([facility] )    Hypertension    SOB (shortness of breath)    Hearing loss due to cerumen impaction, bilateral    CKD (chronic kidney [facility]), stage IV [facility]    Hypertensive chronic kidney disease with stage 1 through stage 4 chronic kidney disease, or unspecified chronic kidney disease    Coronary artery disease involving native coronary artery without angina pectoris    Degeneration of lumbosacral intervertebral disc    Lumbar radiculopathy    Nephrolithiasis    Diabetes mellitus ([facility])    Chronic kidney disease-mineral and bone disorder    Class 2 severe obesity due to excess calories with serious comorbidity and body mass index (BMI) of 35 0 to 35 9 in adult ([facility])    Vitamin D deficiency    Elevated uric acid in blood    Chronic knee pain    Neck pain    Persistent proteinuria      Past Medical History:   Diagnosis Date    Angina pectoris, unspecified ([place] [address] )     [facility] 2019    Chronic kidney disease, stage 2 (mild)     Chronic kidney disease, stage 3 ([facility]     Diabetes mellitus [facility]     Hypercholesterolemia     Hypertension     Nephrolithiasis     calcium oxalate - had lithotripsy     Seasonal allergies     Stage 3 chronic kidney disease ([facility] ) 11/14/2019      Past Surgical History:   Procedure Laterality Date    ARTHROSCOPY KNEE Bilateral     CARDIAC SURGERY      balloon procedure and stent placement     FOOT SURGERY Right     implants    LAMINECTOMY      NEPHRECTOMY Left 06/2019    PARTIAL HIP ARTHROPLASTY Right     ROTATOR CUFF REPAIR      SHOULDER ARTHROSCOPY Right     SHOULDER SURGERY Left     removal of bone spur      Family History   Problem Relation Age of Onset    Diabetes Mother     Heart attack Father     Heart disease Father      Social History     Tobacco Use    Smoking status: Never Smoker    Smokeless tobacco: Never Used   Substance Use Topics    Alcohol use: No     Allergies   Allergen Reactions    Percocet [Oxycodone-Acetaminophen]     Tetracycline          Current Outpatient Medications:     albuterol (PROVENTIL HFA,VENTOLIN HFA) 90 mcg/act inhaler, Inhale 2 puffs every 4 (four) hours as needed, Disp: , Rfl:     amLODIPine (NORVASC) 10 mg tablet, , Disp: , Rfl:     ammonium lactate (LAC-HYDRIN) 12 % cream, ammonium lactate 12 % topical cream, Disp: , Rfl:     aspirin 81 mg chewable tablet, Chew 81 mg, Disp: , Rfl:     atorvastatin (LIPITOR) 80 mg tablet, Take by mouth, Disp: , Rfl:     cholecalciferol (VITAMIN D3) 1,000 units tablet, Take 1 tablet (1,000 Units total) by mouth daily, Disp: 90 tablet, Rfl: 1    diclofenac sodium (VOLTAREN) 1 %, apply 2 grams to affected area four times a day, Disp: , Rfl:     Empagliflozin 10 MG TABS, Take 1 tablet (10 mg total) by mouth every morning, Disp: 90 tablet, Rfl: 2    fenofibrate (TRICOR) 54 MG tablet, Take 54 mg by mouth daily, Disp: , Rfl:     glucose blood (OneTouch Verio) test strip, Use to test blood sugar three times daily  , Disp: 200 each, Rfl: 3    hydrochlorothiazide (HYDRODIURIL) 25 mg tablet, , Disp: , Rfl:     hydrocortisone 2 5 % cream, as needed , Disp: , Rfl:     insulin glargine (Toujeo SoloStar) 300 units/mL CONCENTRATED U-300 injection pen (1-unit dial), Inject 46 Units under the skin daily, Disp: 15 mL, Rfl: 0    isosorbide mononitrate (IMDUR) 60 mg 24 hr tablet, Take 60 mg by mouth Take 1/2 tablet by mouth bid, Disp: , Rfl:     MAGNESIUM PO, Take 500 mg by mouth, Disp: , Rfl:     metoprolol tartrate (LOPRESSOR) 50 mg tablet, Take 50 mg by mouth every 12 (twelve) hours, Disp: , Rfl:     Multiple Vitamin (MULTIVITAMIN ADULT PO), Take by mouth, Disp: , Rfl:     nateglinide (STARLIX) 120 mg tablet, Take 1 tablet (120 mg total) by mouth 3 (three) times a day before meals, Disp: 270 tablet, Rfl: 1    nitroglycerin (NITROSTAT) 0 4 mg SL tablet, , Disp: , Rfl:     OneTouch Delica Lancets 22D MISC, Use to test blood sugar three times daily  , Disp: 200 each, Rfl: 3    pregabalin (LYRICA) 75 mg capsule, Take 75 mg by mouth 3 (three) times a day, Disp: , Rfl:     rivaroxaban (XARELTO) 2 5 mg tablet, Take 2 5 mg by mouth 2 (two) times a day , Disp: , Rfl:     semaglutide, 1 mg/dose, (Ozempic) 4 MG/3ML SOPN injection pen, Inject 0 75 mL (1 mg total) under the skin once a week, Disp: 9 mL, Rfl: 1    sertraline (ZOLOFT) 50 mg tablet, Take 1 tablet (50 mg total) by mouth daily, Disp: 90 tablet, Rfl: 3    traZODone (DESYREL) 100 mg tablet, , Disp: , Rfl:     methocarbamol (ROBAXIN) 500 mg tablet, Take 1 tablet (500 mg total) by mouth 4 (four) times a day (Patient not taking: Reported on 6/21/2022), Disp: 20 tablet, Rfl: 0    Review of Systems   Constitutional: Positive for fatigue  Negative for activity change, appetite change and unexpected weight change  HENT: Negative for dental problem, sore throat, trouble swallowing and voice change  Eyes: Negative for visual disturbance  Respiratory: Negative for cough, chest tightness and shortness of breath  Cardiovascular: Negative for chest pain, palpitations and leg swelling  Gastrointestinal: Negative for constipation, diarrhea, nausea and vomiting  Endocrine: Negative for polydipsia, polyphagia and polyuria  Genitourinary: Negative for frequency  Musculoskeletal: Positive for arthralgias and back pain  Negative for gait problem and myalgias  Skin: Negative for wound  Allergic/Immunologic: Positive for environmental allergies  Negative for food allergies  Neurological: Positive for numbness  Negative for dizziness, weakness, light-headedness and headaches  Hematological: Does not bruise/bleed easily  Psychiatric/Behavioral: Negative for decreased concentration, dysphoric mood and sleep disturbance  The patient is not nervous/anxious  Physical Exam:  Body mass index is 35 58 kg/m²  /78   Pulse 68   Ht 5' 10" (1 778 m)   Wt 112 kg (248 lb)   SpO2 99%   BMI 35 58 kg/m²    Wt Readings from Last 3 Encounters:   06/21/22 112 kg (248 lb)   05/26/22 112 kg (248 lb)   03/31/22 112 kg (248 lb)       Physical Exam  Vitals reviewed  Constitutional:       General: He is not in acute distress  Appearance: He is well-developed  He is obese  He is not ill-appearing  HENT:      Head: Normocephalic and atraumatic  Eyes:      Pupils: Pupils are equal, round, and reactive to light  Neck:      Thyroid: No thyromegaly  Cardiovascular:      Rate and Rhythm: Normal rate and regular rhythm  Pulses: Normal pulses  Heart sounds: Normal heart sounds  Pulmonary:      Effort: Pulmonary effort is normal       Breath sounds: Normal breath sounds  Abdominal:      General: Bowel sounds are normal  There is no distension  Palpations: Abdomen is soft  Tenderness: There is no abdominal tenderness  Musculoskeletal:      Cervical back: Normal range of motion and neck supple  Right lower leg: No edema  Left lower leg: No edema  Lymphadenopathy:      Cervical: No cervical adenopathy  Skin:     General: Skin is warm and dry  Capillary Refill: Capillary refill takes less than 2 seconds  Neurological:      Mental Status: He is alert and oriented to person, place, and time        Gait: Gait normal    Psychiatric:         Mood and Affect: Mood normal          Behavior: Behavior normal            Labs:   Lab Results   Component Value Date    HGBA1C 8 2 (H) 06/16/2022    HGBA1C 5 9 (H) 11/10/2021    HGBA1C 5 9 11/10/2021     Lab Results   Component Value Date    CREATININE 2 37 (H) 09/20/2021    CREATININE 2 74 (H) 07/31/2021 CREATININE 2 41 (H) 03/16/2020    BUN 37 (H) 09/20/2021     06/19/2018    K 4 2 09/20/2021     09/20/2021    CO2 29 09/20/2021     eGFR   Date Value Ref Range Status   09/20/2021 27 ml/min/1 73sq m Final     Lab Results   Component Value Date    CHOL 140 05/15/2018    HDL 35 (L) 03/16/2020    TRIG 136 03/16/2020     Lab Results   Component Value Date    ALT 43 07/31/2021    AST 48 (H) 07/31/2021    ALKPHOS 37 (L) 07/31/2021    BILITOT 0 7 04/11/2018     Lab Results   Component Value Date    [id number] 2 740 05/06/2019     No results found for: ROSCOE ZAMORAI    Impression & Plan:    Problem List Items Addressed This Visit        Endocrine    Diabetes mellitus ([facility] ) - Primary     Patient's control has worsened  He is having more frequent episodes of hyperglycemia  Increase Ozempic to 1 mg once weekly  Resume nateglinide 120 mg 3 times daily prior to meals  Start [place]  This will treat both his hyperglycemia and his albuminuria  Patient's nephrologist would also like him to be on an SGL T2   Patient is to notify me should he receive any difficulty with his insurance company  Continue to test blood sugar 3 times daily  Continue with 46 units of Toujeo nightly  Ideally, the increase in Ozempic will offer a decrease in basal insulin  Continue to follow a healthy diet  Continue to engage in physical activity as tolerated  Patient is to notify me should he decide to have another epidural injection in which case we can temporarily increase his basal insulin  Check labs prior to next appointment in 3-4 months  Lab Results   Component Value Date    HGBA1C 8 2 (H) 06/16/2022              Relevant Medications    semaglutide, 1 mg/dose, (Ozempic) 4 MG/3ML SOPN injection pen    nateglinide (STARLIX) 120 mg tablet    Empagliflozin 10 MG TABS    Other Relevant Orders    Hemoglobin A1C    Lipid Panel with Direct LDL reflex       Cardiovascular and Mediastinum    Hypertension     BP stable 130/78  Continue current regimen  Other    Pure hypercholesterolemia     Lipid panel stable  Continue 80 mg of atorvastatin  Class 2 severe obesity due to excess calories with serious comorbidity and body mass index (BMI) of 35 0 to 35 9 in adult (HCC)     Increase Ozempic to 1 mg once weekly  Relevant Medications    semaglutide, 1 mg/dose, (Ozempic) 4 MG/3ML SOPN injection pen    Vitamin D deficiency     Continue vitamin-D supplement  Persistent proteinuria     Start Jardiance 10 mg daily  This is a recommendation from both endocrinology and Nephrology  Relevant Medications    Empagliflozin 10 MG TABS          Orders Placed This Encounter   Procedures    Hemoglobin A1C     Standing Status:   Future     Standing Expiration Date:   6/20/2023    Lipid Panel with Direct LDL reflex     This is a patient instruction: This test requires patient fasting for 10-12 hours or longer  Drinking of black coffee or black tea is acceptable  Standing Status:   Future     Standing Expiration Date:   6/20/2023       There are no Patient Instructions on file for this visit  Discussed with the patient and all questioned fully answered  He will call me if any problems arise  Follow-up appointment in 4 months       Counseled patient on diagnostic results, prognosis, risk and benefit of treatment options, instruction for management, importance of treatment compliance, Risk  factor reduction and impressions    TORREY Martinez

## 2022-06-21 ENCOUNTER — OFFICE VISIT (OUTPATIENT)
Dept: ENDOCRINOLOGY | Facility: CLINIC | Age: 67
End: 2022-06-21
Payer: MEDICARE

## 2022-06-21 ENCOUNTER — TELEPHONE (OUTPATIENT)
Dept: ADMINISTRATIVE | Facility: OTHER | Age: 67
End: 2022-06-21

## 2022-06-21 VITALS
BODY MASS INDEX: 35.5 KG/M2 | HEART RATE: 68 BPM | SYSTOLIC BLOOD PRESSURE: 130 MMHG | WEIGHT: 248 LBS | DIASTOLIC BLOOD PRESSURE: 78 MMHG | OXYGEN SATURATION: 99 % | HEIGHT: 70 IN

## 2022-06-21 DIAGNOSIS — E66.01 SEVERE OBESITY (BMI >= 40) (HCC): ICD-10-CM

## 2022-06-21 DIAGNOSIS — E78.00 PURE HYPERCHOLESTEROLEMIA: ICD-10-CM

## 2022-06-21 DIAGNOSIS — Z79.4 TYPE 2 DIABETES MELLITUS WITH STAGE 4 CHRONIC KIDNEY DISEASE, WITH LONG-TERM CURRENT USE OF INSULIN (HCC): Primary | ICD-10-CM

## 2022-06-21 DIAGNOSIS — I10 PRIMARY HYPERTENSION: ICD-10-CM

## 2022-06-21 DIAGNOSIS — R80.1 PERSISTENT PROTEINURIA: ICD-10-CM

## 2022-06-21 DIAGNOSIS — E66.01 CLASS 2 SEVERE OBESITY DUE TO EXCESS CALORIES WITH SERIOUS COMORBIDITY AND BODY MASS INDEX (BMI) OF 35.0 TO 35.9 IN ADULT (HCC): ICD-10-CM

## 2022-06-21 DIAGNOSIS — E11.22 TYPE 2 DIABETES MELLITUS WITH STAGE 4 CHRONIC KIDNEY DISEASE, WITH LONG-TERM CURRENT USE OF INSULIN (HCC): Primary | ICD-10-CM

## 2022-06-21 DIAGNOSIS — E55.9 VITAMIN D DEFICIENCY: ICD-10-CM

## 2022-06-21 DIAGNOSIS — N18.4 TYPE 2 DIABETES MELLITUS WITH STAGE 4 CHRONIC KIDNEY DISEASE, WITH LONG-TERM CURRENT USE OF INSULIN (HCC): Primary | ICD-10-CM

## 2022-06-21 PROCEDURE — 99214 OFFICE O/P EST MOD 30 MIN: CPT | Performed by: NURSE PRACTITIONER

## 2022-06-21 RX ORDER — NATEGLINIDE 120 MG/1
120 TABLET ORAL
Qty: 270 TABLET | Refills: 1 | Status: SHIPPED | OUTPATIENT
Start: 2022-06-21 | End: 2022-08-09 | Stop reason: SDUPTHER

## 2022-06-21 RX ORDER — AMMONIUM LACTATE 12 G/100G
CREAM TOPICAL
COMMUNITY

## 2022-06-21 NOTE — TELEPHONE ENCOUNTER
----- Message from Toshia Phillips sent at 6/21/2022 10:01 AM EDT -----  Regarding: diabetic exam  06/21/22 10:02 AM    Anya, our patient Tamara Neal has had Diabetic Eye Exam completed/performed   Please assist in updating the patient chart by making an External outreach to [facility] located in [place] The date of service is January 2022    Thank you,  Toshia Phillips  [facility] [place] [id number]

## 2022-06-21 NOTE — TELEPHONE ENCOUNTER
Upon review of the In Basket request and the patient's chart, initial outreach has been made via telephone call, please see Contacts section for details       Thank you  Chapo Romero

## 2022-06-21 NOTE — ASSESSMENT & PLAN NOTE
Patient's control has worsened  He is having more frequent episodes of hyperglycemia  Increase Ozempic to 1 mg once weekly  Resume nateglinide 120 mg 3 times daily prior to meals  Start [place]  This will treat both his hyperglycemia and his albuminuria  Patient's nephrologist would also like him to be on an SGL T2   Patient is to notify me should he receive any difficulty with his insurance company  Continue to test blood sugar 3 times daily  Continue with 46 units of Toujeo nightly  Ideally, the increase in Ozempic will offer a decrease in basal insulin  Continue to follow a healthy diet  Continue to engage in physical activity as tolerated  Patient is to notify me should he decide to have another epidural injection in which case we can temporarily increase his basal insulin  Check labs prior to next appointment in 3-4 months    Lab Results   Component Value Date    HGBA1C 8 2 (H) 06/16/2022

## 2022-06-22 ENCOUNTER — TELEPHONE (OUTPATIENT)
Dept: DIABETES SERVICES | Facility: CLINIC | Age: 67
End: 2022-06-22

## 2022-06-22 DIAGNOSIS — Z79.4 TYPE 2 DIABETES MELLITUS WITH STAGE 4 CHRONIC KIDNEY DISEASE, WITH LONG-TERM CURRENT USE OF INSULIN (HCC): ICD-10-CM

## 2022-06-22 DIAGNOSIS — E11.22 TYPE 2 DIABETES MELLITUS WITH STAGE 4 CHRONIC KIDNEY DISEASE, WITH LONG-TERM CURRENT USE OF INSULIN (HCC): ICD-10-CM

## 2022-06-22 DIAGNOSIS — E66.01 SEVERE OBESITY (BMI >= 40) (HCC): ICD-10-CM

## 2022-06-22 DIAGNOSIS — N18.4 TYPE 2 DIABETES MELLITUS WITH STAGE 4 CHRONIC KIDNEY DISEASE, WITH LONG-TERM CURRENT USE OF INSULIN (HCC): ICD-10-CM

## 2022-06-22 NOTE — TELEPHONE ENCOUNTER
Called to state he did not get your medication and cost  Wants to speak to [facility]

## 2022-06-22 NOTE — TELEPHONE ENCOUNTER
Spoke to patient he will call [employer] [facility] as the medication was already approved, however I think he needs to let [employer] know he has PACE

## 2022-06-23 NOTE — TELEPHONE ENCOUNTER
Upon review of the In Basket request we were able to locate, review, and update the patient chart as requested for Diabetic Eye Exam     Any additional questions or concerns should be emailed to the Practice Liaisons via [email or web address] email, please do not reply via In Basket      Thank you  Luana Jeffery I have reviewed and confirmed nurses' notes...

## 2022-06-27 ENCOUNTER — TELEPHONE (OUTPATIENT)
Dept: ENDOCRINOLOGY | Facility: CLINIC | Age: 67
End: 2022-06-27

## 2022-06-29 NOTE — TELEPHONE ENCOUNTER
Spoke to patient reviewed his labs and got his PACE id for his ozempic will call today to get overide made

## 2022-07-06 NOTE — TELEPHONE ENCOUNTER
Pt called for refills of Emptagliflozin and to also check on the status of the override for Ozempic? He stated this is a total mess and it is been going on for months now

## 2022-07-07 NOTE — TELEPHONE ENCOUNTER
Voicemail left for patient asking for a phone call back  Looking into things, his Viktor Carpenter was sent to [email or web address] mail order, he can call them to figure out exaclty what is going on with that  As for his Ozempic, we do need his PACE ID number and the phone number on the back of the pace card

## 2022-07-08 NOTE — TELEPHONE ENCOUNTER
Pt returned phone call      PACE ID# [id number]    Lulu 25- [phone]    Pt aware next time we will scan the card to his chart

## 2022-07-14 NOTE — TELEPHONE ENCOUNTER
Spoke [facility] patients Ozempic was shipped today   Pace [facility] with approval reference number [id number] and they processed the order immediatley

## 2022-07-25 ENCOUNTER — TELEPHONE (OUTPATIENT)
Dept: NEPHROLOGY | Facility: CLINIC | Age: 67
End: 2022-07-25

## 2022-08-03 ENCOUNTER — OFFICE VISIT (OUTPATIENT)
Dept: NEPHROLOGY | Facility: CLINIC | Age: 67
End: 2022-08-03
Payer: MEDICARE

## 2022-08-03 VITALS
WEIGHT: 244.4 LBS | OXYGEN SATURATION: 97 % | DIASTOLIC BLOOD PRESSURE: 74 MMHG | HEART RATE: 77 BPM | BODY MASS INDEX: 34.99 KG/M2 | HEIGHT: 70 IN | SYSTOLIC BLOOD PRESSURE: 130 MMHG

## 2022-08-03 DIAGNOSIS — N18.9 CHRONIC KIDNEY DISEASE-MINERAL AND BONE DISORDER: ICD-10-CM

## 2022-08-03 DIAGNOSIS — E11.22 TYPE 2 DIABETES MELLITUS WITH STAGE 4 CHRONIC KIDNEY DISEASE, WITH LONG-TERM CURRENT USE OF INSULIN (HCC): ICD-10-CM

## 2022-08-03 DIAGNOSIS — I10 PRIMARY HYPERTENSION: Primary | ICD-10-CM

## 2022-08-03 DIAGNOSIS — Z79.4 TYPE 2 DIABETES MELLITUS WITH STAGE 4 CHRONIC KIDNEY DISEASE, WITH LONG-TERM CURRENT USE OF INSULIN (HCC): ICD-10-CM

## 2022-08-03 DIAGNOSIS — I12.9 HYPERTENSIVE CHRONIC KIDNEY DISEASE WITH STAGE 1 THROUGH STAGE 4 CHRONIC KIDNEY DISEASE, OR UNSPECIFIED CHRONIC KIDNEY DISEASE: ICD-10-CM

## 2022-08-03 DIAGNOSIS — R80.1 PERSISTENT PROTEINURIA: ICD-10-CM

## 2022-08-03 DIAGNOSIS — E83.9 CHRONIC KIDNEY DISEASE-MINERAL AND BONE DISORDER: ICD-10-CM

## 2022-08-03 DIAGNOSIS — N18.4 TYPE 2 DIABETES MELLITUS WITH STAGE 4 CHRONIC KIDNEY DISEASE, WITH LONG-TERM CURRENT USE OF INSULIN (HCC): ICD-10-CM

## 2022-08-03 DIAGNOSIS — M89.9 CHRONIC KIDNEY DISEASE-MINERAL AND BONE DISORDER: ICD-10-CM

## 2022-08-03 DIAGNOSIS — N25.81 SECONDARY HYPERPARATHYROIDISM OF RENAL ORIGIN (HCC): ICD-10-CM

## 2022-08-03 DIAGNOSIS — N18.4 CKD (CHRONIC KIDNEY DISEASE), STAGE IV (HCC): ICD-10-CM

## 2022-08-03 PROCEDURE — 99214 OFFICE O/P EST MOD 30 MIN: CPT | Performed by: PHYSICIAN ASSISTANT

## 2022-08-03 NOTE — ASSESSMENT & PLAN NOTE
Will follow with initiation of Jardiance SGLT 2 inhibitor  Not on an Ace or Arb given advanced renal disease

## 2022-08-03 NOTE — ASSESSMENT & PLAN NOTE
Lab Results   Component Value Date    EGFR 27 09/20/2021    EGFR 23 07/31/2021    EGFR 27 03/16/2020    CREATININE 2 37 (H) 09/20/2021    CREATININE 2 74 (H) 07/31/2021    CREATININE 2 41 (H) 03/16/2020   Personally reviewed graph of renal function trends with patient, which showed that renal function has been stable over the past 2 years  Patient is not on an ACE-I, ARB  He started [place]  Patient was advised to avoid nephrotoxins  Continue management of healthy weight, diabetes and hypertension  Improved glycemic control  Please renally dose medication for a creatinine clearance of 25 mL/min  Patient has undergone Kidney Smart education  Advised patient to hold diuretic hydrochlorothiazide and empagliflozin if he develops poor p o  Intake with GI losses  Continue to monitor renal function, anemia and bone-mineral parameters

## 2022-08-03 NOTE — ASSESSMENT & PLAN NOTE
Lab Results   Component Value Date    HGBA1C 8 2 (H) 06/16/2022   His past A1c proves that he can take massive improvement with dedicated low carbohydrate dietary modification  We discussed renew dietary efforts

## 2022-08-03 NOTE — PATIENT INSTRUCTIONS
For the prevention of kidney injury: If you are sick and not eating well or drinking well OR vomiting or having diarrhea, temporarily hold your ACE-inhibitor / ARB / diuretic (HYDROCHLOROTHIAZIDE) while sick and call office for further instructions  Otherwise, please take medications as prescribed

## 2022-08-03 NOTE — ASSESSMENT & PLAN NOTE
Continue to monitor PTH, phosphorus, vitamin-D parameters  May continue cholecalciferol 1000 units daily  No indication for activated vitamin-D agent

## 2022-08-03 NOTE — PROGRESS NOTES
Assessment & Plan:    1  Primary hypertension  Assessment & Plan:  Blood pressure goal   Continue current antihypertensive regimen and low-sodium diet  2  CKD (chronic kidney disease), stage IV [facility])  Assessment & Plan:  Lab Results   Component Value Date    EGFR 27 09/20/2021    EGFR 23 07/31/2021    EGFR 27 03/16/2020    CREATININE 2 37 (H) 09/20/2021    CREATININE 2 74 (H) 07/31/2021    CREATININE 2 41 (H) 03/16/2020   Personally reviewed graph of renal function trends with patient, which showed that renal function has been stable over the past 2 years  Patient is not on an ACE-I, ARB  He started [place]  Patient was advised to avoid nephrotoxins  Continue management of healthy weight, diabetes and hypertension  Improved glycemic control  Please renally dose medication for a creatinine clearance of 25 mL/min  Patient has undergone [facility] education  Advised patient to hold diuretic hydrochlorothiazide and empagliflozin if he develops poor p o  Intake with GI losses  Continue to monitor renal function, anemia and bone-mineral parameters  Orders:  -     CBC and differential; Future; Expected date: 11/28/2022  -     Comprehensive metabolic panel; Future; Expected date: 11/28/2022  -     Magnesium; Future; Expected date: 11/28/2022  -     Microalbumin / creatinine urine ratio; Future; Expected date: 11/28/2022  -     Phosphorus; Future; Expected date: 11/28/2022  -     Protein / creatinine ratio, urine; Future; Expected date: 11/28/2022  -     PTH, intact; Future; Expected date: 11/28/2022  -     Urinalysis with microscopic; Future; Expected date: 11/28/2022  -     Vitamin D 25 hydroxy; Future; Expected date: 11/28/2022    3  Hypertensive chronic kidney disease with stage 1 through stage 4 chronic kidney disease, or unspecified chronic kidney disease    4  Chronic kidney disease-mineral and bone disorder  Assessment & Plan:  Continue to monitor PTH, phosphorus, vitamin-D parameters    May continue cholecalciferol 1000 units daily  No indication for activated vitamin-D agent  Orders:  -     Phosphorus; Future; Expected date: 11/28/2022  -     PTH, intact; Future; Expected date: 11/28/2022  -     Vitamin D 25 hydroxy; Future; Expected date: 11/28/2022    5  Persistent proteinuria  Assessment & Plan: Will follow with initiation of Jardiance SGLT 2 inhibitor  Not on an Ace or Arb given advanced renal disease  Orders:  -     Microalbumin / creatinine urine ratio; Future; Expected date: 11/28/2022  -     Protein / creatinine ratio, urine; Future; Expected date: 11/28/2022    6  Secondary hyperparathyroidism of renal origin ([facility] [address] )    7  Type 2 diabetes mellitus with stage 4 chronic kidney disease, with long-term current use of insulin ([facility])  Assessment & Plan:    Lab Results   Component Value Date    HGBA1C 8 2 (H) 06/16/2022   His past A1c proves that he can take massive improvement with dedicated low carbohydrate dietary modification  We discussed renew dietary efforts  The benefits, risks and alternatives to the treatment plan were discussed at this visit  Patient was advised of common adverse effects of any medical therapies prescribed  All questions were answered and discussed with the patient and any accompanying family members or caretakers  Subjective:      Patient ID: Jose Hurtado is a 69 y o  male seen in the [place] office  Patient is followed by Dr Jyotsna Hammond for management of stage 4 chronic kidney disease  He had a left nephrectomy June 2019  HPI     Today, patient presents for routine follow-up without acute complaints relating to blood pressure, edema or nephrological concerns  He has pain LLQ near site of incision for years  He report extensive workup and no explanation  Patient denies any changes in health, medication changes, hospitalizations, surgeries or trip to the emergency room, falls or bone fractures since last visit      Blood pressure is 130/74 HR 77   Patient does monitor blood pressures at home and reports no issues  Denies headaches, lightheadedness, dizziness  Patient reports adherence with antihypertensive regimen and denies adverse effects:  Amlodipine 10 mg daily, isosorbide mononitrate 60 mg daily, hydrochlorothiazide 20 mg daily, metoprolol tartrate 50 mg every 12 hours  Patient denies lower extremity swelling  Reviewed and discussed the results of labs performed 06/16/2022 which revealed a creatinine 2 65 mg/dL with estimated GFR 25 mL/min  Worsened from creatinine 2 3 mg/dL with estimated GFR 30 mL/min on 03/28/2022  And from a creatinine 2 0 mg/dL with estimated GFR 33 mL/min on 01/15/2022  However, this is overall stable when compared with prior GFR 25 mL/min on 01/31/2020  History is obtained from patient  The following portions of the patient's history were reviewed and updated as appropriate: allergies, current medications, past family history, past medical history, past social history, past surgical history, and problem list     Review of Systems   Constitutional: Negative for activity change, chills, diaphoresis, fatigue and fever  HENT: Negative for mouth sores and trouble swallowing  Respiratory: Negative for apnea, cough, chest tightness, shortness of breath and wheezing  Cardiovascular: Negative for chest pain, palpitations and leg swelling  Gastrointestinal: Negative for abdominal distention, abdominal pain, blood in stool, constipation, diarrhea and nausea  Genitourinary: Positive for urgency  Negative for decreased urine volume, difficulty urinating, dysuria, enuresis, frequency and hematuria  Musculoskeletal: Positive for back pain  Negative for arthralgias and joint swelling  Skin: Negative for pallor, rash and wound  Neurological: Negative for dizziness, seizures, light-headedness, numbness and headaches  Hematological: Does not bruise/bleed easily     Psychiatric/Behavioral: Negative for agitation, behavioral problems and confusion  The patient is not nervous/anxious  Objective:      /74   Pulse 77   Ht 5' 10" (1 778 m)   Wt 111 kg (244 lb 6 4 oz)   SpO2 97%   BMI 35 07 kg/m²          Physical Exam  Vitals and nursing note reviewed  Constitutional:       General: He is awake  He is not in acute distress  Appearance: Normal appearance  He is well-developed and normal weight  He is not ill-appearing, toxic-appearing or diaphoretic  HENT:      Head: Normocephalic and atraumatic  Jaw: There is normal jaw occlusion  Nose: Nose normal       Mouth/Throat:      Mouth: Mucous membranes are moist       Pharynx: Oropharynx is clear  No oropharyngeal exudate or posterior oropharyngeal erythema  Eyes:      General: Lids are normal  Vision grossly intact  Gaze aligned appropriately  No scleral icterus  Right eye: No discharge  Left eye: No discharge  Extraocular Movements: Extraocular movements intact  Conjunctiva/sclera: Conjunctivae normal       Pupils: Pupils are equal, round, and reactive to light  Neck:      Thyroid: No thyroid mass or thyromegaly  Trachea: Trachea and phonation normal    Cardiovascular:      Rate and Rhythm: Normal rate and regular rhythm  Heart sounds: Normal heart sounds, S1 normal and S2 normal  No murmur heard  No friction rub  No gallop  Pulmonary:      Effort: Pulmonary effort is normal  No respiratory distress  Breath sounds: Normal breath sounds  No stridor  No wheezing, rhonchi or rales  Abdominal:      General: Abdomen is flat  Bowel sounds are normal  There is no distension  Palpations: Abdomen is soft  There is no mass  Tenderness: There is no abdominal tenderness  There is no guarding  Hernia: No hernia is present  Musculoskeletal:         General: Normal range of motion  Cervical back: Normal range of motion and neck supple  No rigidity or tenderness        Right lower leg: No edema  Left lower leg: No edema  Lymphadenopathy:      Cervical: No cervical adenopathy  Skin:     General: Skin is warm and dry  Coloration: Skin is not jaundiced  Findings: No bruising  Nails: There is no clubbing  Neurological:      General: No focal deficit present  Mental Status: He is alert and oriented to person, place, and time  Mental status is at baseline  Psychiatric:         Attention and Perception: Attention and perception normal          Mood and Affect: Mood and affect normal          Speech: Speech normal          Behavior: Behavior normal  Behavior is cooperative  Thought Content:  Thought content normal          Judgment: Judgment normal              Lab Results   Component Value Date     06/19/2018    SODIUM 139 09/20/2021    K 4 2 09/20/2021     09/20/2021    CO2 29 09/20/2021    ANIONGAP 10 3 06/19/2018    AGAP 8 09/20/2021    BUN 37 (H) 09/20/2021    CREATININE 2 37 (H) 09/20/2021    GLUC 146 (H) 07/31/2021    GLUF 146 (H) 09/20/2021    CALCIUM 9 5 09/20/2021    AST 48 (H) 07/31/2021    ALT 43 07/31/2021    ALKPHOS 37 (L) 07/31/2021    PROT 6 6 04/11/2018    TP 8 0 07/31/2021    BILITOT 0 7 04/11/2018    TBILI 0 40 07/31/2021    EGFR 27 09/20/2021      Lab Results   Component Value Date    CREATININE 2 37 (H) 09/20/2021    CREATININE 2 74 (H) 07/31/2021    CREATININE 2 41 (H) 03/16/2020    CREATININE 2 53 (H) 01/31/2020    CREATININE 2 06 (H) 06/16/2019    CREATININE 1 49 (H) 05/06/2019    CREATININE 1 47 (H) 07/31/2018    CREATININE 1 31 (H) 06/19/2018    CREATININE 1 55 (H) 05/15/2018    CREATININE 1 02 04/11/2018    CREATININE 1 11 04/10/2018    CREATININE 1 42 (H) 04/09/2018    CREATININE 1 86 (H) 04/08/2018    CREATININE 2 33 (H) 04/07/2018    CREATININE 2 49 (H) 04/06/2018      Lab Results   Component Value Date    COLORU Yellow 07/31/2021    CLARITYU Clear 07/31/2021    SPECGRAV 1 025 07/31/2021    PHUR 6 0 07/31/2021 LEUKOCYTESUR Negative 07/31/2021    NITRITE Negative 07/31/2021    PROTEIN UA 2+ (A) 07/31/2021    GLUCOSEU Negative 07/31/2021    KETONESU Negative 07/31/2021    UROBILINOGEN 0 2 07/31/2021    BILIRUBINUR Negative 07/31/2021    BLOODU 1+ (A) 07/31/2021    RBCUA 1-2 07/31/2021    WBCUA 0-1 07/31/2021    EPIS None Seen 07/31/2021    BACTERIA Occasional 07/31/2021      No results found for: LABPROT  No results found for: Helen Lucas Results   Component Value Date    WBC 3 90 (L) 07/31/2021    HGB 12 8 (L) 07/31/2021    HCT 38 6 (L) 07/31/2021    MCV 79 (L) 07/31/2021     07/31/2021      Lab Results   Component Value Date    HGB 12 8 (L) 07/31/2021    HGB 13 6 (L) 03/16/2020    HGB 12 3 (L) 06/16/2019    HGB 14 7 05/06/2019    HGB 14 6 07/31/2018      No results found for: IRON, TIBC, FERRITIN   No results found for: PTHCALCIUM, [id number], PHOSPHORUS   Lab Results   Component Value Date    CHOLESTEROL 134 03/16/2020    HDL 35 (L) 03/16/2020    LDLCALC 72 03/16/2020    TRIG 136 03/16/2020      Lab Results   Component Value Date    URICACID 7 4 03/16/2020      Lab Results   Component Value Date    HGBA1C 8 2 (H) 06/16/2022      No results found for: TSHANTIBODY, C1XOMPP, FREET4   No results found for: BRIDGET, DSDNAAB, RFIGM   Lab Results   Component Value Date    PROT 6 6 04/11/2018        Portions of the record may have been created with voice recognition software  Occasional wrong word or "sound a like" substitutions may have occurred due to the inherent limitations of voice recognition software  Read the chart carefully and recognize, using context, where substitutions have occurred  If you have any questions, please contact the dictating provider

## 2022-08-09 DIAGNOSIS — N18.4 TYPE 2 DIABETES MELLITUS WITH STAGE 4 CHRONIC KIDNEY DISEASE, WITH LONG-TERM CURRENT USE OF INSULIN (HCC): ICD-10-CM

## 2022-08-09 DIAGNOSIS — Z79.4 TYPE 2 DIABETES MELLITUS WITH STAGE 4 CHRONIC KIDNEY DISEASE, WITH LONG-TERM CURRENT USE OF INSULIN (HCC): ICD-10-CM

## 2022-08-09 DIAGNOSIS — E11.22 TYPE 2 DIABETES MELLITUS WITH STAGE 4 CHRONIC KIDNEY DISEASE, WITH LONG-TERM CURRENT USE OF INSULIN (HCC): ICD-10-CM

## 2022-08-09 RX ORDER — NATEGLINIDE 120 MG/1
120 TABLET ORAL
Qty: 270 TABLET | Refills: 1 | Status: SHIPPED | OUTPATIENT
Start: 2022-08-09

## 2022-08-15 ENCOUNTER — TELEPHONE (OUTPATIENT)
Dept: ENDOCRINOLOGY | Facility: CLINIC | Age: 67
End: 2022-08-15

## 2022-08-15 NOTE — TELEPHONE ENCOUNTER
Pt stopped into the office  He stated he is experiencing a side affect from the [address]  He stated he is experiencing inflammation and some bleeding between his testicles and anus  Pt was wondering if he should stop the medication?

## 2022-08-16 NOTE — TELEPHONE ENCOUNTER
Spoke to patient he will stop jardiance keep a log of his sugars and send back into the office for further review

## 2022-09-16 ENCOUNTER — TELEPHONE (OUTPATIENT)
Dept: ENDOCRINOLOGY | Facility: CLINIC | Age: 67
End: 2022-09-16

## 2022-09-16 NOTE — TELEPHONE ENCOUNTER
I recommend increasing his Toujeo to 52 units for now  Does he feel like he may have a bug or infection? Any changes to the diet? We will be able to dig deeper into it when I see him but I think the slight increase in basal insulin should help him for now

## 2022-09-16 NOTE — TELEPHONE ENCOUNTER
Patient called concerned his blood sugar has been on the higher side  Reports staying in the upper 200's yesterday and then 174 today  We did take him of Jardiance as it was causing very painful yeast infections for him   He does have an upcoming appt in October, please advise

## 2022-09-26 ENCOUNTER — APPOINTMENT (OUTPATIENT)
Dept: LAB | Facility: HOSPITAL | Age: 67
End: 2022-09-26
Payer: MEDICARE

## 2022-09-26 ENCOUNTER — HOSPITAL ENCOUNTER (OUTPATIENT)
Dept: CT IMAGING | Facility: HOSPITAL | Age: 67
Discharge: HOME | End: 2022-09-26
Payer: MEDICARE

## 2022-09-26 DIAGNOSIS — Z12.5 SPECIAL SCREENING FOR MALIGNANT NEOPLASM OF PROSTATE: ICD-10-CM

## 2022-09-26 DIAGNOSIS — C64.2 MALIGNANT NEOPLASM OF LEFT KIDNEY, EXCEPT RENAL PELVIS (HCC): ICD-10-CM

## 2022-09-26 LAB
ANION GAP SERPL CALCULATED.3IONS-SCNC: 8 MMOL/L (ref 4–13)
BUN SERPL-MCNC: 33 MG/DL (ref 5–25)
CALCIUM SERPL-MCNC: 9.6 MG/DL (ref 8.4–10.2)
CHLORIDE SERPL-SCNC: 102 MMOL/L (ref 96–108)
CO2 SERPL-SCNC: 29 MMOL/L (ref 21–32)
CREAT SERPL-MCNC: 2.59 MG/DL (ref 0.6–1.3)
GFR SERPL CREATININE-BSD FRML MDRD: 24 ML/MIN/1.73SQ M
GLUCOSE P FAST SERPL-MCNC: 161 MG/DL (ref 65–99)
POTASSIUM SERPL-SCNC: 3.9 MMOL/L (ref 3.5–5.3)
PSA SERPL-MCNC: 0.7 NG/ML (ref 0–4)
SODIUM SERPL-SCNC: 139 MMOL/L (ref 135–147)

## 2022-09-26 PROCEDURE — 80048 BASIC METABOLIC PNL TOTAL CA: CPT

## 2022-09-26 PROCEDURE — G1004 CDSM NDSC: HCPCS

## 2022-09-26 PROCEDURE — G0103 PSA SCREENING: HCPCS

## 2022-09-26 PROCEDURE — 74176 CT ABD & PELVIS W/O CONTRAST: CPT

## 2022-09-26 PROCEDURE — 36415 COLL VENOUS BLD VENIPUNCTURE: CPT

## 2022-09-29 ENCOUNTER — TELEPHONE (OUTPATIENT)
Dept: ENDOCRINOLOGY | Facility: CLINIC | Age: 67
End: 2022-09-29

## 2022-09-29 DIAGNOSIS — E11.22 TYPE 2 DIABETES MELLITUS WITH STAGE 4 CHRONIC KIDNEY DISEASE, WITH LONG-TERM CURRENT USE OF INSULIN (HCC): ICD-10-CM

## 2022-09-29 DIAGNOSIS — Z79.4 TYPE 2 DIABETES MELLITUS WITH STAGE 4 CHRONIC KIDNEY DISEASE, WITH LONG-TERM CURRENT USE OF INSULIN (HCC): ICD-10-CM

## 2022-09-29 DIAGNOSIS — N18.4 TYPE 2 DIABETES MELLITUS WITH STAGE 4 CHRONIC KIDNEY DISEASE, WITH LONG-TERM CURRENT USE OF INSULIN (HCC): ICD-10-CM

## 2022-09-29 RX ORDER — INSULIN GLARGINE 300 U/ML
46 INJECTION, SOLUTION SUBCUTANEOUS DAILY
Qty: 15 ML | Refills: 0 | Status: SHIPPED | OUTPATIENT
Start: 2022-09-29 | End: 2022-12-28

## 2022-09-29 NOTE — TELEPHONE ENCOUNTER
Pt called for a refill of Toujeo  He would like to go to [facility]  He no longer wants mail order, would like it removed from his chart as he is NOT giving his personal credit card numbers over the phone to mail order persons for payment  Pt stated he does not care what the cost is as it is going to be paid for in full at [facility]     He stated he would like a call when this has been sent to his pharmacy so he will know when to pick it up

## 2022-10-21 NOTE — PROGRESS NOTES
Established Patient Progress Note      No chief complaint on file  History of Present Illness:   Carmen Manning is a 69 y o  male with a history of type *** diabetes {insulin:59261760} since ***  Reports complications of ***  Denies recent illness or hospitalizations  Denies recent severe hypoglycemic or severe hyperglycemic episodes  Denies any issues with his current regimen  home glucose monitoring: {Home testin}    Since last appointment, Cleven Ege was discontinued as patient reported symptoms of a yeast infection  Home blood glucose readings:   Before breakfast: ***  Before lunch: ***  Before dinner: ***  Bedtime: ***     Current regimen: Toujeo 46 units nightly- increased to 52 units  Nateglinide 120 mg TID  Ozempic 1 mg once weekly    Last Eye Exam: ***  Last Foot Exam: ***    For for hyperlipidemia, he is taking 80 mg of atorvastatin nightly  He denies myalgias  For hypertension, he is taking 10 mg of amlodipine, 25 mg of hydrochlorothiazide, 60 mg of isosorbide mononitrate daily and 50 mg of metoprolol tartrate twice daily  He denies headache, pedal edema, and orthostatic hypotension      Patient Active Problem List   Diagnosis   • Acquired absence of kidney   • Pure hypercholesterolemia   • 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 ([facility] [address] )   • Hypertension   • SOB (shortness of breath)   • Hearing loss due to cerumen impaction, bilateral   • CKD (chronic kidney disease), stage IV (HCC)   • Hypertensive chronic kidney disease with stage 1 through stage 4 chronic kidney disease, or unspecified chronic kidney disease   • Coronary artery disease involving native coronary artery without angina pectoris   • Degeneration of lumbosacral intervertebral disc   • Lumbar radiculopathy   • Nephrolithiasis   • Diabetes mellitus (HCC)   • Chronic kidney disease-mineral and bone disorder   • Class 2 severe obesity due to excess calories with serious comorbidity and body mass index (BMI) of 35 0 to 35 9 in adult [facility])   • Vitamin D deficiency   • Elevated uric acid in blood   • Chronic knee pain   • Neck pain   • Persistent proteinuria      Past Medical History:   Diagnosis Date   • Angina pectoris, unspecified ([facility] )    • Cancer of kidney ([facility] ) 2019   • Chronic kidney disease, stage 2 (mild)    • Chronic kidney disease, stage 3 (HCC)    • Diabetes mellitus ([facility] )    • Hypercholesterolemia    • Hypertension    • Nephrolithiasis     calcium oxalate - had lithotripsy    • Seasonal allergies    • Stage 3 chronic kidney disease ([facility] ) 11/14/2019      Past Surgical History:   Procedure Laterality Date   • ARTHROSCOPY KNEE Bilateral    • CARDIAC SURGERY      balloon procedure and stent placement    • FOOT SURGERY Right     implants   • LAMINECTOMY     • NEPHRECTOMY Left 06/2019   • PARTIAL HIP ARTHROPLASTY Right    • ROTATOR CUFF REPAIR     • SHOULDER ARTHROSCOPY Right    • SHOULDER SURGERY Left     removal of bone spur      Family History   Problem Relation Age of Onset   • Diabetes Mother    • Heart attack Father    • Heart disease Father      Social History     Tobacco Use   • Smoking status: Never Smoker   • Smokeless tobacco: Never Used   Substance Use Topics   • Alcohol use: No     Allergies   Allergen Reactions   • Percocet [Oxycodone-Acetaminophen]    • Tetracycline          Current Outpatient Medications:   •  albuterol (PROVENTIL HFA,VENTOLIN HFA) 90 mcg/act inhaler, Inhale 2 puffs every 4 (four) hours as needed, Disp: , Rfl:   •  amLODIPine (NORVASC) 10 mg tablet, , Disp: , Rfl:   •  ammonium lactate (LAC-HYDRIN) 12 % cream, ammonium lactate 12 % topical cream, Disp: , Rfl:   •  aspirin 81 mg chewable tablet, Chew 81 mg, Disp: , Rfl:   •  atorvastatin (LIPITOR) 80 mg tablet, Take by mouth, Disp: , Rfl:   •  cholecalciferol (VITAMIN D3) 1,000 units tablet, Take 1 tablet (1,000 Units total) by mouth daily, Disp: 90 tablet, Rfl: 1  •  diclofenac sodium (VOLTAREN) 1 %, apply 2 grams to affected area four times a day, Disp: , Rfl:   •  fenofibrate (TRICOR) 54 MG tablet, Take 54 mg by mouth daily, Disp: , Rfl:   •  fluticasone (FLONASE) 50 mcg/act nasal spray, 2 sprays into each nostril daily, Disp: 16 g, Rfl: 3  •  glucose blood (OneTouch Verio) test strip, Use to test blood sugar three times daily  , Disp: 200 each, Rfl: 3  •  hydrochlorothiazide (HYDRODIURIL) 25 mg tablet, , Disp: , Rfl:   •  hydrocortisone 2 5 % cream, as needed , Disp: , Rfl:   •  insulin glargine (Toujeo SoloStar) 300 units/mL CONCENTRATED U-300 injection pen (1-unit dial), Inject 46 Units under the skin daily, Disp: 15 mL, Rfl: 0  •  isosorbide mononitrate (IMDUR) 60 mg 24 hr tablet, Take 60 mg by mouth Take 1/2 tablet by mouth bid, Disp: , Rfl:   •  MAGNESIUM PO, Take 500 mg by mouth, Disp: , Rfl:   •  methocarbamol (ROBAXIN) 500 mg tablet, Take 1 tablet (500 mg total) by mouth 4 (four) times a day (Patient not taking: No sig reported), Disp: 20 tablet, Rfl: 0  •  metoprolol tartrate (LOPRESSOR) 50 mg tablet, Take 50 mg by mouth every 12 (twelve) hours, Disp: , Rfl:   •  Multiple Vitamin (MULTIVITAMIN ADULT PO), Take by mouth, Disp: , Rfl:   •  nateglinide (STARLIX) 120 mg tablet, Take 1 tablet (120 mg total) by mouth 3 (three) times a day before meals, Disp: 270 tablet, Rfl: 1  •  nitroglycerin (NITROSTAT) 0 4 mg SL tablet, , Disp: , Rfl:   •  OneTouch Delica Lancets 58T MISC, Use to test blood sugar three times daily  , Disp: 200 each, Rfl: 3  •  pregabalin (LYRICA) 75 mg capsule, Take 75 mg by mouth 3 (three) times a day (Patient not taking: No sig reported), Disp: , Rfl:   •  rivaroxaban (XARELTO) 2 5 mg tablet, Take 2 5 mg by mouth 2 (two) times a day , Disp: , Rfl:   •  semaglutide, 1 mg/dose, (Ozempic) 4 MG/3ML SOPN injection pen, Inject 0 75 mL (1 mg total) under the skin once a week, Disp: 9 mL, Rfl: 1  •  sertraline (ZOLOFT) 50 mg tablet, Take 1 tablet (50 mg total) by mouth daily, Disp: 90 tablet, Rfl: 3  •  traZODone (DESYREL) 100 mg tablet, , Disp: , Rfl:     Review of Systems    Physical Exam:  There is no height or weight on file to calculate BMI  There were no vitals taken for this visit  Wt Readings from Last 3 Encounters:   09/12/22 111 kg (244 lb)   08/15/22 111 kg (244 lb)   08/03/22 111 kg (244 lb 6 4 oz)       Physical Exam  Diabetic Foot Exam    Labs:   Lab Results   Component Value Date    HGBA1C 8 2 (H) 06/16/2022    HGBA1C 5 9 (H) 11/10/2021    HGBA1C 5 9 11/10/2021     Lab Results   Component Value Date    CREATININE 2 59 (H) 09/26/2022    CREATININE 2 37 (H) 09/20/2021    CREATININE 2 74 (H) 07/31/2021    BUN 33 (H) 09/26/2022     06/19/2018    K 3 9 09/26/2022     09/26/2022    CO2 29 09/26/2022     eGFR   Date Value Ref Range Status   09/26/2022 24 ml/min/1 73sq m Final     Lab Results   Component Value Date    CHOL 140 05/15/2018    HDL 35 (L) 03/16/2020    TRIG 136 03/16/2020     Lab Results   Component Value Date    ALT 43 07/31/2021    AST 48 (H) 07/31/2021    ALKPHOS 37 (L) 07/31/2021    BILITOT 0 7 04/11/2018     Lab Results   Component Value Date    [id number] 2 740 05/06/2019     No results found for: FREET4, TSI    Impression & Plan:    Problem List Items Addressed This Visit    None         No orders of the defined types were placed in this encounter  There are no Patient Instructions on file for this visit  Discussed with the patient and all questioned fully answered  He will call me if any problems arise  Follow-up appointment in *** months       Counseled patient on diagnostic results, prognosis, risk and benefit of treatment options, instruction for management, importance of treatment compliance, Risk  factor reduction and impressions    TORREY Kirkland

## 2022-10-24 ENCOUNTER — APPOINTMENT (OUTPATIENT)
Dept: LAB | Facility: HOSPITAL | Age: 67
End: 2022-10-24
Payer: MEDICARE

## 2022-10-24 DIAGNOSIS — N18.4 TYPE 2 DIABETES MELLITUS WITH STAGE 4 CHRONIC KIDNEY DISEASE, WITH LONG-TERM CURRENT USE OF INSULIN (HCC): ICD-10-CM

## 2022-10-24 DIAGNOSIS — E11.22 TYPE 2 DIABETES MELLITUS WITH STAGE 4 CHRONIC KIDNEY DISEASE, WITH LONG-TERM CURRENT USE OF INSULIN (HCC): ICD-10-CM

## 2022-10-24 DIAGNOSIS — Z79.4 TYPE 2 DIABETES MELLITUS WITH STAGE 4 CHRONIC KIDNEY DISEASE, WITH LONG-TERM CURRENT USE OF INSULIN (HCC): ICD-10-CM

## 2022-10-24 LAB
CHOLEST SERPL-MCNC: 113 MG/DL
EST. AVERAGE GLUCOSE BLD GHB EST-MCNC: 183 MG/DL
HBA1C MFR BLD: 8 %
HDLC SERPL-MCNC: 34 MG/DL
LDLC SERPL CALC-MCNC: 54 MG/DL (ref 0–100)
TRIGL SERPL-MCNC: 126 MG/DL

## 2022-10-24 PROCEDURE — 80061 LIPID PANEL: CPT

## 2022-10-24 PROCEDURE — 36415 COLL VENOUS BLD VENIPUNCTURE: CPT

## 2022-10-24 PROCEDURE — 83036 HEMOGLOBIN GLYCOSYLATED A1C: CPT

## 2022-10-25 ENCOUNTER — OFFICE VISIT (OUTPATIENT)
Dept: ENDOCRINOLOGY | Facility: CLINIC | Age: 67
End: 2022-10-25
Payer: MEDICARE

## 2022-10-25 VITALS
TEMPERATURE: 98 F | HEIGHT: 70 IN | OXYGEN SATURATION: 99 % | DIASTOLIC BLOOD PRESSURE: 82 MMHG | WEIGHT: 246.6 LBS | SYSTOLIC BLOOD PRESSURE: 112 MMHG | BODY MASS INDEX: 35.3 KG/M2 | HEART RATE: 62 BPM

## 2022-10-25 DIAGNOSIS — N18.4 TYPE 2 DIABETES MELLITUS WITH STAGE 4 CHRONIC KIDNEY DISEASE, WITH LONG-TERM CURRENT USE OF INSULIN (HCC): Primary | ICD-10-CM

## 2022-10-25 DIAGNOSIS — I10 PRIMARY HYPERTENSION: ICD-10-CM

## 2022-10-25 DIAGNOSIS — E11.22 TYPE 2 DIABETES MELLITUS WITH STAGE 4 CHRONIC KIDNEY DISEASE, WITH LONG-TERM CURRENT USE OF INSULIN (HCC): Primary | ICD-10-CM

## 2022-10-25 DIAGNOSIS — Z79.4 TYPE 2 DIABETES MELLITUS WITH STAGE 4 CHRONIC KIDNEY DISEASE, WITH LONG-TERM CURRENT USE OF INSULIN (HCC): Primary | ICD-10-CM

## 2022-10-25 DIAGNOSIS — E78.00 PURE HYPERCHOLESTEROLEMIA: ICD-10-CM

## 2022-10-25 DIAGNOSIS — E55.9 VITAMIN D DEFICIENCY: ICD-10-CM

## 2022-10-25 DIAGNOSIS — R80.1 PERSISTENT PROTEINURIA: ICD-10-CM

## 2022-10-25 PROCEDURE — 99214 OFFICE O/P EST MOD 30 MIN: CPT | Performed by: STUDENT IN AN ORGANIZED HEALTH CARE EDUCATION/TRAINING PROGRAM

## 2022-10-25 NOTE — PROGRESS NOTES
Progress Note      Chief Complaint   Patient presents with   • Diabetes Type 2      Referring Provider  No referring provider defined for this encounter  History of Present Illness:   Carmen Manning is a 69 y o  male with a history of type 2 diabetes with long term use of insulin for more than 15-20 years who was seen by our nurse practitioner however she is out today,   Last seen in the office in June 2022,  Reports complications of neuropathy and Nephropathy  Denies recent illness or hospitalizations  Denies recent severe hypoglycemic or severe hyperglycemic episodes  Denies any issues with his current regimen  home glucose monitoring: are performed regularly      Current regimen: Toujeo 52 units before bedtime  Starlix 120 mg before each meal  Ozempic 1 mg once weekly        Home blood glucose readings:   Before breakfast: 130 - 150 mg/dl  Before lunch: x  Before dinner: x  Bedtime: <150    Hypoglycemic episodes:   H/o of hypoglycemia causing hospitalization or Intervention such as glucagon injection  or ambulance call : no  Hypoglycemia symptoms: brain fog , shaky  Treatment of hypoglycemia: juice     Diabetes education: YES, years ago   Diet: 3 meals per day, no snacks per day  Timing of meals: variable  diabetic diet compliance: no     Not physically active                Opthamology: eye land; no retinopathy, no macular edema  Podiatry: no  Infuenza vaccine: yes     Has hypertension: followed by PCP , amlodipine and HCTZ    Has hyperlipidemia: followed by PCP; on statin - tolerating well, no myalgias      Thyroid disorders: no  History of pancreatitis: no    Patient Active Problem List   Diagnosis   • Acquired absence of kidney   • Pure hypercholesterolemia   • 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 ([facility] )   • Hypertension   • SOB (shortness of breath)   • Hearing loss due to cerumen impaction, bilateral   • CKD (chronic kidney disease), stage IV (HCC)   • Hypertensive chronic kidney disease with stage 1 through stage 4 chronic kidney disease, or unspecified chronic kidney disease   • Coronary artery disease involving native coronary artery without angina pectoris   • Degeneration of lumbosacral intervertebral disc   • Lumbar radiculopathy   • Nephrolithiasis   • Diabetes mellitus ([facility] )   • Chronic kidney disease-mineral and bone disorder   • Class 2 severe obesity due to excess calories with serious comorbidity and body mass index (BMI) of 35 0 to 35 9 in adult [facility])   • Vitamin D deficiency   • Elevated uric acid in blood   • Chronic knee pain   • Neck pain   • Persistent proteinuria      Past Medical History:   Diagnosis Date   • Angina pectoris, unspecified ([facility] )    • Cancer of kidney ([facility] ) 2019   • Chronic kidney disease, stage 2 (mild)    • Chronic kidney disease, stage 3 (HCC)    • Diabetes mellitus ([facility] )    • Hypercholesterolemia    • Hypertension    • Nephrolithiasis     calcium oxalate - had lithotripsy    • Seasonal allergies    • Stage 3 chronic [facility] ) 11/14/2019      Past Surgical History:   Procedure Laterality Date   • ARTHROSCOPY KNEE Bilateral    • CARDIAC SURGERY      balloon procedure and stent placement    • FOOT SURGERY Right     implants   • LAMINECTOMY     • NEPHRECTOMY Left 06/2019   • PARTIAL HIP ARTHROPLASTY Right    • ROTATOR CUFF REPAIR     • SHOULDER ARTHROSCOPY Right    • SHOULDER SURGERY Left     removal of bone spur      Family History   Problem Relation Age of Onset   • Diabetes Mother    • Heart attack Father    • Heart disease Father      Social History     Tobacco Use   • Smoking status: Never Smoker   • Smokeless tobacco: Never Used   Substance Use Topics   • Alcohol use: No     Allergies   Allergen Reactions   • Percocet [Oxycodone-Acetaminophen]    • Tetracycline          Current Outpatient Medications:   •  albuterol (PROVENTIL HFA,VENTOLIN HFA) 90 mcg/act inhaler, Inhale 2 puffs every 4 (four) hours as needed, Disp: , Rfl:   • amLODIPine (NORVASC) 10 mg tablet, , Disp: , Rfl:   •  aspirin 81 mg chewable tablet, Chew 81 mg, Disp: , Rfl:   •  atorvastatin (LIPITOR) 80 mg tablet, Take by mouth, Disp: , Rfl:   •  cholecalciferol (VITAMIN D3) 1,000 units tablet, Take 1 tablet (1,000 Units total) by mouth daily, Disp: 90 tablet, Rfl: 1  •  diclofenac sodium (VOLTAREN) 1 %, apply 2 grams to affected area four times a day, Disp: , Rfl:   •  fenofibrate (TRICOR) 54 MG tablet, Take 54 mg by mouth daily, Disp: , Rfl:   •  fluticasone (FLONASE) 50 mcg/act nasal spray, 2 sprays into each nostril daily, Disp: 16 g, Rfl: 3  •  glucose blood (OneTouch Verio) test strip, Use to test blood sugar three times daily  , Disp: 200 each, Rfl: 3  •  hydrochlorothiazide (HYDRODIURIL) 25 mg tablet, , Disp: , Rfl:   •  insulin glargine (Toujeo SoloStar) 300 units/mL CONCENTRATED U-300 injection pen (1-unit dial), Inject 46 Units under the skin daily, Disp: 15 mL, Rfl: 0  •  isosorbide mononitrate (IMDUR) 60 mg 24 hr tablet, Take 60 mg by mouth Take 1/2 tablet by mouth bid, Disp: , Rfl:   •  MAGNESIUM PO, Take 500 mg by mouth, Disp: , Rfl:   •  metoprolol tartrate (LOPRESSOR) 50 mg tablet, Take 50 mg by mouth every 12 (twelve) hours, Disp: , Rfl:   •  Multiple Vitamin (MULTIVITAMIN ADULT PO), Take by mouth, Disp: , Rfl:   •  nateglinide (STARLIX) 120 mg tablet, Take 1 tablet (120 mg total) by mouth 3 (three) times a day before meals, Disp: 270 tablet, Rfl: 1  •  nitroglycerin (NITROSTAT) 0 4 mg SL tablet, , Disp: , Rfl:   •  OneTouch Delica Lancets 71I MISC, Use to test blood sugar three times daily  , Disp: 200 each, Rfl: 3  •  rivaroxaban (XARELTO) 2 5 mg tablet, Take 2 5 mg by mouth 2 (two) times a day , Disp: , Rfl:   •  semaglutide, 1 mg/dose, (Ozempic) 4 MG/3ML SOPN injection pen, Inject 0 75 mL (1 mg total) under the skin once a week, Disp: 9 mL, Rfl: 1  •  sertraline (ZOLOFT) 50 mg tablet, Take 1 tablet (50 mg total) by mouth daily, Disp: 90 tablet, Rfl: 3  • traZODone (DESYREL) 100 mg tablet, , Disp: , Rfl:   •  ammonium lactate (LAC-HYDRIN) 12 % cream, ammonium lactate 12 % topical cream (Patient not taking: Reported on 10/25/2022), Disp: , Rfl:   •  hydrocortisone 2 5 % cream, as needed  (Patient not taking: Reported on 10/25/2022), Disp: , Rfl:   •  methocarbamol (ROBAXIN) 500 mg tablet, Take 1 tablet (500 mg total) by mouth 4 (four) times a day (Patient not taking: No sig reported), Disp: 20 tablet, Rfl: 0  •  pregabalin (LYRICA) 75 mg capsule, Take 75 mg by mouth 3 (three) times a day (Patient not taking: No sig reported), Disp: , Rfl:   Review of Systems   Constitutional: Negative for activity change, appetite change, chills, diaphoresis, fatigue, fever and unexpected weight change  HENT: Negative for congestion, drooling, ear discharge, ear pain, trouble swallowing and voice change  Eyes: Negative for photophobia, pain, discharge, redness, itching and visual disturbance  Respiratory: Negative for cough, chest tightness, shortness of breath and wheezing  Cardiovascular: Negative for chest pain, palpitations and leg swelling  Gastrointestinal: Negative for abdominal distention, abdominal pain, blood in stool, diarrhea, nausea and vomiting  Endocrine: Negative for cold intolerance, heat intolerance, polydipsia, polyphagia and polyuria  Genitourinary: Negative for dysuria, flank pain, frequency and hematuria  Musculoskeletal: Negative for back pain, gait problem, joint swelling, myalgias, neck pain and neck stiffness  Skin: Negative for color change, pallor, rash and wound  Neurological: Negative for dizziness, tremors, seizures, syncope, speech difficulty, weakness, numbness and headaches  Psychiatric/Behavioral: Negative for agitation  All other systems reviewed and are negative  Physical Exam:  Body mass index is 35 38 kg/m²    /82 (BP Location: Left arm, Patient Position: Sitting, Cuff Size: Standard)   Pulse 62   Temp 98 °F (36 7 °C) (Temporal)   Ht 5' 10" (1 778 m)   Wt 112 kg (246 lb 9 6 oz)   SpO2 99%   BMI 35 38 kg/m²    Wt Readings from Last 3 Encounters:   10/25/22 112 kg (246 lb 9 6 oz)   09/12/22 111 kg (244 lb)   08/15/22 111 kg (244 lb)       GEN: NAD  E/n/m nl facies, hearing intact bilat, tongue midline, lips nl  Eyes: no stare or proptosis, nl lids and conjunctiva, EOMI  Neck: trachea midline, thyroid NT to palpation, nl in size, no nodules or neck masses noted, no cervical LAD  CV; heart reg rate s1s2 nl, no m/r/g appreciated, no SONIA  Resp: CTAB, good effort  Ab+BS  Neuro: no tremor, 2+ DTRs in BUE  MS: no c/c in digits, moves all 4 ext, nl muscle bulk, gait nl  Skin: warm and dry, no palmar erythema  Ext: no c/c in digits, no edema bilaterally, 2+ DP and PT pulses bilat, no breaks in skin/ulcers on feet, sensation intact to monofilament testing on plantar surfaces bilat  Psych: nl mood and affect, no gross lapses in memory  Patient's shoes and socks removed  Right Foot/Ankle   Right Foot Inspection  Skin Exam: skin normal  Skin not intact, no dry skin, no warmth, no callus, no erythema, no maceration, no abnormal color, no pre-ulcer, no ulcer and no callus  Toe Exam: No swelling, no tenderness, erythema and  no right toe deformity    Sensory   Vibration: intact  Proprioception: intact  Monofilament testing: intact    Vascular  Capillary refills: < 3 seconds  The right DP pulse is 2+  The right PT pulse is 2+  Left Foot/Ankle  Left Foot Inspection  Skin Exam: skin normal  Skin not intact, no dry skin, no warmth, no erythema, no maceration, normal color, no pre-ulcer, no ulcer and no callus  Toe Exam: No swelling, no tenderness, no erythema and no left toe deformity  Sensory   Vibration: intact  Proprioception: intact  Monofilament testing: intact    Vascular  Capillary refills: < 3 seconds  The left DP pulse is 2+  The left PT pulse is 2+       Assign Risk Category  No deformity present  No loss of protective sensation  No weak pulses  Risk: 0      Labs:   No components found for: HA1C  No components found for: GLU    Lab Results   Component Value Date    CREATININE 2 59 (H) 09/26/2022    CREATININE 2 37 (H) 09/20/2021    CREATININE 2 74 (H) 07/31/2021    BUN 33 (H) 09/26/2022     06/19/2018    K 3 9 09/26/2022     09/26/2022    CO2 29 09/26/2022     eGFR   Date Value Ref Range Status   09/26/2022 24 ml/min/1 73sq m Final     No components found for: [facility]    Lab Results   Component Value Date    CHOL 140 05/15/2018    HDL 34 (L) 10/24/2022    TRIG 126 10/24/2022       Lab Results   Component Value Date    ALT 43 07/31/2021    AST 48 (H) 07/31/2021    ALKPHOS 37 (L) 07/31/2021    BILITOT 0 7 04/11/2018       No results found for: TSH, FREET4, TSI    Impression:  1  Type 2 diabetes mellitus with stage 4 chronic kidney disease, with long-term current use of insulin [facility] )    2  Vitamin D deficiency    3  Persistent proteinuria    4  Primary hypertension    5  Pure hypercholesterolemia           Plan:    Demarcus Ennis was seen today for diabetes type 2  Diagnoses and all orders for this visit:    Type 2 diabetes mellitus with stage 4 chronic kidney disease, with long-term current use of insulin ([facility] ):  Uncontrolled with most recent A1C of 8 0%, the goal is less than 7 0%  His current regimen is Toujeo 52 units at bedtime, Starlix 120 mg before each meal and Ozempic 1 mg once weekly,  He reports being adherent to his regimen however not adherent to diabetic diet  I did recommended to increase ozempic to 2 mg once weekly however he prefers to work on his diet and life style modification before any medication changes  He is not a candidate for SGLT-2 inhibitors due to GFR below 30  I referred him to diabetes educator to get more updated information on MNT  He will continue to monitor his blood sugar at home and send me his log in few weeks for further changes if needed    Return to office in 3 months  Labs before next visit  -     Ambulatory referral to Diabetic Education; Future  -     HEMOGLOBIN A1C W/ EAG ESTIMATION Lab Collect; Future  -     Comprehensive metabolic panel Lab Collect; Future  -     Microalbumin / creatinine urine ratio Lab Collect; Future    Vitamin D deficiency:  Continue current dose VIT-D supplement  Check VIT-D before next visit  Persistent proteinuria:  He is not a candidate for SGLT-2 inhibitor due to low GFR <30, so very important to control blood pressure and glycemic control  Check microalbumine/creatine ration before next visit      Primary hypertension  Blood pressure is at goal with current regimen  Continue current regimen and check microalbumin/creatinine before next visit  Hyperlipidemia:  Currently on Lipitor 80 mg once daily, is tolerating well and taking regularly, his most recent LDL is 51,  He is on fenofibrate, renally adjusted  As he has triglyceride above 150 despite being on statin in a patient with diabetes fibrate is indicated  Lipid profile before next visit      Discussed with the patient and all questioned fully answered  He will call me if any problems arise      Counseled patient on diagnostic results, prognosis, risk and benefit of treatment options, instruction for management, importance of treatment compliance, Risk  factor reduction and impressions      Drew Smith MD

## 2022-10-26 PROBLEM — E11.29 TYPE 2 DIABETES MELLITUS WITH KIDNEY COMPLICATION, WITH LONG-TERM CURRENT USE OF INSULIN (HCC): Status: ACTIVE | Noted: 2019-11-14

## 2022-10-26 PROBLEM — Z79.4 TYPE 2 DIABETES MELLITUS WITH KIDNEY COMPLICATION, WITH LONG-TERM CURRENT USE OF INSULIN (HCC): Status: ACTIVE | Noted: 2019-11-14

## 2022-11-02 ENCOUNTER — OFFICE VISIT (OUTPATIENT)
Dept: DIABETES SERVICES | Facility: CLINIC | Age: 67
End: 2022-11-02

## 2022-11-02 VITALS — WEIGHT: 244.2 LBS | BODY MASS INDEX: 35.04 KG/M2

## 2022-11-02 DIAGNOSIS — E11.22 TYPE 2 DIABETES MELLITUS WITH STAGE 4 CHRONIC KIDNEY DISEASE, WITH LONG-TERM CURRENT USE OF INSULIN (HCC): Primary | ICD-10-CM

## 2022-11-02 DIAGNOSIS — N18.4 TYPE 2 DIABETES MELLITUS WITH STAGE 4 CHRONIC KIDNEY DISEASE, WITH LONG-TERM CURRENT USE OF INSULIN (HCC): Primary | ICD-10-CM

## 2022-11-02 DIAGNOSIS — Z79.4 TYPE 2 DIABETES MELLITUS WITH STAGE 4 CHRONIC KIDNEY DISEASE, WITH LONG-TERM CURRENT USE OF INSULIN (HCC): Primary | ICD-10-CM

## 2022-11-02 NOTE — PROGRESS NOTES
Medical Nutrition Therapy        Assessment    Visit Type: Initial visit  Chief complaint/Medical Diagnosis/reason for visit E11 22, N18 4, Z79 4 (ICD-10-CM) - Type 2 diabetes mellitus with stage 4 chronic kidney disease, with long-term current use of insulin ([facility] )    HPI Enedina West was seen in person for the initial MNT appointment, and accompanied by his wife Kenna Schneider  Patient reports checking BG levels 2x/ day with a FBG that ranges from 105-140/150  At bedtime BGs are often 130-140 mg/dL  Enedina West shared that he has stress due to financial difficulties, and that he obtains food from his local food bank  As for exercise, Enedina West walks for 10 minutes per day when checking his mail, and also plans to add in weight resistance by using dumbbells while sitting  Neuropathy prevents further activity  Problems identified in food recall include inconsistent carbohydrate intake and meal skipping  Consumption of carbohydrates ranges from 15 to 79 grams per meal   Provided patient with a 1765 calorie meal plan to assist with consistency, balance and portion control  Encouraged the consumption of regular meals at regular times  Advised patient to keep carbohydrate intake to 45 grams per meal and 15-20 per snack to assist with glycemic control  Suggested keeping protein intake to 8 ounces a day and fat to 5 servings daily to assist with lipid management and calorie control  Portion booklet and food labels were used to teach basic carbohydrate counting  Patient agreed to keep daily food logs and return them in 2 months for assessment  RD will remain available for further dietary questions/concerns       Ht Readings from Last 1 Encounters:   10/25/22 5' 10" (1 778 m)     Wt Readings from Last 3 Encounters:   10/25/22 112 kg (246 lb 9 6 oz)   09/12/22 111 kg (244 lb)   08/15/22 111 kg (244 lb)     Weight Change: No    Barriers to Learning: no barriers    Do you follow any special diet presently?: No  Who shops: patient and spouse  Who cooks: patient and spouse    Food Log: Completed via the method of food recall    Wakes up about 9 am    Gets food from food bank    Breakfast:9:30- 10 am; 1 c corn chex (26 4 g) with 1 c milk (lactaid 6 5 g) OR 2 5"-inch pancakes with 2 TBSP syrup (reg)  With 1 c lactaid milk OR 2 fried eggs with onions w/2 slices of wheat bread with 1 TBSP ketchup & milligan w/ 1 c milk  Morning Snack:none  Lunch:1:30 pm; skips;  left overs- 1 cup of soup (vegetables, with potato, sometimes chicken) w/ water sometimes with sandwich (pb or turkey)   Afternoon Snack:1:30 pm; if skips lunch then 20-30 large grapes or wouldn't have a snack  Dinner: 5:30-6 pm; vegetable 1 c sweet potato with fish or turkey or chicken with veg (green beans, beets, carrots) or 1 1/2 c (no sugar added) apple sauce   Evening Snack: 8-9 pm popcorn bag (multi-serving) OR fruit  Beverages: water, lactaid milk  Eating out/Take out:1x/week; diner- stewed tomato with 2 c baked mac n cheese & salad with small roll with water  Exercise yard work, 2x/week, walks 10 min round-trip to get mail each day    Calorie needs 1765 kcals/day Carbs: 45 g/meal, 15-20 g/snack     Fat: 5 servings/day    Protein:8 ounces/day    Nutrition Diagnosis:  Food and nutrition related knowledge deficit  related to Lack of prior exposure to accurate nutrition related information as evidenced by [employer] inaccurate or incomplete information    Intervention: plate method, label reading, carbohydrate counting, meal timing, individualized meal plan, exercise guidelines and food diary     Treatment Goals: Patient understands education and recommendations, Patient will monitor fat intake, Patient will consume 3 meals a day, Patient will count carbohydrates, Patient will exercise and Patient will monitor blood glucose    Monitoring and evaluation:    Term code indicator  FH 1 3 2 Food Intake Criteria: Eat 3 meals per day, 4-5 hours apart  No meal skipping     Term code indicator  FH 1 6 3 Carbohydrate Intake Criteria: Eat 45 grams of carbohydrate per meal and 15-20 g per snack  Term code indicator  CH 2 2 Treatments/Therapy/Alternative Medicine Criteria: Exercise as able and approved by your physician     Materials Provided: Portion book, 3-day food log    Patient’s Response to Instruction:  Mell Al  Expected Compliancegood    Begin Time: 10:48 am  End Time: 12:12 pm  Referring Provider: Hamilton Keith MD    Thank you for coming to the [address] [place] [address] for education today  Please feel free to call with any questions or concerns      Kody Zavala, MS, RDN, LDN  [address] [place] [id number] [phone]

## 2022-11-02 NOTE — PATIENT INSTRUCTIONS
Eat 3 meals per day, 4-5 hours apart  No meal skipping       Eat 45 grams of carbohydrate per meal and 15-20 grams per snack     Exercise as able and approved by your physician     Complete 3-day food log and return completed log at the follow up appointment

## 2022-11-15 ENCOUNTER — TELEPHONE (OUTPATIENT)
Dept: PULMONOLOGY | Facility: CLINIC | Age: 67
End: 2022-11-15

## 2022-11-15 DIAGNOSIS — Z79.4 TYPE 2 DIABETES MELLITUS WITH STAGE 4 CHRONIC KIDNEY DISEASE, WITH LONG-TERM CURRENT USE OF INSULIN (HCC): ICD-10-CM

## 2022-11-15 DIAGNOSIS — N18.4 TYPE 2 DIABETES MELLITUS WITH STAGE 4 CHRONIC KIDNEY DISEASE, WITH LONG-TERM CURRENT USE OF INSULIN (HCC): ICD-10-CM

## 2022-11-15 DIAGNOSIS — E11.22 TYPE 2 DIABETES MELLITUS WITH STAGE 4 CHRONIC KIDNEY DISEASE, WITH LONG-TERM CURRENT USE OF INSULIN (HCC): ICD-10-CM

## 2022-11-15 NOTE — TELEPHONE ENCOUNTER
Blood Sugars running low  This morning it was 93 and he states "I don't feel right "  Please advise

## 2022-11-15 NOTE — TELEPHONE ENCOUNTER
93 is not "low" but he may have some slight symptoms of hypoglycemia for which he can eat a little sugar  Please call for further information regarding how he is taking his medication and when his blood sugars are dropping  Thanks

## 2022-11-15 NOTE — TELEPHONE ENCOUNTER
Spoke to patient he stated that he understands its not a "low" critical but it feels that way to him and he doesn't like it  He took himself down to 46 units of Goldvel Jerry recommends reducing by two units so 44 nightly       LVM for patient to return our call, just need to know how he is feeling what his diet has been recently and what his sugars are now  Also how is he taking his medication

## 2022-11-16 RX ORDER — INSULIN GLARGINE 300 U/ML
44 INJECTION, SOLUTION SUBCUTANEOUS DAILY
Qty: 15 ML | Refills: 0
Start: 2022-11-16 | End: 2023-02-14

## 2022-12-08 ENCOUNTER — TELEPHONE (OUTPATIENT)
Dept: NEPHROLOGY | Facility: CLINIC | Age: 67
End: 2022-12-08

## 2022-12-16 LAB
CREAT ?TM UR-SCNC: 267 UMOL/L
EXT MICROALBUMIN URINE RANDOM: 92.2
MICROALBUMIN/CREAT UR: 345.3 MG/G{CREAT}

## 2022-12-19 ENCOUNTER — OFFICE VISIT (OUTPATIENT)
Dept: NEPHROLOGY | Facility: CLINIC | Age: 67
End: 2022-12-19

## 2022-12-19 VITALS
BODY MASS INDEX: 35.79 KG/M2 | HEIGHT: 70 IN | WEIGHT: 250 LBS | DIASTOLIC BLOOD PRESSURE: 62 MMHG | OXYGEN SATURATION: 98 % | SYSTOLIC BLOOD PRESSURE: 122 MMHG | HEART RATE: 65 BPM

## 2022-12-19 DIAGNOSIS — E55.9 VITAMIN D DEFICIENCY: ICD-10-CM

## 2022-12-19 DIAGNOSIS — Z90.5 ACQUIRED ABSENCE OF KIDNEY: Primary | ICD-10-CM

## 2022-12-19 DIAGNOSIS — N18.9 CHRONIC KIDNEY DISEASE-MINERAL AND BONE DISORDER: ICD-10-CM

## 2022-12-19 DIAGNOSIS — N18.4 CKD (CHRONIC KIDNEY DISEASE), STAGE IV (HCC): ICD-10-CM

## 2022-12-19 DIAGNOSIS — E83.9 CHRONIC KIDNEY DISEASE-MINERAL AND BONE DISORDER: ICD-10-CM

## 2022-12-19 DIAGNOSIS — E66.01 CLASS 2 SEVERE OBESITY DUE TO EXCESS CALORIES WITH SERIOUS COMORBIDITY AND BODY MASS INDEX (BMI) OF 35.0 TO 35.9 IN ADULT (HCC): ICD-10-CM

## 2022-12-19 DIAGNOSIS — N20.0 NEPHROLITHIASIS: ICD-10-CM

## 2022-12-19 DIAGNOSIS — M89.9 CHRONIC KIDNEY DISEASE-MINERAL AND BONE DISORDER: ICD-10-CM

## 2022-12-19 DIAGNOSIS — E78.00 PURE HYPERCHOLESTEROLEMIA: ICD-10-CM

## 2022-12-19 DIAGNOSIS — I12.9 HYPERTENSIVE CHRONIC KIDNEY DISEASE WITH STAGE 1 THROUGH STAGE 4 CHRONIC KIDNEY DISEASE, OR UNSPECIFIED CHRONIC KIDNEY DISEASE: ICD-10-CM

## 2022-12-19 NOTE — PROGRESS NOTES
[facility], [address] [facility]    Name: Mando Dove  YOB: 1955      Assessment/Plan:           Problem List Items Addressed This Visit        Genitourinary    CKD (chronic kidney disease), stage IV ([address] )     Kidney function is reduced with a baseline creatinine of 2 3 to 2 7 mg/dL  Will stop fenofibrate  He does not take NSAIDs         Hypertensive chronic kidney disease with stage 1 through stage 4 chronic kidney disease, or unspecified chronic kidney disease     Lab Results   Component Value Date    EGFR 24 09/26/2022    EGFR 27 09/20/2021    EGFR 23 07/31/2021    CREATININE 2 59 (H) 09/26/2022    CREATININE 2 37 (H) 09/20/2021    CREATININE 2 74 (H) 07/31/2021   Blood pressure is well controlled         Nephrolithiasis     No kidney stone attack         Chronic kidney disease-mineral and bone disorder     PTH stable            Other    Acquired absence of kidney - Primary     Has acquired absence of the left kidney due to renal cell cancer  He is followed by urology and has periodic CAT scans         Pure hypercholesterolemia     Would stop fenofibrate in view of his reduced kidney function         Class 2 severe obesity due to excess calories with serious comorbidity and body mass index (BMI) of 35 0 to 35 9 in [facility])     He has lost over 10 pounds with Ozempic 1 mg              Subjective:      Patient ID: Mando Dove is a 69 y o  male  referred by Dr Prerna Baca    HPI Has history of CKD 4, proteinuria, DM exacerbated by steroid injections, primary hypertension and acquired absence of left kidney in June 2019    The following portions of the patient's history were reviewed and updated as appropriate: allergies, current medications, past family history, past medical history, past social history, past surgical history and problem list     Review of Systems   Constitutional: Negative for fatigue  HENT: Positive for hearing loss   Negative for congestion  Needs hearing aids   Eyes: Negative for visual disturbance  Respiratory: Negative for cough and shortness of breath  Cardiovascular: Negative for chest pain, palpitations and leg swelling  Gastrointestinal: Positive for abdominal pain  Negative for blood in stool, constipation and diarrhea  Right sided pain unrelated to activity or meals   Genitourinary: Negative for difficulty urinating, dysuria, hematuria and urgency  Sees Urology yearly after his left nephrectomy for RCCA and has yearly CT  He drinks 64 ounces of water a day and has clear light urine   Musculoskeletal: Positive for back pain and joint swelling  Needs knee replacements, a herniated cervical disc and shoulder arthritis  Has foot pain due to neuropathy   Skin: Negative  Had a cut on ant tibial surface right leg with bruising   Neurological: Positive for headaches  He was seen in ED at [facility] weeks ago with headache due to sinus congestion and treated with fluticasone   Hematological: Does not bruise/bleed easily  Psychiatric/Behavioral: Negative for dysphoric mood           Social History     Socioeconomic History   • Marital status: /Civil Union     Spouse name: None   • Number of children: None   • Years of education: None   • Highest education level: None   Occupational History   • Occupation:     Tobacco Use   • Smoking status: Never   • Smokeless tobacco: Never   Vaping Use   • Vaping Use: Never used   Substance and Sexual Activity   • Alcohol use: No   • Drug use: No   • Sexual activity: None   Other Topics Concern   • None   Social History Narrative    Patient is a former smoker - As per [address]    Current drug user - As per [address]    Consumes on average 1 cup of regular coffee per day      Social Determinants of Health     Financial Resource Strain: Not on file   Food Insecurity: Not on file   Transportation Needs: Not on file   Physical Activity: Not on file Stress: Not on file   Social Connections: Not on file   Intimate Partner Violence: Not on file   Housing Stability: Not on file     Past Medical History:   Diagnosis Date   • Angina pectoris, unspecified ([facility] )    • Cancer of kidney ([facility] ) 2019   • Chronic kidney disease, stage 2 (mild)    • Chronic kidney disease, stage 3 (HCC)    • Diabetes mellitus ([facility] )    • Hypercholesterolemia    • Hypertension    • Nephrolithiasis     calcium oxalate - had lithotripsy    • Seasonal allergies    • Stage 3 chronic kidney disease ([facility] ) 11/14/2019     Past Surgical History:   Procedure Laterality Date   • ARTHROSCOPY KNEE Bilateral    • CARDIAC SURGERY      balloon procedure and stent placement    • FOOT SURGERY Right     implants   • LAMINECTOMY     • NEPHRECTOMY Left 06/2019   • PARTIAL HIP ARTHROPLASTY Right    • ROTATOR CUFF REPAIR     • SHOULDER ARTHROSCOPY Right    • SHOULDER SURGERY Left     removal of bone spur       Current Outpatient Medications:   •  albuterol (PROVENTIL HFA,VENTOLIN HFA) 90 mcg/act inhaler, Inhale 2 puffs every 4 (four) hours as needed, Disp: , Rfl:   •  amLODIPine (NORVASC) 10 mg tablet, , Disp: , Rfl:   •  aspirin 81 mg chewable tablet, Chew 81 mg, Disp: , Rfl:   •  atorvastatin (LIPITOR) 80 mg tablet, Take by mouth, Disp: , Rfl:   •  cholecalciferol (VITAMIN D3) 1,000 units tablet, Take 1 tablet (1,000 Units total) by mouth daily, Disp: 90 tablet, Rfl: 1  •  fenofibrate (TRICOR) 54 MG tablet, Take 54 mg by mouth daily, Disp: , Rfl:   •  fluticasone (FLONASE) 50 mcg/act nasal spray, 2 sprays into each nostril daily, Disp: 16 g, Rfl: 3  •  glucose blood (OneTouch Verio) test strip, Use to test blood sugar three times daily  , Disp: 200 each, Rfl: 3  •  hydrochlorothiazide (HYDRODIURIL) 25 mg tablet, , Disp: , Rfl:   •  insulin glargine (Toujeo SoloStar) 300 units/mL CONCENTRATED U-300 injection pen (1-unit dial), Inject 44 Units under the skin daily, Disp: 15 mL, Rfl: 0  •  isosorbide mononitrate (IMDUR) 60 mg 24 hr tablet, Take 60 mg by mouth Take 1/2 tablet by mouth bid, Disp: , Rfl:   •  MAGNESIUM PO, Take 500 mg by mouth, Disp: , Rfl:   •  metoprolol tartrate (LOPRESSOR) 50 mg tablet, Take 50 mg by mouth every 12 (twelve) hours, Disp: , Rfl:   •  Multiple Vitamin (MULTIVITAMIN ADULT PO), Take by mouth, Disp: , Rfl:   •  nateglinide (STARLIX) 120 mg tablet, Take 1 tablet (120 mg total) by mouth 3 (three) times a day before meals, Disp: 270 tablet, Rfl: 1  •  nitroglycerin (NITROSTAT) 0 4 mg SL tablet, , Disp: , Rfl:   •  OneTouch Delica Lancets 19U MISC, Use to test blood sugar three times daily  , Disp: 200 each, Rfl: 3  •  rivaroxaban (XARELTO) 2 5 mg tablet, Take 2 5 mg by mouth 2 (two) times a day , Disp: , Rfl:   •  semaglutide, 1 mg/dose, (Ozempic) 4 MG/3ML SOPN injection pen, Inject 0 75 mL (1 mg total) under the skin once a week, Disp: 9 mL, Rfl: 1  •  sertraline (ZOLOFT) 50 mg tablet, Take 1 tablet (50 mg total) by mouth daily, Disp: 90 tablet, Rfl: 3  •  traZODone (DESYREL) 100 mg tablet, , Disp: , Rfl:   •  ammonium lactate (LAC-HYDRIN) 12 % cream, ammonium lactate 12 % topical cream (Patient not taking: Reported on 10/25/2022), Disp: , Rfl:   •  diclofenac sodium (VOLTAREN) 1 %, apply 2 grams to affected area four times a day, Disp: , Rfl:   •  hydrocortisone 2 5 % cream, as needed  (Patient not taking: Reported on 10/25/2022), Disp: , Rfl:   •  methocarbamol (ROBAXIN) 500 mg tablet, Take 1 tablet (500 mg total) by mouth 4 (four) times a day (Patient not taking: No sig reported), Disp: 20 tablet, Rfl: 0  •  pregabalin (LYRICA) 75 mg capsule, Take 75 mg by mouth 3 (three) times a day (Patient not taking: No sig reported), Disp: , Rfl:     Lab Results   Component Value Date     06/19/2018    SODIUM 139 09/26/2022    K 3 9 09/26/2022     09/26/2022    CO2 29 09/26/2022    ANIONGAP 10 3 06/19/2018    AGAP 8 09/26/2022    BUN 33 (H) 09/26/2022    CREATININE 2 59 (H) 09/26/2022    GLUC 146 (H) 07/31/2021    GLUF 161 (H) 09/26/2022    CALCIUM 9 6 09/26/2022    AST 48 (H) 07/31/2021    ALT 43 07/31/2021    ALKPHOS 37 (L) 07/31/2021    PROT 6 6 04/11/2018    TP 8 0 07/31/2021    BILITOT 0 7 04/11/2018    TBILI 0 40 07/31/2021    EGFR 24 09/26/2022     Lab Results   Component Value Date    WBC 3 90 (L) 07/31/2021    HGB 12 8 (L) 07/31/2021    HCT 38 6 (L) 07/31/2021    MCV 79 (L) 07/31/2021     07/31/2021     Lab Results   Component Value Date    CHOLESTEROL 113 10/24/2022    CHOLESTEROL 134 03/16/2020    CHOLESTEROL 131 05/06/2019     Lab Results   Component Value Date    HDL 34 (L) 10/24/2022    HDL 35 (L) 03/16/2020    HDL 40 05/06/2019     Lab Results   Component Value Date    LDLCALC 54 10/24/2022    LDLCALC 72 03/16/2020    LDLCALC 73 05/06/2019     Lab Results   Component Value Date    TRIG 126 10/24/2022    TRIG 136 03/16/2020    TRIG 88 05/06/2019     No results found for: [place], [place]  Lab Results   Component Value Date    NBY4UZXHWQKE 2 740 05/06/2019     Lab Results   Component Value Date    CALCIUM 9 6 09/26/2022    PHOS 2 5 (L) 04/11/2018     No results found for: SPEP, UPEP  No results found for: MICROALBUR, [id number]    MAC  345 3 mg/g  PTH 42 2  LDL 52    Objective:      /62 (BP Location: Left arm, Patient Position: Sitting)   Pulse 65   Ht 5' 10" (1 778 m)   Wt 113 kg (250 lb)   SpO2 98%   BMI 35 87 kg/m²          Physical Exam  Vitals reviewed  Constitutional:       General: He is not in acute distress  Appearance: Normal appearance  He is not toxic-appearing  HENT:      Right Ear: External ear normal       Left Ear: External ear normal    Eyes:      Extraocular Movements: Extraocular movements intact  Conjunctiva/sclera: Conjunctivae normal       Pupils: Pupils are equal, round, and reactive to light  Neck:      Vascular: No carotid bruit  Cardiovascular:      Rate and Rhythm: Normal rate and regular rhythm     Pulmonary:      Effort: No respiratory distress  Breath sounds: Normal breath sounds  No wheezing or rales  Abdominal:      General: Bowel sounds are normal  There is no distension  Tenderness: There is no abdominal tenderness  Musculoskeletal:         General: Normal range of motion  Cervical back: Normal range of motion and neck supple  Right lower leg: No edema  Left lower leg: No edema  Lymphadenopathy:      Cervical: No cervical adenopathy  Skin:     General: Skin is warm and dry  Neurological:      General: No focal deficit present  Mental Status: He is alert and oriented to person, place, and time  Psychiatric:         Mood and Affect: Mood normal          Behavior: Behavior normal          Thought Content:  Thought content normal          Judgment: Judgment normal

## 2022-12-19 NOTE — ASSESSMENT & PLAN NOTE
Has acquired absence of the left kidney due to renal cell cancer    He is followed by urology and has periodic CAT scans

## 2022-12-19 NOTE — ASSESSMENT & PLAN NOTE
Kidney function is reduced with a baseline creatinine of 2 3 to 2 7 mg/dL    Will stop fenofibrate  He does not take NSAIDs

## 2022-12-19 NOTE — ASSESSMENT & PLAN NOTE
Lab Results   Component Value Date    EGFR 24 09/26/2022    EGFR 27 09/20/2021    EGFR 23 07/31/2021    CREATININE 2 59 (H) 09/26/2022    CREATININE 2 37 (H) 09/20/2021    CREATININE 2 74 (H) 07/31/2021   Blood pressure is well controlled

## 2022-12-29 DIAGNOSIS — Z79.4 TYPE 2 DIABETES MELLITUS WITH STAGE 4 CHRONIC KIDNEY DISEASE, WITH LONG-TERM CURRENT USE OF INSULIN (HCC): ICD-10-CM

## 2022-12-29 DIAGNOSIS — E11.22 TYPE 2 DIABETES MELLITUS WITH STAGE 4 CHRONIC KIDNEY DISEASE, WITH LONG-TERM CURRENT USE OF INSULIN (HCC): ICD-10-CM

## 2022-12-29 DIAGNOSIS — E66.01 SEVERE OBESITY (BMI >= 40) (HCC): ICD-10-CM

## 2022-12-29 DIAGNOSIS — N18.4 TYPE 2 DIABETES MELLITUS WITH STAGE 4 CHRONIC KIDNEY DISEASE, WITH LONG-TERM CURRENT USE OF INSULIN (HCC): ICD-10-CM

## 2022-12-29 RX ORDER — INSULIN GLARGINE 300 U/ML
44 INJECTION, SOLUTION SUBCUTANEOUS DAILY
Qty: 15 ML | Refills: 0 | Status: SHIPPED | OUTPATIENT
Start: 2022-12-29 | End: 2023-03-29

## 2023-01-04 ENCOUNTER — OFFICE VISIT (OUTPATIENT)
Dept: DIABETES SERVICES | Facility: CLINIC | Age: 68
End: 2023-01-04

## 2023-01-04 VITALS — WEIGHT: 241.8 LBS | BODY MASS INDEX: 34.69 KG/M2

## 2023-01-04 DIAGNOSIS — N18.4 TYPE 2 DIABETES MELLITUS WITH STAGE 4 CHRONIC KIDNEY DISEASE, WITH LONG-TERM CURRENT USE OF INSULIN (HCC): Primary | ICD-10-CM

## 2023-01-04 DIAGNOSIS — Z79.4 TYPE 2 DIABETES MELLITUS WITH STAGE 4 CHRONIC KIDNEY DISEASE, WITH LONG-TERM CURRENT USE OF INSULIN (HCC): Primary | ICD-10-CM

## 2023-01-04 DIAGNOSIS — E11.22 TYPE 2 DIABETES MELLITUS WITH STAGE 4 CHRONIC KIDNEY DISEASE, WITH LONG-TERM CURRENT USE OF INSULIN (HCC): Primary | ICD-10-CM

## 2023-01-04 NOTE — PATIENT INSTRUCTIONS
Eat 3 meals per day, 4-5 hours apart  No meal skipping  Eat 45 grams of carbohydrate per meal, and no more than 15 grams per snack  Complete 3-day food log and return completed log at the follow up appointment

## 2023-01-04 NOTE — PROGRESS NOTES
Medical Nutrition Therapy        Assessment    Visit Type: Follow-up visit  Chief complaint/Medical Diagnosis/reason for visit      E11 22, N18 4, Z79 4 (ICD-10-CM) - Type 2 diabetes mellitus with stage 4 chronic kidney disease, with long-term current use of insulin ([facility] )       LUIS Nowak was seen in person for the follow-up MNT appointment  BG checks are performed 2x per day  At fasting, levels are often in the 140s in the morning according to patient's log  However, during his vacation, patient reported eating whatever he wanted and stated it was reflected in the higher BG values  Exercise is reported as walking 1/4 mile to the mail box  Patient reports too much pain to perform further exercise  Weight loss of about 8 lbs has occurred over about 3 weeks  Patient reports he is afraid to eat carbs for fear of gaining the weight back  Completed food log was not returned as agreed upon, as patient reports losing the document  Maria Elena Nowak is not following the meal plan and stated that it is hard to track his food  When asked how he knows if he gets the appropriate amounts of food without counting his carbs, he reported not knowing  Maria Elena Nowak says he is doing "the best he can" to eat correctly  He reported that he does not always have a choice as to what he eats when he gets his food free from a food bank  Patient also reports going out to restaurants about 2x per week  Problems identified in food recall include inconsistent carbohydrate intake, simple sugars through regular juice, limited non-starchy vegetables, lack of fruit and limited whole grains  Consumption of carbohydrates ranges from 0 to 76 grams per meal  Patient agreed to keep daily food logs and return them in 4 months for assessment  RD will remain available for further dietary questions/concerns       Ht Readings from Last 1 Encounters:   12/19/22 5' 10" (1 778 m)     Wt Readings from Last 3 Encounters:   12/19/22 113 kg (250 lb)   11/02/22 111 kg (244 lb 3 2 oz)   10/25/22 112 kg (246 lb 9 6 oz)     Weight Change: Yes about 8 lbs intentional weight loss over 3 week durations    Barriers to Learning: no barriers    Do you follow any special diet presently?: No  Who shops: patient and spouse  Who cooks: patient and spouse    Food Log: Completed via the method of food recall    Wakes up 8-9 am    Gets food from food bank    Breakfast:9 am; 2 c Special K with 1 1/2 c milk (2%) OR 2 egg sandwich (white or wheat or rye) with raw onions with ketchup & milligan with water  Morning Snack:none  Lunch:1-1:30 pm; left overs, meatloaf or chicken or fish with veg (green beans) and sometimes 1 slice of bread OR pb & jelly sandwich OR a bologna sandwich  Afternoon Snack: none  Dinner:5-6 pm; 2 c soup (veg, peas, corn, beans, tomato) and sometimes sandwich or 10-12 saltine crackers  Evening Snack:8-9 pm; 2 granola OR 1 bag popcorn (multi-serving)  Beverages: water (1/2 gallon), milk, or 8-10 oz cranberry juice (regular)  Eating out/Take out: not usually, 2x/ week; roast beef wrap (12 inch)  Exercise 1/4 mile walk to get mail (difficult)    Calorie needs 1765 kcals/day Carbs: 45 g/meal, 15-20 g/snack     Fat: 5 servings/day    Protein:8 ounces/day    Nutrition Diagnosis:  Not ready for diet/lifestyle change  related to Unwilling or disinterested in learning/applying information as evidenced by Lack of efficacy to make change or to overcome barriers to change    Intervention: label reading, carbohydrate counting, meal timing and food diary     Treatment Goals: Patient understands education and recommendations, Patient will monitor food intake daily with tracker, Patient will consume 3 meals a day, Patient will count carbohydrates, Patient will exercise and Patient will monitor blood glucose    Monitoring and evaluation:    Term code indicator  FH 1 3 2 Food Intake Criteria: Eat 3 meals per day, 4-5 hours apart  No meal skipping     Term code indicator  FH 1 6 3 Carbohydrate Intake Criteria: Eat 45 grams of carbohydrate per meal, and no more than 15 grams per snack  Materials Provided: 3-day food log    Patient’s Response to Instruction:  Comprehensiongood  Motivationpoor  Expected Compliancepoor    Begin Time: 10:00 am  End Time: 10:45 am  Referring Provider: Devi Fernandez MD    Thank you for coming to the [address] [place] [address] for education today  Please feel free to call with any questions or concerns      Radha Medina RD  [address] [place] [id number] [phone]
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
## 2023-02-03 ENCOUNTER — OFFICE VISIT (OUTPATIENT)
Dept: ENDOCRINOLOGY | Facility: CLINIC | Age: 68
End: 2023-02-03

## 2023-02-03 VITALS
SYSTOLIC BLOOD PRESSURE: 122 MMHG | WEIGHT: 245 LBS | DIASTOLIC BLOOD PRESSURE: 62 MMHG | HEIGHT: 70 IN | OXYGEN SATURATION: 99 % | BODY MASS INDEX: 35.07 KG/M2 | HEART RATE: 70 BPM

## 2023-02-03 DIAGNOSIS — I10 PRIMARY HYPERTENSION: ICD-10-CM

## 2023-02-03 DIAGNOSIS — E66.01 CLASS 2 SEVERE OBESITY DUE TO EXCESS CALORIES WITH SERIOUS COMORBIDITY AND BODY MASS INDEX (BMI) OF 35.0 TO 35.9 IN ADULT (HCC): ICD-10-CM

## 2023-02-03 DIAGNOSIS — Z79.4 TYPE 2 DIABETES MELLITUS WITH STAGE 4 CHRONIC KIDNEY DISEASE, WITH LONG-TERM CURRENT USE OF INSULIN (HCC): Primary | ICD-10-CM

## 2023-02-03 DIAGNOSIS — N18.4 TYPE 2 DIABETES MELLITUS WITH STAGE 4 CHRONIC KIDNEY DISEASE, WITH LONG-TERM CURRENT USE OF INSULIN (HCC): Primary | ICD-10-CM

## 2023-02-03 DIAGNOSIS — E11.22 TYPE 2 DIABETES MELLITUS WITH STAGE 4 CHRONIC KIDNEY DISEASE, WITH LONG-TERM CURRENT USE OF INSULIN (HCC): Primary | ICD-10-CM

## 2023-02-03 DIAGNOSIS — E55.9 VITAMIN D DEFICIENCY: ICD-10-CM

## 2023-02-03 LAB — SL AMB POCT HEMOGLOBIN AIC: 8.1 (ref ?–6.5)

## 2023-02-03 RX ORDER — BACLOFEN 20 MG
TABLET ORAL
COMMUNITY
Start: 2023-01-10

## 2023-02-03 RX ORDER — PEN NEEDLE, DIABETIC 30 GX3/16"
NEEDLE, DISPOSABLE MISCELLANEOUS
Qty: 100 EACH | Refills: 2 | Status: SHIPPED | OUTPATIENT
Start: 2023-02-03

## 2023-02-03 RX ORDER — ISOSORBIDE MONONITRATE 30 MG/1
30 TABLET, EXTENDED RELEASE ORAL 2 TIMES DAILY
COMMUNITY
Start: 2022-12-26

## 2023-02-03 NOTE — ASSESSMENT & PLAN NOTE
Patient remains above goal  Increase Ozempic to 2 mg once weekly  Patient is now willing to try a CGM  The patient is a candidate for CGM use: Indications: Diabetes Type 2  The patient is treated with basal insulin daily  SMBG data is being used to make adjustments to the insulin regimen  Patient will benefit from early notification of dropping blood sugar as this will occur rapidly, especially when he is more active with household chores  Patient knows to notify me with persistent hyperglycemia or episodes of hypoglycemia  Continue to focus on a healthy diet  Engage in physical activity as tolerated  F/U in 4 months       Lab Results   Component Value Date    HGBA1C 8 1 (A) 02/03/2023

## 2023-02-09 DIAGNOSIS — Z79.4 TYPE 2 DIABETES MELLITUS WITH STAGE 4 CHRONIC KIDNEY DISEASE, WITH LONG-TERM CURRENT USE OF INSULIN (HCC): ICD-10-CM

## 2023-02-09 DIAGNOSIS — N18.4 TYPE 2 DIABETES MELLITUS WITH STAGE 4 CHRONIC KIDNEY DISEASE, WITH LONG-TERM CURRENT USE OF INSULIN (HCC): ICD-10-CM

## 2023-02-09 DIAGNOSIS — E11.22 TYPE 2 DIABETES MELLITUS WITH STAGE 4 CHRONIC KIDNEY DISEASE, WITH LONG-TERM CURRENT USE OF INSULIN (HCC): ICD-10-CM

## 2023-02-09 RX ORDER — NATEGLINIDE 120 MG/1
TABLET ORAL
Qty: 270 TABLET | Refills: 1 | Status: SHIPPED | OUTPATIENT
Start: 2023-02-09

## 2023-03-01 DIAGNOSIS — Z79.4 TYPE 2 DIABETES MELLITUS WITH STAGE 4 CHRONIC KIDNEY DISEASE, WITH LONG-TERM CURRENT USE OF INSULIN (HCC): ICD-10-CM

## 2023-03-01 DIAGNOSIS — E11.22 TYPE 2 DIABETES MELLITUS WITH STAGE 4 CHRONIC KIDNEY DISEASE, WITH LONG-TERM CURRENT USE OF INSULIN (HCC): ICD-10-CM

## 2023-03-01 DIAGNOSIS — N18.4 TYPE 2 DIABETES MELLITUS WITH STAGE 4 CHRONIC KIDNEY DISEASE, WITH LONG-TERM CURRENT USE OF INSULIN (HCC): ICD-10-CM

## 2023-03-01 RX ORDER — INSULIN GLARGINE 300 U/ML
INJECTION, SOLUTION SUBCUTANEOUS
Qty: 15 ML | Refills: 0 | Status: SHIPPED | OUTPATIENT
Start: 2023-03-01

## 2023-03-21 DIAGNOSIS — F32.A DEPRESSION, UNSPECIFIED DEPRESSION TYPE: ICD-10-CM

## 2023-03-23 DIAGNOSIS — F32.A DEPRESSION, UNSPECIFIED DEPRESSION TYPE: ICD-10-CM

## 2023-04-03 DIAGNOSIS — N18.4 TYPE 2 DIABETES MELLITUS WITH STAGE 4 CHRONIC KIDNEY DISEASE, WITH LONG-TERM CURRENT USE OF INSULIN (HCC): ICD-10-CM

## 2023-04-03 DIAGNOSIS — E11.22 TYPE 2 DIABETES MELLITUS WITH STAGE 4 CHRONIC KIDNEY DISEASE, WITH LONG-TERM CURRENT USE OF INSULIN (HCC): ICD-10-CM

## 2023-04-03 DIAGNOSIS — Z79.4 TYPE 2 DIABETES MELLITUS WITH STAGE 4 CHRONIC KIDNEY DISEASE, WITH LONG-TERM CURRENT USE OF INSULIN (HCC): ICD-10-CM

## 2023-04-03 RX ORDER — INSULIN GLARGINE 300 U/ML
INJECTION, SOLUTION SUBCUTANEOUS
Qty: 10 ML | Refills: 0 | Status: SHIPPED | OUTPATIENT
Start: 2023-04-03

## 2023-04-04 LAB
CREAT ?TM UR-SCNC: 117.6 UMOL/L
EXT MICROALBUMIN URINE RANDOM: 122
MICROALBUMIN/CREAT UR: 1037.4 MG/G{CREAT}

## 2023-04-10 DIAGNOSIS — N18.4 CKD (CHRONIC KIDNEY DISEASE), STAGE IV (HCC): Primary | ICD-10-CM

## 2023-05-01 ENCOUNTER — TELEPHONE (OUTPATIENT)
Dept: PULMONOLOGY | Facility: CLINIC | Age: 68
End: 2023-05-01

## 2023-05-01 NOTE — TELEPHONE ENCOUNTER
Patient calling stating his sugars are running low since doubling up on his Ozempic  He will bring his readings in sometime today

## 2023-05-03 ENCOUNTER — TELEPHONE (OUTPATIENT)
Dept: ENDOCRINOLOGY | Facility: CLINIC | Age: 68
End: 2023-05-03

## 2023-05-03 NOTE — TELEPHONE ENCOUNTER
Glucose log reviewed  Patient's blood sugars are well controlled  Continue 2 mg of Ozempic  Will discontinue Toujeo  No need to take prior to colonoscopy

## 2023-05-03 NOTE — TELEPHONE ENCOUNTER
Patient came into office and dropped off his sugar level logs  I scanned his level log in and on 4-24 is when he began taking 2 units of ozempic  Patient hasnt been taking his insulin since his levels have been so low  Patient has a colonoscopy on 5/16 and he wants to know if he should still take his insulin since he wont be eating with the colonoscopy  He also wants to know if they want to make adjustments to his ozempic or medication after looking at his sugar logs

## 2023-05-10 ENCOUNTER — OFFICE VISIT (OUTPATIENT)
Dept: URGENT CARE | Facility: CLINIC | Age: 68
End: 2023-05-10

## 2023-05-10 VITALS
DIASTOLIC BLOOD PRESSURE: 65 MMHG | WEIGHT: 238 LBS | OXYGEN SATURATION: 97 % | BODY MASS INDEX: 34.15 KG/M2 | HEART RATE: 72 BPM | RESPIRATION RATE: 18 BRPM | TEMPERATURE: 97.3 F | SYSTOLIC BLOOD PRESSURE: 126 MMHG

## 2023-05-10 DIAGNOSIS — H61.23 BILATERAL IMPACTED CERUMEN: Primary | ICD-10-CM

## 2023-05-10 RX ORDER — AMOXICILLIN AND CLAVULANATE POTASSIUM 875; 125 MG/1; MG/1
TABLET, FILM COATED ORAL
COMMUNITY
Start: 2023-03-16

## 2023-05-10 RX ORDER — BISACODYL 5 MG/1
TABLET, SUGAR COATED ORAL
COMMUNITY
Start: 2023-04-25

## 2023-05-10 NOTE — PATIENT INSTRUCTIONS
Earwax Blockage   AMBULATORY CARE:   Earwax  can build up in your ear canal and cause a blockage  Earwax blockage happens when your ear makes earwax faster than your body can remove it  Common symptoms include the following:   Trouble hearing    Earache    Ear fullness or a feeling that something is plugging up your ear    Itching or ringing in your ear    Dizziness    Seed immediate care if:   You feel dizzy  You have discharge or blood coming out of your ear  Your ear pain does not go away or gets worse  Call your doctor if:   You have a fever  You have trouble hearing or hear ringing noises  You have questions or concerns about your condition or care  Treatment for earwax blockage:   Medicines  placed in the ear canal can soften the earwax so it will come out  Flushing your ear canal  with warm water may flush out the earwax  Small medical tools  may be used to remove the earwax  How to prevent earwax blockage:  Do not stick anything into your ears to clean them  Use cotton swabs on the outside of your ear only  Ask your healthcare provider for more information on ways to prevent blockage  Follow up with your doctor as directed:  Write down your questions so you remember to ask them during your visits  © Copyright [employer] 2022 Information is for End User's use only and may not be sold, redistributed or otherwise used for commercial purposes  The above information is an  only  It is not intended as medical advice for individual conditions or treatments  Talk to your doctor, nurse or pharmacist before following any medical regimen to see if it is safe and effective for you

## 2023-05-10 NOTE — PROGRESS NOTES
"  [facility]        NAME: Anum Winn is a 77 y o  male  : 1955    MRN: [id number]  DATE: May 10, 2023  TIME: 6:39 PM      Assessment and Plan     Bilateral impacted cerumen [H61 23]  1  Bilateral impacted cerumen              Patient Instructions   Ear cerumen removal    Date/Time: 5/10/2023 5:40 PM  Performed by: TORREY Yarbrough  Authorized by: TORREY Yarbrough   Universal Protocol:  Consent: Verbal consent obtained  Risks and benefits: risks, benefits and alternatives were discussed  Consent given by: patient  Time out: Immediately prior to procedure a \"time out\" was called to verify the correct patient, procedure, equipment, support staff and site/side marked as required  Timeout called at: 5/10/2023 5:40 PM   Patient understanding: patient states understanding of the procedure being performed  Required items: required blood products, implants, devices, and special equipment available  Patient identity confirmed: verbally with patient      Patient location:  Clinic  Procedure details:     Local anesthetic:  None    Location:  L ear and R ear    Procedure type: irrigation with instrumentation      Instrumentation: curette    Post-procedure details:     Complication:  None    Hearing quality:  Improved    Patient tolerance of procedure: Tolerated well, no immediate complications  Comments:      Patient reports right ear hearing and sensation back to baseline/normal but left ear still has some pressure and decreased hearing from baseline  All excess cerumen successfully removed b/l  Patient Instructions     Earwax Blockage   AMBULATORY CARE:   Earwax  can build up in your ear canal and cause a blockage  Earwax blockage happens when your ear makes earwax faster than your body can remove it          Common symptoms include the following:   • Trouble hearing    • Earache    • Ear fullness or a feeling that something is plugging up your ear    • Itching or ringing in your " ear    • Dizziness    Seed immediate care if:   • You feel dizzy  • You have discharge or blood coming out of your ear  • Your ear pain does not go away or gets worse  Call your doctor if:   • You have a fever  • You have trouble hearing or hear ringing noises  • You have questions or concerns about your condition or care  Treatment for earwax blockage:   • Medicines  placed in the ear canal can soften the earwax so it will come out  • Flushing your ear canal  with warm water may flush out the earwax  • Small medical tools  may be used to remove the earwax  How to prevent earwax blockage:  Do not stick anything into your ears to clean them  Use cotton swabs on the outside of your ear only  Ask your healthcare provider for more information on ways to prevent blockage  Follow up with your doctor as directed:  Write down your questions so you remember to ask them during your visits  © Copyright [employer] 2022 Information is for End User's use only and may not be sold, redistributed or otherwise used for commercial purposes  The above information is an  only  It is not intended as medical advice for individual conditions or treatments  Talk to your doctor, nurse or pharmacist before following any medical regimen to see if it is safe and effective for you  Follow up with PCP in 3-5 days  Proceed to  ER if symptoms worsen  Chief Complaint     Chief Complaint   Patient presents with   • Ear Fullness     bilateral         History of Present Illness     Patient presents reporting b/l ear discomfort, fullness, decreased hearing  Notes that he needs excess ear wax removed at least 2x/year  States this is due to hearing aides, hairy ears and picking at the dry skin at the openings to his ears  Review of Systems     Review of Systems   HENT: Positive for ear pain and hearing loss  Negative for ear discharge      All other systems reviewed and are negative  Current Medications       Current Outpatient Medications:   •  albuterol (PROVENTIL HFA,VENTOLIN HFA) 90 mcg/act inhaler, Inhale 2 puffs every 4 (four) hours as needed, Disp: , Rfl:   •  amLODIPine (NORVASC) 10 mg tablet, , Disp: , Rfl:   •  ammonium lactate (LAC-HYDRIN) 12 % cream, , Disp: , Rfl:   •  aspirin 81 mg chewable tablet, Chew 81 mg, Disp: , Rfl:   •  atorvastatin (LIPITOR) 80 mg tablet, Take by mouth, Disp: , Rfl:   •  cholecalciferol (VITAMIN D3) 1,000 units tablet, Take 1 tablet (1,000 Units total) by mouth daily, Disp: 90 tablet, Rfl: 1  •  EQ Gentle Laxative 5 MG EC tablet, TAKE 2 TABLETS BY MOUTH AT 3 AT NIGHT THE DAY PRIOR TO PROCEDURE AND 2 TABLETS AT 7 AT NIGHT, Disp: , Rfl:   •  fluticasone (FLONASE) 50 mcg/act nasal spray, 2 sprays into each nostril daily, Disp: 16 g, Rfl: 3  •  glucose blood (OneTouch Verio) test strip, Use to test blood sugar three times daily  , Disp: 200 each, Rfl: 3  •  hydrochlorothiazide (HYDRODIURIL) 25 mg tablet, , Disp: , Rfl:   •  hydrocortisone 2 5 % cream, as needed, Disp: , Rfl:   •  Insulin Pen Needle (Pen Needles) 32G X 4 MM MISC, Use to inject insulin once daily  , Disp: 100 each, Rfl: 2  •  isosorbide mononitrate (IMDUR) 30 mg 24 hr tablet, Take 30 mg by mouth 2 (two) times a day, Disp: , Rfl:   •  Magnesium Oxide 500 MG TABS, Take as directed, Disp: , Rfl:   •  metoprolol tartrate (LOPRESSOR) 50 mg tablet, Take 50 mg by mouth every 12 (twelve) hours, Disp: , Rfl:   •  Multiple Vitamin (MULTIVITAMIN ADULT PO), Take by mouth, Disp: , Rfl:   •  nateglinide (STARLIX) 120 mg tablet, take 1 tablet by mouth three times a day before meals, Disp: 270 tablet, Rfl: 1  •  nitroglycerin (NITROSTAT) 0 4 mg SL tablet, , Disp: , Rfl:   •  OneTouch Delica Lancets 52I MISC, Use to test blood sugar three times daily  , Disp: 200 each, Rfl: 3  •  rivaroxaban (XARELTO) 2 5 mg tablet, Take 2 5 mg by mouth 2 (two) times a day , Disp: , Rfl:   •  semaglutide, 2 mg/dose, (Ozempic, 2 MG/DOSE,) 8 mg/ mL injection pen, Inject 0 75 mL (2 mg total) under the skin every 7 days, Disp: 9 mL, Rfl: 1  •  sertraline (ZOLOFT) 50 mg tablet, Take 1 tablet (50 mg total) by mouth daily, Disp: 90 tablet, Rfl: 0  •  traZODone (DESYREL) 100 mg tablet, , Disp: , Rfl:   •  amoxicillin-clavulanate (AUGMENTIN) 875-125 mg per tablet, TAKE 1 TABLET BY MOUTH TWICE DAILY UNTIL GONE (Patient not taking: Reported on 5/10/2023), Disp: , Rfl:   •  Empagliflozin (JARDIANCE) 10 MG TABS tablet, Jardiance 10 mg tablet (Patient not taking: Reported on 5/10/2023), Disp: , Rfl:     Current Allergies     Allergies as of 05/10/2023 - Reviewed 05/10/2023   Allergen Reaction Noted   • Percocet [oxycodone-acetaminophen]  11/14/2019   • Tetracycline  02/02/2017              The following portions of the patient's history were reviewed and updated as appropriate: allergies, current medications, past family history, past medical history, past social history, past surgical history and problem list      Past Medical History:   Diagnosis Date   • Angina pectoris, unspecified ([address] )    • Cancer of kidney ([address] ) 2019   • Chronic kidney disease, stage 2 (mild)    • Chronic kidney disease, stage 3 ([address] )    • Diabetes mellitus ([address] )    • Hypercholesterolemia    • Hypertension    • Nephrolithiasis     calcium oxalate - had lithotripsy    • Seasonal allergies    • Stage 3 chronic kidney disease ([address] ) 11/14/2019       Past Surgical History:   Procedure Laterality Date   • ARTHROSCOPY KNEE Bilateral    • CARDIAC SURGERY      balloon procedure and stent placement    • FOOT SURGERY Right     implants   • LAMINECTOMY     • NEPHRECTOMY Left 06/2019   • PARTIAL HIP ARTHROPLASTY Right    • ROTATOR CUFF REPAIR     • SHOULDER ARTHROSCOPY Right    • SHOULDER SURGERY Left     removal of bone spur       Family History   Problem Relation Age of Onset   • Diabetes Mother    • Heart attack Father    • Heart disease Father          Medications have been verified  Objective     /65   Pulse 72   Temp (!) 97 3 °F (36 3 °C)   Resp 18   Wt 108 kg (238 lb)   SpO2 97%   BMI 34 15 kg/m²   No LMP for male patient  Physical Exam     Physical Exam  Vitals and nursing note reviewed  Constitutional:       General: He is not in acute distress  Appearance: Normal appearance  He is well-developed  He is not ill-appearing, toxic-appearing or diaphoretic  HENT:      Head: Normocephalic and atraumatic  Right Ear: Ear canal and external ear normal  Decreased hearing noted  There is impacted cerumen  Left Ear: Ear canal and external ear normal  Decreased hearing noted  There is impacted cerumen  Ears:      Comments: After removal, both TM wnl  R ear hearing returned to baseline; left ear hearing still slightly decreased per patient  L ear was flushed second (mix of hydrogen peroxide and warm water)--since all excess cerumen removed, hopefully that full feeling resolves as the moisture dries (did use a cotton ball at opening to canal to soak up some excess)  Eyes:      Pupils: Pupils are equal, round, and reactive to light  Pulmonary:      Effort: Pulmonary effort is normal  No respiratory distress  Abdominal:      General: There is no distension  Palpations: Abdomen is soft  Musculoskeletal:         General: Normal range of motion  Cervical back: Normal range of motion and neck supple  Skin:     General: Skin is warm and dry  Capillary Refill: Capillary refill takes less than 2 seconds  Neurological:      General: No focal deficit present  Mental Status: He is alert and oriented to person, place, and time  Psychiatric:         Mood and Affect: Mood normal          Behavior: Behavior normal          Thought Content:  Thought content normal          Judgment: Judgment normal

## 2023-05-26 LAB
CREAT ?TM UR-SCNC: 120.7 UMOL/L
EXT ALBUMIN URINE RANDOM: 118.1
HBA1C MFR BLD HPLC: 6.9 %
MICROALBUMIN/CREAT UR: 978.5 MG/G{CREAT}

## 2023-05-30 ENCOUNTER — TELEPHONE (OUTPATIENT)
Dept: NEPHROLOGY | Facility: CLINIC | Age: 68
End: 2023-05-30

## 2023-05-30 NOTE — TELEPHONE ENCOUNTER
Pt called, stated that we told him he needed to be on a higher dose of the magnesium, 5000 mg  Please advise

## 2023-05-31 NOTE — PROGRESS NOTES
Established Patient Progress Note      Chief Complaint   Patient presents with   • Diabetes Type 2        Impression & Plan:    Problem List Items Addressed This Visit        Endocrine    Type 2 diabetes mellitus with kidney complication, with long-term current use of insulin ([address] ) - Primary     Patient is well controlled  Continue Ozempic and Starlix  Continue with healthy diet and physical activity as tolerated  Patient knows to notify me with persistent hyperglycemia or episodes of hypoglycemia  F/u in 6 months  Lab Results   Component Value Date    HGBA1C 6 9 (H) 05/26/2023            Relevant Orders    Basic metabolic panel    Hemoglobin A1C       Genitourinary    Hypertensive chronic kidney disease with stage 1 through stage 4 chronic kidney disease, or unspecified chronic kidney disease     Lab Results   Component Value Date    CREATININE 2 59 (H) 09/26/2022    CREATININE 2 37 (H) 09/20/2021    CREATININE 2 74 (H) 07/31/2021    EGFR 24 09/26/2022    EGFR 27 09/20/2021    EGFR 23 07/31/2021   BP stable at 126/68  Continue current regimen  Other    Persistent proteinuria     Unable to tolerate SGLT2 due to adverse reaction of a rash  Can consider the addition of  kerendia  Will defer to nephrology management  Orders Placed This Encounter   Procedures   • Basic metabolic panel     This is a patient instruction: Patient fasting for 8 hours or longer recommended  Standing Status:   Future     Standing Expiration Date:   6/6/2024   • Hemoglobin A1C     Standing Status:   Future     Standing Expiration Date:   6/6/2024       History of Present Illness:   Jose Whiting is a 77 y o  male with hypertension, hyperlipidemia, and type 2 diabetes presenting to the office today for follow-up  Complications of diabetes include neuropathy and nephropathy  At patient's last appointment, Clarene Ally was increased to 2 mg once weekly    Patient was also willing to try a continuous glucose monitor, which I did order for him  Patient delivered glucose logs on 5/3/2023  At that time, he was well controlled and Toujeo was discontinued  Today, patient reports he also discontinued his Jardiance as he experienced a rash with this  Component      Latest Ref Rng & Units 2/3/2023 5/26/2023 5/26/2023           9:27 AM 12:00 AM  9:03 AM   Hemoglobin A1C      <5 7 % 8 1 (A) 6 9 6 9 (H)   eAG, EST AVG Glucose      mg/dL   151     eGFRcr >59 30 Low        Ref Range & Units 5/26/23  9:03 AM   Creatinine, Urine 50 0 - 200 0 mg/dL 120 7    Albumin, Urine <3 0 mg/dL 118 1 High     Albumin/Creatinine Ratio, Urine <30 0 mg/gm CREA 978 5 High          He reports feeling well  He continues to focus on a healthy diet and exercise to help maintain/lose weight  He has an upcoming procedure scheduled at the end of the month for a spinal cord stimulator trial     Home blood glucose readings: 100-170     Current regimen:     Starlix 120 mg 3 times daily before meals  Ozempic 2 mg once weekly      Last Eye Exam: UTD  Last Foot Exam: UTD    For hypertension, he is taking metoprolol tartrate 50 mg twice daily, 30 mg of isosorbide mononitrate twice daily, 25 mg of hydrochlorothiazide daily, and 10 mg of amlodipine daily  He denies headache and orthostatic hypotension  For hyperlipidemia, he is taking 80 mg of atorvastatin nightly  He denies myalgias    Cholesterol, LDL, Calculated <130 mg/dL 63        Patient Active Problem List   Diagnosis   • Acquired absence of kidney   • Pure hypercholesterolemia   • Type 2 diabetes mellitus with kidney complication, with long-term current use of insulin ([facility])   • Hypertension   • SOB (shortness of breath)   • Hearing loss due to cerumen impaction, bilateral   • CKD (chronic kidney disease), stage IV ([facility])   • Hypertensive chronic kidney disease with stage 1 through stage 4 chronic kidney disease, or unspecified chronic kidney disease   • Coronary artery disease involving native coronary artery without angina pectoris   • Degeneration of lumbosacral intervertebral disc   • Lumbar radiculopathy   • Nephrolithiasis   • Diabetes mellitus ([facility] )   • Chronic kidney disease-mineral and bone disorder   • Class 2 severe obesity due to excess calories with serious comorbidity and body mass index (BMI) of 35 0 to 35 9 in adult [facility])   • Vitamin D deficiency   • Elevated uric acid in blood   • Chronic knee pain   • Neck pain   • Persistent proteinuria      Past Medical History:   Diagnosis Date   • Angina pectoris, unspecified ([facility] )    • Cancer of kidney ([facility] ) 2019   • Chronic kidney disease, stage 2 (mild)    • Chronic kidney disease, stage 3 ([facility])    • Diabetes mellitus ([facility] )    • Hypercholesterolemia    • Hypertension    • Nephrolithiasis     calcium oxalate - had lithotripsy    • Seasonal allergies    • Stage 3 chronic kidney disease ([facility] ) 11/14/2019      Past Surgical History:   Procedure Laterality Date   • ARTHROSCOPY KNEE Bilateral    • CARDIAC SURGERY      balloon procedure and stent placement    • FOOT SURGERY Right     implants   • LAMINECTOMY     • NEPHRECTOMY Left 06/2019   • PARTIAL HIP ARTHROPLASTY Right    • ROTATOR CUFF REPAIR     • SHOULDER ARTHROSCOPY Right    • SHOULDER SURGERY Left     removal of bone spur      Family History   Problem Relation Age of Onset   • Diabetes Mother    • Diabetes type II Mother    • Heart attack Father    • Heart disease Father      Social History     Tobacco Use   • Smoking status: Never   • Smokeless tobacco: Never   Substance Use Topics   • Alcohol use: No     Allergies   Allergen Reactions   • Tetracyclines & Related Rash   • Percocet [Oxycodone-Acetaminophen]    • Tetracycline    • Jardiance [Empagliflozin] Rash         Current Outpatient Medications:   •  albuterol (PROVENTIL HFA,VENTOLIN HFA) 90 mcg/act inhaler, Inhale 2 puffs every 4 (four) hours as needed, Disp: , Rfl:   •  amLODIPine (NORVASC) 10 mg tablet, , Disp: , Rfl:   •  ammonium lactate (LAC-HYDRIN) 12 % cream, , Disp: , Rfl:   •  aspirin 81 mg chewable tablet, Chew 81 mg, Disp: , Rfl:   •  atorvastatin (LIPITOR) 80 mg tablet, Take by mouth, Disp: , Rfl:   •  cholecalciferol (VITAMIN D3) 1,000 units tablet, Take 1 tablet (1,000 Units total) by mouth daily, Disp: 90 tablet, Rfl: 1  •  EQ Gentle Laxative 5 MG EC tablet, TAKE 2 TABLETS BY MOUTH AT 3 AT NIGHT THE DAY PRIOR TO PROCEDURE AND 2 TABLETS AT 7 AT NIGHT, Disp: , Rfl:   •  fluticasone (FLONASE) 50 mcg/act nasal spray, 2 sprays into each nostril daily, Disp: 16 g, Rfl: 3  •  GaviLyte-G 236 g solution, Use as directed, Disp: , Rfl:   •  glucose blood (OneTouch Verio) test strip, Use to test blood sugar three times daily  , Disp: 200 each, Rfl: 3  •  hydrochlorothiazide (HYDRODIURIL) 25 mg tablet, , Disp: , Rfl:   •  hydrocortisone 0 5 % cream, APPLY CREAM TOPICALLY TO AFFECTED AREA ONCE DAILY, Disp: , Rfl:   •  hydrocortisone 2 5 % cream, as needed, Disp: , Rfl:   •  Insulin Pen Needle (Pen Needles) 32G X 4 MM MISC, Use to inject insulin once daily  , Disp: 100 each, Rfl: 2  •  isosorbide mononitrate (IMDUR) 30 mg 24 hr tablet, Take 30 mg by mouth 2 (two) times a day, Disp: , Rfl:   •  Magnesium Oxide 500 MG TABS, Take as directed, Disp: , Rfl:   •  metoprolol tartrate (LOPRESSOR) 50 mg tablet, Take 50 mg by mouth every 12 (twelve) hours, Disp: , Rfl:   •  Multiple Vitamin (MULTIVITAMIN ADULT PO), Take by mouth, Disp: , Rfl:   •  nateglinide (STARLIX) 120 mg tablet, take 1 tablet by mouth three times a day before meals, Disp: 270 tablet, Rfl: 1  •  nitroglycerin (NITROSTAT) 0 4 mg SL tablet, , Disp: , Rfl:   •  ondansetron (ZOFRAN) 4 mg tablet, TAKE 1 TABLET BY MOUTH ONE HOUR PRIOR TO EACH DOSE OF PREP, Disp: , Rfl:   •  OneTouch Delica Lancets 88V MISC, Use to test blood sugar three times daily  , Disp: 200 each, Rfl: 3  •  polyethylene glycol (GaviLyte-G) 4000 mL solution, GaviLyte-G 236 gram-22 74 gram-6 74 gram-5 86 gram oral solution  USE AS "DIRECTED, Disp: , Rfl:   •  rivaroxaban (XARELTO) 2 5 mg tablet, Take 2 5 mg by mouth 2 (two) times a day , Disp: , Rfl:   •  semaglutide, 2 mg/dose, (Ozempic, 2 MG/DOSE,) 8 mg/ mL injection pen, Inject 0 75 mL (2 mg total) under the skin every 7 days, Disp: 9 mL, Rfl: 1  •  sertraline (ZOLOFT) 50 mg tablet, Take 1 tablet (50 mg total) by mouth daily, Disp: 90 tablet, Rfl: 0  •  traZODone (DESYREL) 100 mg tablet, , Disp: , Rfl:     Review of Systems   Constitutional: Negative for activity change, appetite change, fatigue and unexpected weight change  HENT: Negative for dental problem, sore throat, trouble swallowing and voice change  Eyes: Negative for visual disturbance  Respiratory: Negative for cough, chest tightness and shortness of breath  Cardiovascular: Negative for chest pain, palpitations and leg swelling  Gastrointestinal: Negative for constipation, diarrhea, nausea and vomiting  Endocrine: Negative for polydipsia, polyphagia and polyuria  Genitourinary: Negative for frequency  Musculoskeletal: Positive for arthralgias  Negative for back pain, gait problem and myalgias  Skin: Negative for wound  Allergic/Immunologic: Positive for environmental allergies  Negative for food allergies  Neurological: Positive for numbness  Negative for dizziness, weakness, light-headedness and headaches  Psychiatric/Behavioral: Negative for decreased concentration, dysphoric mood and sleep disturbance  The patient is not nervous/anxious  Physical Exam:  Body mass index is 32 14 kg/m²  /68   Pulse 78   Resp 18   Ht 5' 10\" (1 778 m)   Wt 102 kg (224 lb)   SpO2 98%   BMI 32 14 kg/m²    Wt Readings from Last 3 Encounters:   06/06/23 102 kg (224 lb)   05/10/23 108 kg (238 lb)   04/07/23 108 kg (238 lb 9 6 oz)       Physical Exam  Vitals reviewed  Constitutional:       General: He is not in acute distress  Appearance: He is well-developed  He is obese  He is not ill-appearing   " "  HENT:      Head: Normocephalic and atraumatic  Eyes:      Pupils: Pupils are equal, round, and reactive to light  Neck:      Thyroid: No thyromegaly  Cardiovascular:      Rate and Rhythm: Normal rate and regular rhythm  Pulses: Normal pulses  Heart sounds: Normal heart sounds  Pulmonary:      Effort: Pulmonary effort is normal       Breath sounds: Normal breath sounds  Abdominal:      General: Bowel sounds are normal  There is no distension  Palpations: Abdomen is soft  Tenderness: There is no abdominal tenderness  Musculoskeletal:      Cervical back: Normal range of motion and neck supple  Right lower leg: No edema  Left lower leg: No edema  Lymphadenopathy:      Cervical: No cervical adenopathy  Skin:     General: Skin is warm and dry  Capillary Refill: Capillary refill takes less than 2 seconds  Neurological:      Mental Status: He is alert and oriented to person, place, and time        Gait: Gait normal    Psychiatric:         Mood and Affect: Mood normal          Behavior: Behavior normal            Labs:   Lab Results   Component Value Date    HGBA1C 6 9 (H) 05/26/2023    HGBA1C 6 9 05/26/2023    HGBA1C 8 1 (A) 02/03/2023     Lab Results   Component Value Date    BUN 33 (H) 09/26/2022     09/26/2022    CO2 29 09/26/2022    CREATININE 2 59 (H) 09/26/2022    CREATININE 2 37 (H) 09/20/2021    CREATININE 2 74 (H) 07/31/2021    K 3 9 09/26/2022     06/19/2018     eGFR   Date Value Ref Range Status   09/26/2022 24 ml/min/1 73sq m Final     Lab Results   Component Value Date    CHOL 140 05/15/2018    HDL 34 (L) 10/24/2022    TRIG 126 10/24/2022     Lab Results   Component Value Date    ALKPHOS 37 (L) 07/31/2021    ALT 43 07/31/2021    AST 48 (H) 07/31/2021    BILITOT 0 7 04/11/2018     Lab Results   Component Value Date    CTP3ECMAZVBX 2 740 05/06/2019     No results found for: \"FREET4\", \"TSI\"          Discussed with the patient and all questioned " fully answered  He will call me if any problems arise  Follow-up appointment in 6 months  Counseled patient on diagnostic results, prognosis, risk and benefit of treatment options, instruction for management, importance of treatment compliance, Risk  factor reduction and impressions    There are no Patient Instructions on file for this visit      TORREY White

## 2023-05-31 NOTE — TELEPHONE ENCOUNTER
I think this was a miscommunication, the patient's magnesium level was too high in his bloodstream   I would reduce his magnesium supplement to every other day, please do not increase

## 2023-06-06 ENCOUNTER — OFFICE VISIT (OUTPATIENT)
Dept: ENDOCRINOLOGY | Facility: CLINIC | Age: 68
End: 2023-06-06
Payer: MEDICARE

## 2023-06-06 VITALS
BODY MASS INDEX: 32.07 KG/M2 | DIASTOLIC BLOOD PRESSURE: 68 MMHG | SYSTOLIC BLOOD PRESSURE: 126 MMHG | WEIGHT: 224 LBS | HEIGHT: 70 IN | HEART RATE: 78 BPM | OXYGEN SATURATION: 98 % | RESPIRATION RATE: 18 BRPM

## 2023-06-06 DIAGNOSIS — R80.1 PERSISTENT PROTEINURIA: ICD-10-CM

## 2023-06-06 DIAGNOSIS — N18.4 TYPE 2 DIABETES MELLITUS WITH STAGE 4 CHRONIC KIDNEY DISEASE, WITH LONG-TERM CURRENT USE OF INSULIN (HCC): Primary | ICD-10-CM

## 2023-06-06 DIAGNOSIS — I12.9 HYPERTENSIVE CHRONIC KIDNEY DISEASE WITH STAGE 1 THROUGH STAGE 4 CHRONIC KIDNEY DISEASE, OR UNSPECIFIED CHRONIC KIDNEY DISEASE: ICD-10-CM

## 2023-06-06 DIAGNOSIS — E11.22 TYPE 2 DIABETES MELLITUS WITH STAGE 4 CHRONIC KIDNEY DISEASE, WITH LONG-TERM CURRENT USE OF INSULIN (HCC): Primary | ICD-10-CM

## 2023-06-06 DIAGNOSIS — Z79.4 TYPE 2 DIABETES MELLITUS WITH STAGE 4 CHRONIC KIDNEY DISEASE, WITH LONG-TERM CURRENT USE OF INSULIN (HCC): Primary | ICD-10-CM

## 2023-06-06 PROCEDURE — 99214 OFFICE O/P EST MOD 30 MIN: CPT | Performed by: NURSE PRACTITIONER

## 2023-06-06 RX ORDER — POLYETHYLENE GLYCOL-3350 AND ELECTROLYTES 236; 6.74; 5.86; 2.97; 22.74 G/274.31G; G/274.31G; G/274.31G; G/274.31G; G/274.31G
POWDER, FOR SOLUTION ORAL
COMMUNITY

## 2023-06-06 RX ORDER — DIAPER,BRIEF,INFANT-TODD,DISP
EACH MISCELLANEOUS
COMMUNITY
Start: 2023-03-17

## 2023-06-06 RX ORDER — POLYETHYLENE GLYCOL-3350 AND ELECTROLYTES 236; 6.74; 5.86; 2.97; 22.74 G/274.31G; G/274.31G; G/274.31G; G/274.31G; G/274.31G
POWDER, FOR SOLUTION ORAL
COMMUNITY
Start: 2023-04-25

## 2023-06-06 RX ORDER — ONDANSETRON 4 MG/1
TABLET, FILM COATED ORAL
COMMUNITY
Start: 2023-04-25

## 2023-06-06 NOTE — ASSESSMENT & PLAN NOTE
Lab Results   Component Value Date    CREATININE 2 59 (H) 09/26/2022    CREATININE 2 37 (H) 09/20/2021    CREATININE 2 74 (H) 07/31/2021    EGFR 24 09/26/2022    EGFR 27 09/20/2021    EGFR 23 07/31/2021   BP stable at 126/68  Continue current regimen

## 2023-06-06 NOTE — ASSESSMENT & PLAN NOTE
Unable to tolerate SGLT2 due to adverse reaction of a rash  Can consider the addition of  kerendia  Will defer to nephrology management

## 2023-06-06 NOTE — ASSESSMENT & PLAN NOTE
Patient is well controlled  Continue Ozempic and Starlix  Continue with healthy diet and physical activity as tolerated  Patient knows to notify me with persistent hyperglycemia or episodes of hypoglycemia  F/u in 6 months     Lab Results   Component Value Date    HGBA1C 6 9 (H) 05/26/2023

## 2023-06-12 DIAGNOSIS — F32.A DEPRESSION, UNSPECIFIED DEPRESSION TYPE: ICD-10-CM

## 2023-06-12 NOTE — TELEPHONE ENCOUNTER
Please contact patient regarding Zoloft, it looks like our office last refilled it for him, however we do not usually do that unless he needed it as an emergency    Can you please ask him who provided the initial prescription so it can be properly filled

## 2023-06-12 NOTE — TELEPHONE ENCOUNTER
Please contact PCP office and ask for this to be refilled if appropriate   I will decline refill from our end

## 2023-06-13 DIAGNOSIS — F32.A DEPRESSION, UNSPECIFIED DEPRESSION TYPE: ICD-10-CM

## 2023-06-30 ENCOUNTER — APPOINTMENT (OUTPATIENT)
Dept: RADIOLOGY | Facility: CLINIC | Age: 68
End: 2023-06-30
Payer: MEDICARE

## 2023-06-30 ENCOUNTER — APPOINTMENT (OUTPATIENT)
Dept: LAB | Facility: CLINIC | Age: 68
End: 2023-06-30
Payer: MEDICARE

## 2023-06-30 DIAGNOSIS — K62.5 RECTAL BLEEDING: ICD-10-CM

## 2023-06-30 LAB
ALBUMIN SERPL BCP-MCNC: 3.7 G/DL (ref 3.5–5)
ALP SERPL-CCNC: 64 U/L (ref 46–116)
ALT SERPL W P-5'-P-CCNC: 166 U/L (ref 12–78)
ANION GAP SERPL CALCULATED.3IONS-SCNC: 3 MMOL/L
AST SERPL W P-5'-P-CCNC: 69 U/L (ref 5–45)
BASOPHILS # BLD AUTO: 0.03 THOUSANDS/ÂΜL (ref 0–0.1)
BASOPHILS NFR BLD AUTO: 0 % (ref 0–1)
BILIRUB SERPL-MCNC: 0.47 MG/DL (ref 0.2–1)
BUN SERPL-MCNC: 36 MG/DL (ref 5–25)
CALCIUM SERPL-MCNC: 9.3 MG/DL (ref 8.3–10.1)
CHLORIDE SERPL-SCNC: 106 MMOL/L (ref 96–108)
CO2 SERPL-SCNC: 32 MMOL/L (ref 21–32)
CREAT SERPL-MCNC: 2.59 MG/DL (ref 0.6–1.3)
EOSINOPHIL # BLD AUTO: 0.12 THOUSAND/ÂΜL (ref 0–0.61)
EOSINOPHIL NFR BLD AUTO: 1 % (ref 0–6)
ERYTHROCYTE [DISTWIDTH] IN BLOOD BY AUTOMATED COUNT: 13.9 % (ref 11.6–15.1)
ERYTHROCYTE [SEDIMENTATION RATE] IN BLOOD: 31 MM/HOUR (ref 0–19)
GFR SERPL CREATININE-BSD FRML MDRD: 24 ML/MIN/1.73SQ M
GLUCOSE SERPL-MCNC: 209 MG/DL (ref 65–140)
HCT VFR BLD AUTO: 41.9 % (ref 36.5–49.3)
HGB BLD-MCNC: 13.1 G/DL (ref 12–17)
IMM GRANULOCYTES # BLD AUTO: 0.03 THOUSAND/UL (ref 0–0.2)
IMM GRANULOCYTES NFR BLD AUTO: 0 % (ref 0–2)
INR PPP: 1.28 (ref 0.84–1.19)
LYMPHOCYTES # BLD AUTO: 1.47 THOUSANDS/ÂΜL (ref 0.6–4.47)
LYMPHOCYTES NFR BLD AUTO: 17 % (ref 14–44)
MCH RBC QN AUTO: 25.9 PG (ref 26.8–34.3)
MCHC RBC AUTO-ENTMCNC: 31.3 G/DL (ref 31.4–37.4)
MCV RBC AUTO: 83 FL (ref 82–98)
MONOCYTES # BLD AUTO: 0.59 THOUSAND/ÂΜL (ref 0.17–1.22)
MONOCYTES NFR BLD AUTO: 7 % (ref 4–12)
NEUTROPHILS # BLD AUTO: 6.42 THOUSANDS/ÂΜL (ref 1.85–7.62)
NEUTS SEG NFR BLD AUTO: 75 % (ref 43–75)
NRBC BLD AUTO-RTO: 0 /100 WBCS
PLATELET # BLD AUTO: 213 THOUSANDS/UL (ref 149–390)
PMV BLD AUTO: 11.3 FL (ref 8.9–12.7)
POTASSIUM SERPL-SCNC: 4.2 MMOL/L (ref 3.5–5.3)
PROT SERPL-MCNC: 7.3 G/DL (ref 6.4–8.4)
PROTHROMBIN TIME: 16.3 SECONDS (ref 11.6–14.5)
RBC # BLD AUTO: 5.05 MILLION/UL (ref 3.88–5.62)
SODIUM SERPL-SCNC: 141 MMOL/L (ref 135–147)
WBC # BLD AUTO: 8.66 THOUSAND/UL (ref 4.31–10.16)

## 2023-06-30 PROCEDURE — 80053 COMPREHEN METABOLIC PANEL: CPT

## 2023-06-30 PROCEDURE — 36415 COLL VENOUS BLD VENIPUNCTURE: CPT

## 2023-06-30 PROCEDURE — 85652 RBC SED RATE AUTOMATED: CPT

## 2023-06-30 PROCEDURE — 74022 RADEX COMPL AQT ABD SERIES: CPT

## 2023-06-30 PROCEDURE — 85025 COMPLETE CBC W/AUTO DIFF WBC: CPT

## 2023-06-30 PROCEDURE — 85610 PROTHROMBIN TIME: CPT

## 2023-08-07 ENCOUNTER — OFFICE VISIT (OUTPATIENT)
Dept: URGENT CARE | Facility: CLINIC | Age: 68
End: 2023-08-07
Payer: MEDICARE

## 2023-08-07 VITALS
DIASTOLIC BLOOD PRESSURE: 70 MMHG | SYSTOLIC BLOOD PRESSURE: 124 MMHG | OXYGEN SATURATION: 97 % | TEMPERATURE: 97.7 F | RESPIRATION RATE: 18 BRPM | HEART RATE: 64 BPM

## 2023-08-07 DIAGNOSIS — H61.23 BILATERAL IMPACTED CERUMEN: Primary | ICD-10-CM

## 2023-08-07 PROCEDURE — 99213 OFFICE O/P EST LOW 20 MIN: CPT

## 2023-08-07 PROCEDURE — G0463 HOSPITAL OUTPT CLINIC VISIT: HCPCS

## 2023-08-07 PROCEDURE — 69209 REMOVE IMPACTED EAR WAX UNI: CPT

## 2023-08-07 RX ORDER — CEPHALEXIN 500 MG/1
CAPSULE ORAL
COMMUNITY
Start: 2023-06-21

## 2023-08-07 NOTE — PROGRESS NOTES
[facility]        NAME: Woody Host is a 77 y.o. male  : 1955    MRN: [id number]  DATE: 2023  TIME: 1:05 PM    Assessment and Plan   Bilateral impacted cerumen [H61.23]  1. Bilateral impacted cerumen  Ear cerumen removal            Patient Instructions     Continue the use of OTC Debrox  Follow up with PCP in 3-5 days. Proceed to  ER if symptoms worsen. Chief Complaint     Chief Complaint   Patient presents with   • Ear Fullness     Both ears, started 3 days ago         History of Present Illness       Patient is a 76 yo male with no significant PMH presenting in the clinic today for b/l ear fullness x 3 days. Patient notes he wears hearing aids daily. Admits left ear tinnitus and b/l ear fullness. Denies fever, chills, ear pain, headache, dizziness, sore throat, neck pain, chest pain, and SOB. Admits the use of Debrox and peroxide for symptom management. Review of Systems   Review of Systems   Constitutional: Negative for chills, fatigue and fever. HENT: Positive for hearing loss and tinnitus. Negative for congestion, ear discharge, ear pain, postnasal drip, rhinorrhea, sinus pressure, sinus pain and sore throat. Eyes: Negative for visual disturbance. Respiratory: Negative for cough and shortness of breath. Cardiovascular: Negative for chest pain. Gastrointestinal: Negative for abdominal pain, diarrhea, nausea and vomiting. Musculoskeletal: Negative for myalgias. Skin: Negative for rash. Neurological: Negative for dizziness and headaches.          Current Medications       Current Outpatient Medications:   •  albuterol (PROVENTIL HFA,VENTOLIN HFA) 90 mcg/act inhaler, Inhale 2 puffs every 4 (four) hours as needed, Disp: , Rfl:   •  amLODIPine (NORVASC) 10 mg tablet, , Disp: , Rfl:   •  ammonium lactate (LAC-HYDRIN) 12 % cream, , Disp: , Rfl:   •  aspirin 81 mg chewable tablet, Chew 81 mg, Disp: , Rfl:   •  atorvastatin (LIPITOR) 80 mg tablet, Take by mouth, Disp: , Rfl:   •  cholecalciferol (VITAMIN D3) 1,000 units tablet, Take 1 tablet (1,000 Units total) by mouth daily, Disp: 90 tablet, Rfl: 1  •  fluticasone (FLONASE) 50 mcg/act nasal spray, 2 sprays into each nostril daily, Disp: 16 g, Rfl: 3  •  glucose blood (OneTouch Verio) test strip, Use to test blood sugar three times daily. , Disp: 200 each, Rfl: 3  •  hydrochlorothiazide (HYDRODIURIL) 25 mg tablet, , Disp: , Rfl:   •  hydrocortisone 0.5 % cream, APPLY CREAM TOPICALLY TO AFFECTED AREA ONCE DAILY, Disp: , Rfl:   •  hydrocortisone 2.5 % cream, as needed, Disp: , Rfl:   •  Insulin Pen Needle (Pen Needles) 32G X 4 MM MISC, Use to inject insulin once daily. , Disp: 100 each, Rfl: 2  •  isosorbide mononitrate (IMDUR) 30 mg 24 hr tablet, Take 30 mg by mouth 2 (two) times a day, Disp: , Rfl:   •  Magnesium Oxide 500 MG TABS, Take as directed, Disp: , Rfl:   •  metoprolol tartrate (LOPRESSOR) 50 mg tablet, Take 50 mg by mouth every 12 (twelve) hours, Disp: , Rfl:   •  Multiple Vitamin (MULTIVITAMIN ADULT PO), Take by mouth, Disp: , Rfl:   •  nateglinide (STARLIX) 120 mg tablet, take 1 tablet by mouth three times a day before meals, Disp: 270 tablet, Rfl: 1  •  OneTouch Delica Lancets 48M MISC, Use to test blood sugar three times daily. , Disp: 200 each, Rfl: 3  •  rivaroxaban (XARELTO) 2.5 mg tablet, Take 2.5 mg by mouth 2 (two) times a day , Disp: , Rfl:   •  semaglutide, 2 mg/dose, (Ozempic, 2 MG/DOSE,) 8 mg/ mL injection pen, Inject 0.75 mL (2 mg total) under the skin every 7 days, Disp: 9 mL, Rfl: 1  •  sertraline (ZOLOFT) 50 mg tablet, Take 1 tablet (50 mg total) by mouth daily, Disp: 90 tablet, Rfl: 0  •  traZODone (DESYREL) 100 mg tablet, , Disp: , Rfl:   •  cephalexin (KEFLEX) 500 mg capsule, TAKE 2 TABLETS BY MOUTH TONIGHT, TAKE 1 IN THE MORNING ON THE DAY OF SURGERY, THEN TAKE 1 TABLET THREE TIMES DAILY THEREAFTER UNTIL GONE (Patient not taking: Reported on 8/7/2023), Disp: , Rfl:   •  EQ Gentle Laxative 5 MG EC tablet, TAKE 2 TABLETS BY MOUTH AT 3 AT NIGHT THE DAY PRIOR TO PROCEDURE AND 2 TABLETS AT 7 AT NIGHT (Patient not taking: Reported on 8/7/2023), Disp: , Rfl:   •  GaviLyte-G 236 g solution, Use as directed (Patient not taking: Reported on 8/7/2023), Disp: , Rfl:   •  nitroglycerin (NITROSTAT) 0.4 mg SL tablet, , Disp: , Rfl:   •  ondansetron (ZOFRAN) 4 mg tablet, TAKE 1 TABLET BY MOUTH ONE HOUR PRIOR TO EACH DOSE OF PREP (Patient not taking: Reported on 8/7/2023), Disp: , Rfl:   •  polyethylene glycol (GaviLyte-G) 4000 mL solution, GaviLyte-G 236 gram-22.74 gram-6.74 gram-5.86 gram oral solution  USE AS DIRECTED (Patient not taking: Reported on 8/7/2023), Disp: , Rfl:     Current Allergies     Allergies as of 08/07/2023 - Reviewed 08/07/2023   Allergen Reaction Noted   • Tetracyclines & related Rash 10/18/2020   • Oxycodone-acetaminophen Vomiting 11/14/2019   • Tetracycline  02/02/2017   • Jardiance [empagliflozin] Rash 06/06/2023            The following portions of the patient's history were reviewed and updated as appropriate: allergies, current medications, past family history, past medical history, past social history, past surgical history and problem list.     Past Medical History:   Diagnosis Date   • Angina pectoris, unspecified ([address])    • Cancer of kidney ([address]) 2019   • Chronic kidney disease, stage 2 (mild)    • Chronic kidney disease, stage 3 ([address])    • Diabetes mellitus ([address])    • Hypercholesterolemia    • Hypertension    • Nephrolithiasis     calcium oxalate - had lithotripsy    • Seasonal allergies    • Stage 3 chronic kidney disease ([address]) 11/14/2019       Past Surgical History:   Procedure Laterality Date   • ARTHROSCOPY KNEE Bilateral    • CARDIAC SURGERY      balloon procedure and stent placement    • FOOT SURGERY Right     implants   • LAMINECTOMY     • NEPHRECTOMY Left 06/2019   • PARTIAL HIP ARTHROPLASTY Right    • ROTATOR CUFF REPAIR     • SHOULDER ARTHROSCOPY Right    • SHOULDER SURGERY Left     removal of bone spur       Family History   Problem Relation Age of Onset   • Diabetes Mother    • Diabetes type II Mother    • Heart attack Father    • Heart disease Father          Medications have been verified. Objective   /70   Pulse 64   Temp 97.7 °F (36.5 °C)   Resp 18   SpO2 97%        Physical Exam     Physical Exam  Vitals reviewed. Constitutional:       General: He is not in acute distress. Appearance: Normal appearance. He is normal weight. He is not ill-appearing. HENT:      Head: Normocephalic. Right Ear: Hearing, tympanic membrane, ear canal and external ear normal. No middle ear effusion. There is impacted cerumen. Tympanic membrane is not erythematous or bulging. Left Ear: Hearing, tympanic membrane, ear canal and external ear normal.  No middle ear effusion. There is impacted cerumen. Tympanic membrane is not erythematous or bulging. Ears:      Comments: B/L TMs were able to be examined post-cerumen removal     Nose: Nose normal. No congestion or rhinorrhea. Right Sinus: No maxillary sinus tenderness or frontal sinus tenderness. Left Sinus: No maxillary sinus tenderness or frontal sinus tenderness. Mouth/Throat:      Lips: Pink. Mouth: Mucous membranes are moist.      Pharynx: Oropharynx is clear. Uvula midline. No pharyngeal swelling, oropharyngeal exudate, posterior oropharyngeal erythema or uvula swelling. Eyes:      General:         Right eye: No discharge. Left eye: No discharge. Conjunctiva/sclera: Conjunctivae normal.   Cardiovascular:      Rate and Rhythm: Normal rate and regular rhythm. Pulses: Normal pulses. Heart sounds: Normal heart sounds. No murmur heard. No friction rub. No gallop. Pulmonary:      Effort: Pulmonary effort is normal.      Breath sounds: Normal breath sounds. No wheezing, rhonchi or rales. Musculoskeletal:      Cervical back: Normal range of motion and neck supple.  No tenderness. Lymphadenopathy:      Cervical: No cervical adenopathy. Skin:     General: Skin is warm. Findings: No rash. Neurological:      Mental Status: He is alert. Psychiatric:         Mood and Affect: Mood normal.         Behavior: Behavior normal.       Ear cerumen removal    Date/Time: 8/7/2023 11:25 AM    Performed by: Antonino Pena PA-C  Authorized by: Antonino Pena PA-C  Universal Protocol:  Consent: Verbal consent obtained. Risks and benefits: risks, benefits and alternatives were discussed  Consent given by: patient  Patient identity confirmed: verbally with patient      Patient location:  Clinic  Procedure details:     Location:  R ear and L ear    Procedure type: irrigation only      Approach:  External  Post-procedure details:     Complication:  None    Hearing quality:  Improved    Patient tolerance of procedure:   Tolerated well, no immediate complications    \

## 2023-08-24 ENCOUNTER — TELEPHONE (OUTPATIENT)
Dept: NEPHROLOGY | Facility: CLINIC | Age: 68
End: 2023-08-24

## 2023-08-24 NOTE — TELEPHONE ENCOUNTER
Tried reaching patient, no answer. Left message on voicemail for patient to spoke to patient to have lab/urine studies done for upcoming appt.

## 2023-08-28 LAB
CREAT ?TM UR-SCNC: 130.5 UMOL/L
EXT ALBUMIN URINE RANDOM: 38.1
MICROALBUMIN/CREAT UR: 292 MG/G{CREAT}

## 2023-08-30 ENCOUNTER — OFFICE VISIT (OUTPATIENT)
Dept: NEPHROLOGY | Facility: CLINIC | Age: 68
End: 2023-08-30
Payer: MEDICARE

## 2023-08-30 VITALS
OXYGEN SATURATION: 98 % | HEIGHT: 70 IN | WEIGHT: 212 LBS | SYSTOLIC BLOOD PRESSURE: 120 MMHG | BODY MASS INDEX: 30.35 KG/M2 | DIASTOLIC BLOOD PRESSURE: 64 MMHG | HEART RATE: 66 BPM

## 2023-08-30 DIAGNOSIS — E08.22 DIABETES DUE TO UNDRL COND W DIABETIC CHRONIC KIDNEY DISEASE (HCC): ICD-10-CM

## 2023-08-30 DIAGNOSIS — E11.21 DIABETIC NEPHROPATHY ASSOCIATED WITH TYPE 2 DIABETES MELLITUS (HCC): ICD-10-CM

## 2023-08-30 DIAGNOSIS — Z90.5 ACQUIRED ABSENCE OF KIDNEY: ICD-10-CM

## 2023-08-30 DIAGNOSIS — N18.4 CKD (CHRONIC KIDNEY DISEASE), STAGE IV (HCC): Primary | ICD-10-CM

## 2023-08-30 DIAGNOSIS — N20.0 NEPHROLITHIASIS: ICD-10-CM

## 2023-08-30 DIAGNOSIS — I12.9 HYPERTENSIVE CHRONIC KIDNEY DISEASE WITH STAGE 1 THROUGH STAGE 4 CHRONIC KIDNEY DISEASE, OR UNSPECIFIED CHRONIC KIDNEY DISEASE: ICD-10-CM

## 2023-08-30 PROCEDURE — 99214 OFFICE O/P EST MOD 30 MIN: CPT | Performed by: INTERNAL MEDICINE

## 2023-08-30 RX ORDER — BLOOD SUGAR DIAGNOSTIC
STRIP MISCELLANEOUS
Qty: 200 EACH | Refills: 3 | Status: SHIPPED | OUTPATIENT
Start: 2023-08-30

## 2023-08-30 NOTE — ASSESSMENT & PLAN NOTE
Patient's proteinuria has significantly improved, down to about 200 mg/gr from about 950 mg/gr. Patient is currently losing significant weight, control blood sugars very well, medications as prescribed. Previously we had placed the patient on SGLT-2 inhibitor unfortunately, this developed into an allergic reaction with a rash. Given improvement in proteinuria, I am hesitant to try a different SGLT-2 inhibitor. As mentioned previously, patient cannot tolerate RAAS inhibition from the kidney function standpoint.     Lab Results   Component Value Date    HGBA1C 6.9 (H) 05/26/2023

## 2023-08-30 NOTE — ASSESSMENT & PLAN NOTE
Continue to encourage low-sodium diet, 2 L of water intake a day, patient taking hydrochlorothiazide with no specific side effects.

## 2023-08-30 NOTE — ASSESSMENT & PLAN NOTE
Lab Results   Component Value Date    EGFR 24 06/30/2023    EGFR 24 09/26/2022    EGFR 27 09/20/2021    CREATININE 2.59 (H) 06/30/2023    CREATININE 2.59 (H) 09/26/2022    CREATININE 2.37 (H) 09/20/2021   Blood pressure well controlled, continue to encourage low-sodium diet. No medication changes made during today's visit.

## 2023-08-30 NOTE — ASSESSMENT & PLAN NOTE
Lab Results   Component Value Date    EGFR 24 06/30/2023    EGFR 24 09/26/2022    EGFR 27 09/20/2021    CREATININE 2.59 (H) 06/30/2023    CREATININE 2.59 (H) 09/26/2022    CREATININE 2.37 (H) 09/20/2021     Kidney function stable with a creatinine of around 2.6 mg/dL, baseline creatinine tends to be between 2.4-2.6 mg/dL. Continue to optimize care, avoid potential nephrotoxins. Blood pressures are well controlled during today's visit.

## 2023-08-30 NOTE — PROGRESS NOTES
[facility]    Name: Dacia Martin  YOB: 1955      Assessment/Plan:    CKD (chronic kidney disease), stage IV [facility])  Lab Results   Component Value Date    EGFR 24 06/30/2023    EGFR 24 09/26/2022    EGFR 27 09/20/2021    CREATININE 2.59 (H) 06/30/2023    CREATININE 2.59 (H) 09/26/2022    CREATININE 2.37 (H) 09/20/2021     Kidney function stable with a creatinine of around 2.6 mg/dL, baseline creatinine tends to be between 2.4-2.6 mg/dL. Continue to optimize care, avoid potential nephrotoxins. Blood pressures are well controlled during today's visit. Diabetic nephropathy associated with type 2 diabetes mellitus ([address])  Patient's proteinuria has significantly improved, down to about 200 mg/gr from about 950 mg/gr. Patient is currently losing significant weight, control blood sugars very well, medications as prescribed. Previously we had placed the patient on SGLT-2 inhibitor unfortunately, this developed into an allergic reaction with a rash. Given improvement in proteinuria, I am hesitant to try a different SGLT-2 inhibitor. As mentioned previously, patient cannot tolerate RAAS inhibition from the kidney function standpoint. Lab Results   Component Value Date    HGBA1C 6.9 (H) 05/26/2023       Hypertensive chronic kidney disease with stage 1 through stage 4 chronic kidney disease, or unspecified chronic kidney disease  Lab Results   Component Value Date    EGFR 24 06/30/2023    EGFR 24 09/26/2022    EGFR 27 09/20/2021    CREATININE 2.59 (H) 06/30/2023    CREATININE 2.59 (H) 09/26/2022    CREATININE 2.37 (H) 09/20/2021   Blood pressure well controlled, continue to encourage low-sodium diet. No medication changes made during today's visit. Nephrolithiasis  Continue to encourage low-sodium diet, 2 L of water intake a day, patient taking hydrochlorothiazide with no specific side effects.     Acquired absence of kidney  Status post left total nephrectomy in June 2019 due to renal cell cancer. Problem List Items Addressed This Visit        Endocrine    Diabetic nephropathy associated with type 2 diabetes mellitus ([address])     Patient's proteinuria has significantly improved, down to about 200 mg/gr from about 950 mg/gr. Patient is currently losing significant weight, control blood sugars very well, medications as prescribed. Previously we had placed the patient on SGLT-2 inhibitor unfortunately, this developed into an allergic reaction with a rash. Given improvement in proteinuria, I am hesitant to try a different SGLT-2 inhibitor. As mentioned previously, patient cannot tolerate RAAS inhibition from the kidney function standpoint. Lab Results   Component Value Date    HGBA1C 6.9 (H) 05/26/2023               Genitourinary    CKD (chronic kidney disease), stage IV [facility]) - Primary     Lab Results   Component Value Date    EGFR 24 06/30/2023    EGFR 24 09/26/2022    EGFR 27 09/20/2021    CREATININE 2.59 (H) 06/30/2023    CREATININE 2.59 (H) 09/26/2022    CREATININE 2.37 (H) 09/20/2021     Kidney function stable with a creatinine of around 2.6 mg/dL, baseline creatinine tends to be between 2.4-2.6 mg/dL. Continue to optimize care, avoid potential nephrotoxins. Blood pressures are well controlled during today's visit. Relevant Orders    Comprehensive metabolic panel    CBC and differential    Albumin / creatinine urine ratio    Urinalysis with microscopic    Magnesium    Phosphorus    PTH, intact    Vitamin D 25 hydroxy    Hypertensive chronic kidney disease with stage 1 through stage 4 chronic kidney disease, or unspecified chronic kidney disease     Lab Results   Component Value Date    EGFR 24 06/30/2023    EGFR 24 09/26/2022    EGFR 27 09/20/2021    CREATININE 2.59 (H) 06/30/2023    CREATININE 2.59 (H) 09/26/2022    CREATININE 2.37 (H) 09/20/2021   Blood pressure well controlled, continue to encourage low-sodium diet.   No medication changes made during today's visit. Nephrolithiasis     Continue to encourage low-sodium diet, 2 L of water intake a day, patient taking hydrochlorothiazide with no specific side effects. Other    Acquired absence of kidney     Status post left total nephrectomy in June 2019 due to renal cell cancer. Patient is stable from a renal standpoint, back in about 6 months. The patient may be moving away from the area in which case he was wished well otherwise we will see him in the early spring 2023. Subjective:      Patient ID: Carlo Tomlin is a 77 y.o. male. Patient presents for follow up. We reviewed labs in detail with the patient. Creatinine is slightly improved at 2.6 mg/dL, eGFR is about 30 ml/min, there were no electrolyte abnormalities noted. He is avoiding all NSAIDs. He is taking all medications as prescribed with no side-effects. Hypertension  This is a chronic problem. The current episode started more than 1 year ago. The problem is unchanged. The problem is controlled. Pertinent negatives include no chest pain, orthopnea or peripheral edema. There are no associated agents to hypertension. Risk factors for coronary artery disease include male gender. Past treatments include lifestyle changes, beta blockers and calcium channel blockers. There are no compliance problems. Hypertensive end-organ damage includes kidney disease. Identifiable causes of hypertension include chronic renal disease. The following portions of the patient's history were reviewed and updated as appropriate: allergies, current medications, past family history, past medical history, past social history, past surgical history and problem list.    Review of Systems   Cardiovascular: Negative for chest pain and orthopnea. All other systems reviewed and are negative.         Social History     Socioeconomic History   • Marital status: /Civil Union     Spouse name: None • Number of children: None   • Years of education: None   • Highest education level: None   Occupational History   • Occupation:     Tobacco Use   • Smoking status: Never   • Smokeless tobacco: Never   Vaping Use   • Vaping Use: Never used   Substance and Sexual Activity   • Alcohol use: No   • Drug use: No   • Sexual activity: Yes     Partners: Female   Other Topics Concern   • None   Social History Narrative    Patient is a former smoker - As per [employer]    Current drug user - As per [employer]    Consumes on average 1 cup of regular coffee per day      Social Determinants of Health     Financial Resource Strain: Not on file   Food Insecurity: Not on file   Transportation Needs: Not on file   Physical Activity: Not on file   Stress: Not on file   Social Connections: Not on file   Intimate Partner Violence: Not on file   Housing Stability: Not on file     Past Medical History:   Diagnosis Date   • Angina pectoris, unspecified ([address])    • Cancer of kidney ([address]) 2019   • Chronic kidney disease, stage 2 (mild)    • Chronic kidney disease, stage 3 ([address])    • Diabetes mellitus ([address])    • Hypercholesterolemia    • Hypertension    • Nephrolithiasis     calcium oxalate - had lithotripsy    • Seasonal allergies    • Stage 3 chronic kidney disease ([address]) 11/14/2019     Past Surgical History:   Procedure Laterality Date   • ARTHROSCOPY KNEE Bilateral    • CARDIAC SURGERY      balloon procedure and stent placement    • FOOT SURGERY Right     implants   • LAMINECTOMY     • NEPHRECTOMY Left 06/2019   • PARTIAL HIP ARTHROPLASTY Right    • ROTATOR CUFF REPAIR     • SHOULDER ARTHROSCOPY Right    • SHOULDER SURGERY Left     removal of bone spur       Current Outpatient Medications:   •  amLODIPine (NORVASC) 10 mg tablet, , Disp: , Rfl:   •  ammonium lactate (LAC-HYDRIN) 12 % cream, , Disp: , Rfl:   •  aspirin 81 mg chewable tablet, Chew 81 mg, Disp: , Rfl:   •  atorvastatin (LIPITOR) 80 mg tablet, Take by mouth, Disp: , Rfl: •  cholecalciferol (VITAMIN D3) 1,000 units tablet, Take 1 tablet (1,000 Units total) by mouth daily, Disp: 90 tablet, Rfl: 1  •  fluticasone (FLONASE) 50 mcg/act nasal spray, 2 sprays into each nostril daily, Disp: 16 g, Rfl: 3  •  glucose blood (OneTouch Verio) test strip, Use to test blood sugar three times daily. , Disp: 200 each, Rfl: 3  •  hydrochlorothiazide (HYDRODIURIL) 25 mg tablet, , Disp: , Rfl:   •  hydrocortisone 0.5 % cream, APPLY CREAM TOPICALLY TO AFFECTED AREA ONCE DAILY, Disp: , Rfl:   •  hydrocortisone 2.5 % cream, as needed, Disp: , Rfl:   •  Insulin Pen Needle (Pen Needles) 32G X 4 MM MISC, Use to inject insulin once daily. , Disp: 100 each, Rfl: 2  •  isosorbide mononitrate (IMDUR) 30 mg 24 hr tablet, Take 30 mg by mouth 2 (two) times a day, Disp: , Rfl:   •  Magnesium Oxide 500 MG TABS, Take as directed, Disp: , Rfl:   •  metoprolol tartrate (LOPRESSOR) 50 mg tablet, Take 50 mg by mouth every 12 (twelve) hours, Disp: , Rfl:   •  Multiple Vitamin (MULTIVITAMIN ADULT PO), Take by mouth, Disp: , Rfl:   •  nateglinide (STARLIX) 120 mg tablet, take 1 tablet by mouth three times a day before meals, Disp: 270 tablet, Rfl: 1  •  OneTouch Delica Lancets 03U MISC, Use to test blood sugar three times daily. , Disp: 200 each, Rfl: 3  •  rivaroxaban (XARELTO) 2.5 mg tablet, Take 2.5 mg by mouth 2 (two) times a day , Disp: , Rfl:   •  semaglutide, 2 mg/dose, (Ozempic, 2 MG/DOSE,) 8 mg/ mL injection pen, Inject 0.75 mL (2 mg total) under the skin every 7 days, Disp: 9 mL, Rfl: 1  •  sertraline (ZOLOFT) 50 mg tablet, Take 1 tablet (50 mg total) by mouth daily, Disp: 90 tablet, Rfl: 0  •  traZODone (DESYREL) 100 mg tablet, , Disp: , Rfl:     Lab Results   Component Value Date     06/19/2018    SODIUM 141 06/30/2023    K 4.2 06/30/2023     06/30/2023    CO2 32 06/30/2023    ANIONGAP 10.3 06/19/2018    AGAP 3 06/30/2023    BUN 36 (H) 06/30/2023    CREATININE 2.59 (H) 06/30/2023    GLUC 209 (H) 06/30/2023    GLUF 161 (H) 09/26/2022    CALCIUM 9.3 06/30/2023    AST 69 (H) 06/30/2023     (H) 06/30/2023    ALKPHOS 64 06/30/2023    PROT 6.6 04/11/2018    TP 7.3 06/30/2023    BILITOT 0.7 04/11/2018    TBILI 0.47 06/30/2023    EGFR 24 06/30/2023     Lab Results   Component Value Date    WBC 8.66 06/30/2023    HGB 13.1 06/30/2023    HCT 41.9 06/30/2023    MCV 83 06/30/2023     06/30/2023     Lab Results   Component Value Date    CHOLESTEROL 113 10/24/2022    CHOLESTEROL 134 03/16/2020    CHOLESTEROL 131 05/06/2019     Lab Results   Component Value Date    HDL 34 (L) 10/24/2022    HDL 35 (L) 03/16/2020    HDL 40 05/06/2019     Lab Results   Component Value Date    LDLCALC 54 10/24/2022    LDLCALC 72 03/16/2020    LDLCALC 73 05/06/2019     Lab Results   Component Value Date    TRIG 126 10/24/2022    TRIG 136 03/16/2020    TRIG 88 05/06/2019     No results found for: "CHOLHDL"  Lab Results   Component Value Date    LQA2VUWPMSWS 2.740 05/06/2019     Lab Results   Component Value Date    CALCIUM 9.3 06/30/2023    PHOS 2.5 (L) 04/11/2018     No results found for: "SPEP", "UPEP"  No results found for: " Anibal", "[id number]"        Objective:      /64 (BP Location: Left arm, Patient Position: Sitting, Cuff Size: Large)   Pulse 66   Ht 5' 10" (1.778 m)   Wt 96.2 kg (212 lb)   SpO2 98%   BMI 30.42 kg/m²          Physical Exam  Vitals reviewed. Constitutional:       General: He is not in acute distress. Appearance: He is well-developed. HENT:      Head: Normocephalic and atraumatic. Eyes:      Conjunctiva/sclera: Conjunctivae normal.   Cardiovascular:      Rate and Rhythm: Normal rate and regular rhythm. Pulmonary:      Effort: Pulmonary effort is normal.      Breath sounds: Normal breath sounds. Abdominal:      Palpations: Abdomen is soft. Musculoskeletal:      Cervical back: Neck supple. Skin:     General: Skin is warm. Findings: No rash.    Neurological:      Mental Status: He is alert and oriented to person, place, and time. Cranial Nerves: No cranial nerve deficit.    Psychiatric:         Behavior: Behavior normal.

## 2023-09-05 DIAGNOSIS — F32.A DEPRESSION, UNSPECIFIED DEPRESSION TYPE: ICD-10-CM

## 2023-09-25 DIAGNOSIS — N18.4 TYPE 2 DIABETES MELLITUS WITH STAGE 4 CHRONIC KIDNEY DISEASE, WITH LONG-TERM CURRENT USE OF INSULIN (HCC): ICD-10-CM

## 2023-09-25 DIAGNOSIS — E08.22 DIABETES DUE TO UNDRL COND W DIABETIC CHRONIC KIDNEY DISEASE (HCC): ICD-10-CM

## 2023-09-25 DIAGNOSIS — E11.22 TYPE 2 DIABETES MELLITUS WITH STAGE 4 CHRONIC KIDNEY DISEASE, WITH LONG-TERM CURRENT USE OF INSULIN (HCC): ICD-10-CM

## 2023-09-25 DIAGNOSIS — Z79.4 TYPE 2 DIABETES MELLITUS WITH STAGE 4 CHRONIC KIDNEY DISEASE, WITH LONG-TERM CURRENT USE OF INSULIN (HCC): ICD-10-CM

## 2023-09-26 RX ORDER — BLOOD SUGAR DIAGNOSTIC
STRIP MISCELLANEOUS
Qty: 200 EACH | Refills: 3 | Status: SHIPPED | OUTPATIENT
Start: 2023-09-26 | End: 2023-10-05 | Stop reason: SDUPTHER

## 2023-10-03 ENCOUNTER — HOSPITAL ENCOUNTER (OUTPATIENT)
Dept: CT IMAGING | Facility: HOSPITAL | Age: 68
Discharge: HOME | End: 2023-10-03
Payer: MEDICARE

## 2023-10-03 ENCOUNTER — APPOINTMENT (OUTPATIENT)
Dept: LAB | Facility: HOSPITAL | Age: 68
End: 2023-10-03
Payer: MEDICARE

## 2023-10-03 DIAGNOSIS — C64.2 MALIGNANT NEOPLASM OF LEFT KIDNEY, EXCEPT RENAL PELVIS (HCC): ICD-10-CM

## 2023-10-03 DIAGNOSIS — N40.0 BENIGN PROSTATIC HYPERPLASIA, UNSPECIFIED WHETHER LOWER URINARY TRACT SYMPTOMS PRESENT: ICD-10-CM

## 2023-10-03 LAB
ANION GAP SERPL CALCULATED.3IONS-SCNC: 10 MMOL/L
BUN SERPL-MCNC: 32 MG/DL (ref 5–25)
CALCIUM SERPL-MCNC: 9.3 MG/DL (ref 8.4–10.2)
CHLORIDE SERPL-SCNC: 103 MMOL/L (ref 96–108)
CO2 SERPL-SCNC: 28 MMOL/L (ref 21–32)
CREAT SERPL-MCNC: 2.06 MG/DL (ref 0.6–1.3)
GFR SERPL CREATININE-BSD FRML MDRD: 31 ML/MIN/1.73SQ M
GLUCOSE P FAST SERPL-MCNC: 158 MG/DL (ref 65–99)
POTASSIUM SERPL-SCNC: 3.8 MMOL/L (ref 3.5–5.3)
PSA SERPL-MCNC: 0.83 NG/ML (ref 0–4)
SODIUM SERPL-SCNC: 141 MMOL/L (ref 135–147)

## 2023-10-03 PROCEDURE — 36415 COLL VENOUS BLD VENIPUNCTURE: CPT

## 2023-10-03 PROCEDURE — 84153 ASSAY OF PSA TOTAL: CPT

## 2023-10-03 PROCEDURE — 74176 CT ABD & PELVIS W/O CONTRAST: CPT

## 2023-10-03 PROCEDURE — 80048 BASIC METABOLIC PNL TOTAL CA: CPT

## 2023-10-03 PROCEDURE — G1004 CDSM NDSC: HCPCS

## 2023-10-04 DIAGNOSIS — E08.22 DIABETES DUE TO UNDRL COND W DIABETIC CHRONIC KIDNEY DISEASE (HCC): ICD-10-CM

## 2023-10-04 NOTE — TELEPHONE ENCOUNTER
Name of medication/s patient is requesting to be refilled    Test Strips    Medication dosage    How often is medication being taken twice daily    Is patient requesting 30 or 90 day supply, 90    Name of Francesco Stevo Parker       Last Visit 9/25/2023  Next Visit 12/12/2023

## 2023-10-05 RX ORDER — BLOOD SUGAR DIAGNOSTIC
STRIP MISCELLANEOUS
Qty: 300 EACH | Refills: 3 | Status: SHIPPED | OUTPATIENT
Start: 2023-10-05

## 2023-10-29 DIAGNOSIS — N18.4 TYPE 2 DIABETES MELLITUS WITH STAGE 4 CHRONIC KIDNEY DISEASE, WITH LONG-TERM CURRENT USE OF INSULIN (HCC): ICD-10-CM

## 2023-10-29 DIAGNOSIS — Z79.4 TYPE 2 DIABETES MELLITUS WITH STAGE 4 CHRONIC KIDNEY DISEASE, WITH LONG-TERM CURRENT USE OF INSULIN (HCC): ICD-10-CM

## 2023-10-29 DIAGNOSIS — E11.22 TYPE 2 DIABETES MELLITUS WITH STAGE 4 CHRONIC KIDNEY DISEASE, WITH LONG-TERM CURRENT USE OF INSULIN (HCC): ICD-10-CM

## 2023-10-29 RX ORDER — NATEGLINIDE 120 MG/1
TABLET ORAL
Qty: 270 TABLET | Refills: 1 | Status: SHIPPED | OUTPATIENT
Start: 2023-10-29

## 2023-10-30 ENCOUNTER — TELEPHONE (OUTPATIENT)
Dept: ENDOCRINOLOGY | Facility: CLINIC | Age: 68
End: 2023-10-30

## 2023-10-30 DIAGNOSIS — N18.4 TYPE 2 DIABETES MELLITUS WITH STAGE 4 CHRONIC KIDNEY DISEASE, WITH LONG-TERM CURRENT USE OF INSULIN (HCC): ICD-10-CM

## 2023-10-30 DIAGNOSIS — Z79.4 TYPE 2 DIABETES MELLITUS WITH STAGE 4 CHRONIC KIDNEY DISEASE, WITH LONG-TERM CURRENT USE OF INSULIN (HCC): ICD-10-CM

## 2023-10-30 DIAGNOSIS — E11.22 TYPE 2 DIABETES MELLITUS WITH STAGE 4 CHRONIC KIDNEY DISEASE, WITH LONG-TERM CURRENT USE OF INSULIN (HCC): ICD-10-CM

## 2023-10-30 NOTE — TELEPHONE ENCOUNTER
Patient is calling regarding the ozempic,he's been not able to get this for almost a month. He doesn't really want to do anything else because of how great this is working for him.

## 2023-11-01 ENCOUNTER — OFFICE VISIT (OUTPATIENT)
Dept: ENDOCRINOLOGY | Facility: CLINIC | Age: 68
End: 2023-11-01
Payer: MEDICARE

## 2023-11-01 VITALS
BODY MASS INDEX: 30.06 KG/M2 | DIASTOLIC BLOOD PRESSURE: 80 MMHG | OXYGEN SATURATION: 98 % | HEART RATE: 68 BPM | RESPIRATION RATE: 18 BRPM | SYSTOLIC BLOOD PRESSURE: 130 MMHG | WEIGHT: 210 LBS | HEIGHT: 70 IN

## 2023-11-01 DIAGNOSIS — Z79.4 TYPE 2 DIABETES MELLITUS WITH DIABETIC NEPHROPATHY, WITH LONG-TERM CURRENT USE OF INSULIN (HCC): Primary | ICD-10-CM

## 2023-11-01 DIAGNOSIS — E11.21 TYPE 2 DIABETES MELLITUS WITH DIABETIC NEPHROPATHY, WITH LONG-TERM CURRENT USE OF INSULIN (HCC): Primary | ICD-10-CM

## 2023-11-01 DIAGNOSIS — E08.22 DIABETES DUE TO UNDRL COND W DIABETIC CHRONIC KIDNEY DISEASE (HCC): ICD-10-CM

## 2023-11-01 LAB — SL AMB POCT HEMOGLOBIN AIC: 6.8 (ref ?–6.5)

## 2023-11-01 PROCEDURE — 99214 OFFICE O/P EST MOD 30 MIN: CPT | Performed by: NURSE PRACTITIONER

## 2023-11-01 PROCEDURE — 83036 HEMOGLOBIN GLYCOSYLATED A1C: CPT | Performed by: NURSE PRACTITIONER

## 2023-11-01 RX ORDER — AMOXICILLIN AND CLAVULANATE POTASSIUM 875; 125 MG/1; MG/1
TABLET, FILM COATED ORAL
COMMUNITY
Start: 2023-09-20

## 2023-11-01 RX ORDER — BLOOD-GLUCOSE METER
EACH MISCELLANEOUS
Qty: 100 EACH | Refills: 3 | Status: SHIPPED | OUTPATIENT
Start: 2023-11-01

## 2023-11-01 RX ORDER — TRIAMCINOLONE ACETONIDE 1 MG/G
CREAM TOPICAL
COMMUNITY
Start: 2023-10-02

## 2023-11-01 NOTE — ASSESSMENT & PLAN NOTE
Patient is well controlled. Continue current regimen. Continue with healthy diet and regular physical activity. Patient will establish care with Endocrinology once he is settled in [place].    Lab Results   Component Value Date    HGBA1C 6.8 (A) 11/01/2023

## 2023-11-01 NOTE — PROGRESS NOTES
Established Patient Progress Note      Chief Complaint   Patient presents with    Diabetes Type 2     Patient has been without Ozempic for approx 3-4 weeks due to shortage, picked up today will start back at the 1mg, 36 clicks         Impression & Plan:    Problem List Items Addressed This Visit          Endocrine    Type 2 diabetes mellitus with kidney complication, with long-term current use of insulin ([address]) - Primary     Patient is well controlled. Continue current regimen. Continue with healthy diet and regular physical activity. Patient will establish care with Endocrinology once he is settled in [place]. Lab Results   Component Value Date    HGBA1C 6.8 (A) 11/01/2023          Relevant Medications    glucose blood (OneTouch Verio) test strip    Other Relevant Orders    POCT hemoglobin A1c (Completed)     Other Visit Diagnoses       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 (HCC)        Relevant Medications    glucose blood (OneTouch Verio) test strip            Orders Placed This Encounter   Procedures    POCT hemoglobin A1c       History of Present Illness:   Branden Soria is a 77 y.o. male with hypertension, hyperlipidemia, and type 2 diabetes presenting to the office today for follow-up. Complications of diabetes include neuropathy and nephropathy. At patient's last appointment on 6/6/2023, he was well controlled and no changes were made to his diabetes regimen. Patient called the office on 10/30/2023 reporting he was unable to receive his Ozempic due to the national shortage. However, he received his medication yesterday. He reports that he is feeling "great". He is moving to [place] to be closer to his family.       eGFRcr >59 31 Low      POC HgA1c 6.8%    Home blood glucose readings:   Before breakfast: 115, 124, 117, 142, 128, 118, 121, 128, 135, 120, 11, 110, 120  Before dinner: 128, 160, 138, 160, 135, 132, 133, 129, 118, 119, 128, 140, 132, 160, 142     Current regimen:   Ozempic 2 mg once weekly  Starlix 120 mg 3 times daily prior to meals    Last Eye Exam: UTD  Last Foot Exam: UTD    For hyperlipidemia, he is taking 80 mg of atorvastatin nightly. He denies myalgias. For hypertension, he is taking 10 mg of amlodipine daily, 25 mg of hydrochlorothiazide daily, 30 mg of isosorbide mononitrate twice daily, and 50 mg of metoprolol tartrate twice daily. He denies headache, pedal edema, and orthostatic hypotension.     Patient Active Problem List   Diagnosis    Acquired absence of kidney    Pure hypercholesterolemia    Type 2 diabetes mellitus with kidney complication, with long-term current use of insulin ([facility])    Hypertension    SOB (shortness of breath)    Hearing loss due to cerumen impaction, bilateral    CKD (chronic kidney disease), stage IV ([facility])    Hypertensive chronic kidney disease with stage 1 through stage 4 chronic kidney disease, or unspecified chronic kidney disease    Coronary artery disease involving native coronary artery without angina pectoris    Degeneration of lumbosacral intervertebral disc    Lumbar radiculopathy    Nephrolithiasis    Diabetes mellitus ([facility])    Chronic kidney disease-mineral and bone disorder    Class 2 severe obesity due to excess calories with serious comorbidity and body mass index (BMI) of 35.0 to 35.9 in adult     Vitamin D deficiency    Elevated uric acid in blood    Chronic knee pain    Neck pain    Persistent proteinuria    Diabetic nephropathy associated with type 2 diabetes mellitus ([address])      Past Medical History:   Diagnosis Date    Angina pectoris, unspecified ([address])     Cancer of kidney ([address]) 2019    Chronic kidney disease, stage 2 (mild)     Chronic kidney disease, stage 3 ([address])     Diabetes mellitus ([address])     Hypercholesterolemia     Hypertension     Nephrolithiasis     calcium oxalate - had lithotripsy     Seasonal allergies     Stage 3 chronic kidney disease ([address]) 11/14/2019      Past Surgical History:   Procedure Laterality Date    ARTHROSCOPY KNEE Bilateral     CARDIAC SURGERY      balloon procedure and stent placement     FOOT SURGERY Right     implants    LAMINECTOMY      NEPHRECTOMY Left 06/2019    PARTIAL HIP ARTHROPLASTY Right     ROTATOR CUFF REPAIR      SHOULDER ARTHROSCOPY Right     SHOULDER SURGERY Left     removal of bone spur      Family History   Problem Relation Age of Onset    Diabetes Mother     Diabetes type II Mother     Heart attack Father     Heart disease Father      Social History     Tobacco Use    Smoking status: Never    Smokeless tobacco: Never   Substance Use Topics    Alcohol use: No     Allergies   Allergen Reactions    Tetracyclines & Related Rash    Oxycodone-Acetaminophen Vomiting    Tetracycline     Jardiance [Empagliflozin] Rash         Current Outpatient Medications:     amLODIPine (NORVASC) 10 mg tablet, , Disp: , Rfl:     ammonium lactate (LAC-HYDRIN) 12 % cream, , Disp: , Rfl:     aspirin 81 mg chewable tablet, Chew 81 mg, Disp: , Rfl:     atorvastatin (LIPITOR) 80 mg tablet, Take by mouth, Disp: , Rfl:     cholecalciferol (VITAMIN D3) 1,000 units tablet, Take 1 tablet (1,000 Units total) by mouth daily, Disp: 90 tablet, Rfl: 1    fluticasone (FLONASE) 50 mcg/act nasal spray, 2 sprays into each nostril daily, Disp: 16 g, Rfl: 3    glucose blood (OneTouch Verio) test strip, Use to test blood sugar once daily. , Disp: 100 each, Rfl: 3    hydrochlorothiazide (HYDRODIURIL) 25 mg tablet, , Disp: , Rfl:     hydrocortisone 0.5 % cream, APPLY CREAM TOPICALLY TO AFFECTED AREA ONCE DAILY, Disp: , Rfl:     hydrocortisone 2.5 % cream, as needed, Disp: , Rfl:     Insulin Pen Needle (Pen Needles) 32G X 4 MM MISC, Use to inject insulin once daily. , Disp: 100 each, Rfl: 2    isosorbide mononitrate (IMDUR) 30 mg 24 hr tablet, Take 30 mg by mouth 2 (two) times a day, Disp: , Rfl:     Magnesium Oxide 500 MG TABS, Take as directed, Disp: , Rfl:     metoprolol tartrate (LOPRESSOR) 50 mg tablet, Take 50 mg by mouth every 12 (twelve) hours, Disp: , Rfl:     Multiple Vitamin (MULTIVITAMIN ADULT PO), Take by mouth, Disp: , Rfl:     nateglinide (STARLIX) 120 mg tablet, take 1 tablet by mouth three times a day before meals, Disp: 270 tablet, Rfl: 1    OneTouch Delica Lancets 83M MISC, Use to test blood sugar three times daily. , Disp: 200 each, Rfl: 3    rivaroxaban (XARELTO) 2.5 mg tablet, Take 2.5 mg by mouth 2 (two) times a day , Disp: , Rfl:     semaglutide, 2 mg/dose, (Ozempic, 2 MG/DOSE,) 8 mg/ mL injection pen, Inject 0.75 mL (2 mg total) under the skin every 7 days, Disp: 9 mL, Rfl: 1    sertraline (ZOLOFT) 50 mg tablet, Take 1 tablet by mouth once daily, Disp: 90 tablet, Rfl: 1    traZODone (DESYREL) 100 mg tablet, , Disp: , Rfl:     triamcinolone (KENALOG) 0.1 % cream, APPLY CREAM EXTERNALLY TO AFFECTED AREA TWICE DAILY FOR 2 WEEKS THEN AS NEEDED FOR FLARES, Disp: , Rfl:     amoxicillin-clavulanate (AUGMENTIN) 875-125 mg per tablet, TAKE 1 TABLET BY MOUTH TWICE DAILY UNTIL GONE (Patient not taking: Reported on 11/1/2023), Disp: , Rfl:     Review of Systems   Constitutional:  Negative for activity change, appetite change, fatigue and unexpected weight change. HENT:  Negative for dental problem, sore throat, trouble swallowing and voice change. Eyes:  Negative for visual disturbance. Respiratory:  Negative for cough, chest tightness and shortness of breath. Cardiovascular:  Negative for chest pain, palpitations and leg swelling. Gastrointestinal:  Negative for constipation, diarrhea, nausea and vomiting. Endocrine: Negative for polydipsia, polyphagia and polyuria. Genitourinary:  Negative for frequency. Musculoskeletal:  Positive for arthralgias. Negative for back pain, gait problem and myalgias. Skin:  Negative for wound. Allergic/Immunologic: Negative for environmental allergies and food allergies. Neurological:  Positive for numbness.  Negative for dizziness, weakness, light-headedness and headaches. Psychiatric/Behavioral:  Negative for decreased concentration, dysphoric mood and sleep disturbance. The patient is not nervous/anxious. Physical Exam:  Body mass index is 30.13 kg/m². /80   Pulse 68   Resp 18   Ht 5' 10" (1.778 m)   Wt 95.3 kg (210 lb)   SpO2 98%   BMI 30.13 kg/m²    Wt Readings from Last 3 Encounters:   11/01/23 95.3 kg (210 lb)   08/30/23 96.2 kg (212 lb)   06/06/23 102 kg (224 lb)       Physical Exam  Vitals reviewed. Constitutional:       General: He is not in acute distress. Appearance: He is well-developed. He is not ill-appearing. HENT:      Head: Normocephalic and atraumatic. Eyes:      Pupils: Pupils are equal, round, and reactive to light. Neck:      Thyroid: No thyromegaly. Cardiovascular:      Rate and Rhythm: Normal rate and regular rhythm. Pulses: Normal pulses. Heart sounds: Normal heart sounds. Pulmonary:      Effort: Pulmonary effort is normal.      Breath sounds: Normal breath sounds. Abdominal:      General: Bowel sounds are normal. There is no distension. Palpations: Abdomen is soft. Tenderness: There is no abdominal tenderness. Musculoskeletal:      Cervical back: Normal range of motion and neck supple. Right lower leg: No edema. Left lower leg: No edema. Lymphadenopathy:      Cervical: No cervical adenopathy. Skin:     General: Skin is warm and dry. Capillary Refill: Capillary refill takes less than 2 seconds. Neurological:      Mental Status: He is alert and oriented to person, place, and time.       Gait: Gait normal.   Psychiatric:         Mood and Affect: Mood normal.         Behavior: Behavior normal.           Labs:   Lab Results   Component Value Date    HGBA1C 6.8 (A) 11/01/2023    HGBA1C 6.9 (H) 05/26/2023    HGBA1C 6.9 05/26/2023     Lab Results   Component Value Date    CREATININE 2.06 (H) 10/03/2023    CREATININE 2.59 (H) 06/30/2023    CREATININE 2.59 (H) 09/26/2022    BUN 32 (H) 10/03/2023     06/19/2018    K 3.8 10/03/2023     10/03/2023    CO2 28 10/03/2023     eGFR   Date Value Ref Range Status   10/03/2023 31 ml/min/1.73sq m Final     Lab Results   Component Value Date    CHOL 140 05/15/2018    HDL 34 (L) 10/24/2022    TRIG 126 10/24/2022     Lab Results   Component Value Date     (H) 06/30/2023    AST 69 (H) 06/30/2023    ALKPHOS 64 06/30/2023    BILITOT 0.7 04/11/2018     Lab Results   Component Value Date    [id number] 2.740 05/06/2019     No results found for: "Zuleyka Suero", "ROSCOEI"          Discussed with the patient and all questioned fully answered. He will call me if any problems arise. Patient will establish care with local provider once he has moved. Counseled patient on diagnostic results, prognosis, risk and benefit of treatment options, instruction for management, importance of treatment compliance, Risk  factor reduction and impressions    There are no Patient Instructions on file for this visit.     [address],[place]. [address]

## 2023-11-04 ENCOUNTER — VBI (OUTPATIENT)
Dept: ADMINISTRATIVE | Facility: OTHER | Age: 68
End: 2023-11-04

## 2023-11-27 ENCOUNTER — VBI (OUTPATIENT)
Dept: ADMINISTRATIVE | Facility: OTHER | Age: 68
End: 2023-11-27

## 2024-02-21 PROBLEM — H61.23 HEARING LOSS DUE TO CERUMEN IMPACTION, BILATERAL: Status: RESOLVED | Noted: 2020-02-19 | Resolved: 2024-02-21

## 2024-04-29 ENCOUNTER — TELEPHONE (OUTPATIENT)
Dept: SURGERY | Facility: CLINIC | Age: 69
End: 2024-04-29

## 2024-04-29 NOTE — TELEPHONE ENCOUNTER
Form received requesting a refill of patient's test strips to be sent in, see scanned under media.

## 2024-05-02 ENCOUNTER — TELEPHONE (OUTPATIENT)
Dept: ENDOCRINOLOGY | Facility: CLINIC | Age: 69
End: 2024-05-02

## 2024-05-02 DIAGNOSIS — E08.22 DIABETES DUE TO UNDRL COND W DIABETIC CHRONIC KIDNEY DISEASE (HCC): ICD-10-CM

## 2024-05-03 RX ORDER — BLOOD SUGAR DIAGNOSTIC
STRIP MISCELLANEOUS
Qty: 100 EACH | Refills: 3 | Status: SHIPPED | OUTPATIENT
Start: 2024-05-03

## 2024-05-09 ENCOUNTER — TELEPHONE (OUTPATIENT)
Dept: NEPHROLOGY | Facility: CLINIC | Age: 69
End: 2024-05-09